# Patient Record
Sex: MALE | Race: WHITE | Employment: OTHER | URBAN - METROPOLITAN AREA
[De-identification: names, ages, dates, MRNs, and addresses within clinical notes are randomized per-mention and may not be internally consistent; named-entity substitution may affect disease eponyms.]

---

## 2018-08-08 ENCOUNTER — APPOINTMENT (OUTPATIENT)
Dept: GENERAL RADIOLOGY | Age: 63
DRG: 176 | End: 2018-08-08
Attending: EMERGENCY MEDICINE
Payer: SELF-PAY

## 2018-08-08 ENCOUNTER — APPOINTMENT (OUTPATIENT)
Dept: CT IMAGING | Age: 63
DRG: 176 | End: 2018-08-08
Attending: EMERGENCY MEDICINE
Payer: SELF-PAY

## 2018-08-08 ENCOUNTER — HOSPITAL ENCOUNTER (INPATIENT)
Age: 63
LOS: 1 days | Discharge: HOME | DRG: 176 | End: 2018-08-09
Attending: EMERGENCY MEDICINE | Admitting: INTERNAL MEDICINE
Payer: SELF-PAY

## 2018-08-08 DIAGNOSIS — I26.99 BILATERAL PULMONARY EMBOLISM (HCC): Primary | ICD-10-CM

## 2018-08-08 PROBLEM — R07.81 CHEST PAIN, PLEURITIC: Status: ACTIVE | Noted: 2018-08-08

## 2018-08-08 LAB
ALBUMIN SERPL-MCNC: 3.2 G/DL (ref 3.5–5)
ALBUMIN/GLOB SERPL: 0.8 {RATIO} (ref 1.1–2.2)
ALP SERPL-CCNC: 99 U/L (ref 45–117)
ALT SERPL-CCNC: 24 U/L (ref 12–78)
ANION GAP SERPL CALC-SCNC: 8 MMOL/L (ref 5–15)
AST SERPL-CCNC: 18 U/L (ref 15–37)
ATRIAL RATE: 81 BPM
BASOPHILS # BLD: 0 K/UL (ref 0–0.1)
BASOPHILS NFR BLD: 0 % (ref 0–1)
BILIRUB SERPL-MCNC: 1.2 MG/DL (ref 0.2–1)
BNP SERPL-MCNC: 146 PG/ML (ref 0–125)
BUN SERPL-MCNC: 9 MG/DL (ref 6–20)
BUN/CREAT SERPL: 11 (ref 12–20)
CALCIUM SERPL-MCNC: 8.7 MG/DL (ref 8.5–10.1)
CALCULATED P AXIS, ECG09: 3 DEGREES
CALCULATED R AXIS, ECG10: -59 DEGREES
CALCULATED T AXIS, ECG11: 2 DEGREES
CHLORIDE SERPL-SCNC: 99 MMOL/L (ref 97–108)
CO2 SERPL-SCNC: 28 MMOL/L (ref 21–32)
CREAT SERPL-MCNC: 0.8 MG/DL (ref 0.7–1.3)
DIAGNOSIS, 93000: NORMAL
DIFFERENTIAL METHOD BLD: ABNORMAL
EOSINOPHIL # BLD: 0.1 K/UL (ref 0–0.4)
EOSINOPHIL NFR BLD: 1 % (ref 0–7)
ERYTHROCYTE [DISTWIDTH] IN BLOOD BY AUTOMATED COUNT: 15.4 % (ref 11.5–14.5)
FERRITIN SERPL-MCNC: 443 NG/ML (ref 26–388)
GLOBULIN SER CALC-MCNC: 3.9 G/DL (ref 2–4)
GLUCOSE SERPL-MCNC: 123 MG/DL (ref 65–100)
HCT VFR BLD AUTO: 36.9 % (ref 36.6–50.3)
HGB BLD-MCNC: 12.6 G/DL (ref 12.1–17)
IMM GRANULOCYTES # BLD: 0.1 K/UL (ref 0–0.04)
IMM GRANULOCYTES NFR BLD AUTO: 1 % (ref 0–0.5)
IRON SATN MFR SERPL: 18 % (ref 20–50)
IRON SERPL-MCNC: 78 UG/DL (ref 35–150)
LYMPHOCYTES # BLD: 1.6 K/UL (ref 0.8–3.5)
LYMPHOCYTES NFR BLD: 14 % (ref 12–49)
MCH RBC QN AUTO: 36.4 PG (ref 26–34)
MCHC RBC AUTO-ENTMCNC: 34.1 G/DL (ref 30–36.5)
MCV RBC AUTO: 106.6 FL (ref 80–99)
MONOCYTES # BLD: 0.8 K/UL (ref 0–1)
MONOCYTES NFR BLD: 8 % (ref 5–13)
NEUTS SEG # BLD: 8.4 K/UL (ref 1.8–8)
NEUTS SEG NFR BLD: 76 % (ref 32–75)
NRBC # BLD: 0 K/UL (ref 0–0.01)
NRBC BLD-RTO: 0 PER 100 WBC
P-R INTERVAL, ECG05: 126 MS
PLATELET # BLD AUTO: 197 K/UL (ref 150–400)
PMV BLD AUTO: 9.1 FL (ref 8.9–12.9)
POTASSIUM SERPL-SCNC: 3.4 MMOL/L (ref 3.5–5.1)
PROT SERPL-MCNC: 7.1 G/DL (ref 6.4–8.2)
Q-T INTERVAL, ECG07: 444 MS
QRS DURATION, ECG06: 152 MS
QTC CALCULATION (BEZET), ECG08: 515 MS
RBC # BLD AUTO: 3.46 M/UL (ref 4.1–5.7)
SODIUM SERPL-SCNC: 135 MMOL/L (ref 136–145)
TIBC SERPL-MCNC: 441 UG/DL (ref 250–450)
TROPONIN I SERPL-MCNC: <0.05 NG/ML
VENTRICULAR RATE, ECG03: 81 BPM
WBC # BLD AUTO: 11 K/UL (ref 4.1–11.1)

## 2018-08-08 PROCEDURE — 96372 THER/PROPH/DIAG INJ SC/IM: CPT

## 2018-08-08 PROCEDURE — 74011250637 HC RX REV CODE- 250/637: Performed by: EMERGENCY MEDICINE

## 2018-08-08 PROCEDURE — 83880 ASSAY OF NATRIURETIC PEPTIDE: CPT | Performed by: EMERGENCY MEDICINE

## 2018-08-08 PROCEDURE — 82728 ASSAY OF FERRITIN: CPT | Performed by: INTERNAL MEDICINE

## 2018-08-08 PROCEDURE — 71045 X-RAY EXAM CHEST 1 VIEW: CPT

## 2018-08-08 PROCEDURE — 99285 EMERGENCY DEPT VISIT HI MDM: CPT

## 2018-08-08 PROCEDURE — 80053 COMPREHEN METABOLIC PANEL: CPT | Performed by: EMERGENCY MEDICINE

## 2018-08-08 PROCEDURE — 74011636320 HC RX REV CODE- 636/320: Performed by: RADIOLOGY

## 2018-08-08 PROCEDURE — 74011250636 HC RX REV CODE- 250/636: Performed by: INTERNAL MEDICINE

## 2018-08-08 PROCEDURE — 85025 COMPLETE CBC W/AUTO DIFF WBC: CPT | Performed by: EMERGENCY MEDICINE

## 2018-08-08 PROCEDURE — 84484 ASSAY OF TROPONIN QUANT: CPT | Performed by: EMERGENCY MEDICINE

## 2018-08-08 PROCEDURE — 74011250637 HC RX REV CODE- 250/637: Performed by: NURSE PRACTITIONER

## 2018-08-08 PROCEDURE — 71275 CT ANGIOGRAPHY CHEST: CPT

## 2018-08-08 PROCEDURE — 83540 ASSAY OF IRON: CPT | Performed by: INTERNAL MEDICINE

## 2018-08-08 PROCEDURE — 74011250636 HC RX REV CODE- 250/636: Performed by: EMERGENCY MEDICINE

## 2018-08-08 PROCEDURE — 65270000029 HC RM PRIVATE

## 2018-08-08 PROCEDURE — 36415 COLL VENOUS BLD VENIPUNCTURE: CPT | Performed by: EMERGENCY MEDICINE

## 2018-08-08 PROCEDURE — 93970 EXTREMITY STUDY: CPT

## 2018-08-08 PROCEDURE — 93306 TTE W/DOPPLER COMPLETE: CPT

## 2018-08-08 PROCEDURE — 65660000000 HC RM CCU STEPDOWN

## 2018-08-08 PROCEDURE — 93005 ELECTROCARDIOGRAM TRACING: CPT

## 2018-08-08 RX ORDER — IBUPROFEN 200 MG
200 TABLET ORAL
COMMUNITY
End: 2018-08-08

## 2018-08-08 RX ORDER — ACETAMINOPHEN 325 MG/1
650 TABLET ORAL
Status: DISCONTINUED | OUTPATIENT
Start: 2018-08-08 | End: 2018-08-09 | Stop reason: HOSPADM

## 2018-08-08 RX ORDER — SODIUM CHLORIDE 0.9 % (FLUSH) 0.9 %
5-10 SYRINGE (ML) INJECTION EVERY 8 HOURS
Status: DISCONTINUED | OUTPATIENT
Start: 2018-08-08 | End: 2018-08-09 | Stop reason: HOSPADM

## 2018-08-08 RX ORDER — DIPHENHYDRAMINE HCL 25 MG
25 CAPSULE ORAL
Status: DISCONTINUED | OUTPATIENT
Start: 2018-08-08 | End: 2018-08-09 | Stop reason: HOSPADM

## 2018-08-08 RX ORDER — HYDROCODONE BITARTRATE AND ACETAMINOPHEN 5; 325 MG/1; MG/1
1 TABLET ORAL
Status: COMPLETED | OUTPATIENT
Start: 2018-08-08 | End: 2018-08-08

## 2018-08-08 RX ORDER — ENOXAPARIN SODIUM 100 MG/ML
1 INJECTION SUBCUTANEOUS
Status: COMPLETED | OUTPATIENT
Start: 2018-08-08 | End: 2018-08-08

## 2018-08-08 RX ORDER — ENOXAPARIN SODIUM 100 MG/ML
1 INJECTION SUBCUTANEOUS EVERY 12 HOURS
Status: DISCONTINUED | OUTPATIENT
Start: 2018-08-09 | End: 2018-08-09 | Stop reason: HOSPADM

## 2018-08-08 RX ORDER — ONDANSETRON 2 MG/ML
4 INJECTION INTRAMUSCULAR; INTRAVENOUS
Status: DISCONTINUED | OUTPATIENT
Start: 2018-08-08 | End: 2018-08-09 | Stop reason: HOSPADM

## 2018-08-08 RX ORDER — HYDROCODONE BITARTRATE AND ACETAMINOPHEN 5; 325 MG/1; MG/1
1 TABLET ORAL
Status: DISCONTINUED | OUTPATIENT
Start: 2018-08-08 | End: 2018-08-09 | Stop reason: HOSPADM

## 2018-08-08 RX ORDER — NALOXONE HYDROCHLORIDE 0.4 MG/ML
0.4 INJECTION, SOLUTION INTRAMUSCULAR; INTRAVENOUS; SUBCUTANEOUS AS NEEDED
Status: DISCONTINUED | OUTPATIENT
Start: 2018-08-08 | End: 2018-08-09 | Stop reason: HOSPADM

## 2018-08-08 RX ORDER — POTASSIUM CHLORIDE 1.5 G/1.77G
40 POWDER, FOR SOLUTION ORAL ONCE
Status: COMPLETED | OUTPATIENT
Start: 2018-08-08 | End: 2018-08-08

## 2018-08-08 RX ORDER — NICOTINE 7MG/24HR
1 PATCH, TRANSDERMAL 24 HOURS TRANSDERMAL DAILY
Status: DISCONTINUED | OUTPATIENT
Start: 2018-08-09 | End: 2018-08-09 | Stop reason: HOSPADM

## 2018-08-08 RX ORDER — SODIUM CHLORIDE 0.9 % (FLUSH) 0.9 %
5-10 SYRINGE (ML) INJECTION AS NEEDED
Status: DISCONTINUED | OUTPATIENT
Start: 2018-08-08 | End: 2018-08-09 | Stop reason: HOSPADM

## 2018-08-08 RX ADMIN — Medication 10 ML: at 17:07

## 2018-08-08 RX ADMIN — ENOXAPARIN SODIUM 100 MG: 100 INJECTION SUBCUTANEOUS at 23:07

## 2018-08-08 RX ADMIN — IOPAMIDOL 100 ML: 755 INJECTION, SOLUTION INTRAVENOUS at 12:18

## 2018-08-08 RX ADMIN — ENOXAPARIN SODIUM 100 MG: 40 INJECTION, SOLUTION INTRAVENOUS; SUBCUTANEOUS at 12:34

## 2018-08-08 RX ADMIN — POTASSIUM CHLORIDE 40 MEQ: 1.5 POWDER, FOR SOLUTION ORAL at 14:28

## 2018-08-08 RX ADMIN — Medication 10 ML: at 21:45

## 2018-08-08 RX ADMIN — HYDROCODONE BITARTRATE AND ACETAMINOPHEN 1 TABLET: 5; 325 TABLET ORAL at 10:59

## 2018-08-08 NOTE — ED NOTES
Blood specimens collected from existing IV and sent to lab. Instructed to collect hemoccult specimen from next BM per Dr. Krys Pringle.

## 2018-08-08 NOTE — ED NOTES
TRANSFER - OUT REPORT:    Verbal report given to JAMES Winston on [facility]  being transferred to room [phone] for routine progression of care       Report consisted of patients Situation, Background, Assessment and   Recommendations(SBAR). Information from the following report(s) SBAR, Kardex, ED Summary, Procedure Summary, Intake/Output, MAR, Recent Results, Med Rec Status and Cardiac Rhythm NSR was reviewed with the receiving nurse. Lines:   Peripheral IV 08/08/18 Left Hand (Active)   Site Assessment Clean, dry, & intact 8/8/2018 10:00 AM   Phlebitis Assessment 0 8/8/2018 10:00 AM   Infiltration Assessment 0 8/8/2018 10:00 AM   Dressing Status Clean, dry, & intact 8/8/2018 10:00 AM   Dressing Type Transparent 8/8/2018 10:00 AM   Hub Color/Line Status Pink 8/8/2018 10:00 AM        Opportunity for questions and clarification was provided.       Patient transported with:   Monitor  Registered Nurse

## 2018-08-08 NOTE — PROGRESS NOTES
Advance Care Planning Note    Name: Anabela Harris  YOB: 1955  MRN: [id number]  Admission Date: 8/8/2018  9:37 AM    Date of discussion: 8/8/2018    Active Diagnoses:    Hospital Problems  Date Reviewed: 8/8/2018          Codes Class Noted POA   * (Principal)Pulmonary emboli (HCC)   Tobacco abuse   Alcohol use   Chest pain, pleuritic   Idiopathic gout of multiple sites           These active diagnoses are of sufficient risk that focused discussion on advance care planning is indicated in order to allow the patient to thoughtfully consider personal goals of care, and if situations arise that prevent the ability to personally give input, to ensure appropriate representation of their personal desires for different levels and aggressiveness of care. Discussion:     Persons present and participating in discussion: Marvin King MD, Patient's wife    Discussion: Advised of diagnosis, prognosis, treatment and risks of pulmonary embolism. Wife is surrogate decision maker. Full code status. Time Spent:     Total time spent face-to-face in education and discussion: 20 minutes.      Marvin Amin MD  8/8/2018  1:18 PM

## 2018-08-08 NOTE — ROUTINE PROCESS
Primary Nurse Cookie Lu and Zaria Shelton, RN performed a dual skin assessment on this patient No impairment noted  Capo score is 21

## 2018-08-08 NOTE — CONSULTS
Name: [facility]: [address]   : 1955 Admit Date: 2018   Phone: [phone]  Room: [facility]/   PCP: Christy Arce MD  MRN: [id number]   Date: 2018  Code: Prior        HPI:      Chart and notes reviewed. Data reviewed. I review the patient's current medications in the medical record at each encounter.  I have evaluated and examined the patient. 1:31 PM       History was obtained from patient. I was asked by Lian Gonzalez MD to see Noe Ryder in consultation for a chief complaint of bilateral PE, abnormal chest CT. History of Present Illness:   is a very pleasant, 66year old gentleman that presented to [facility] today with worsening chest pain and shortness of breath. He reports that he started to have symptoms yesterday. He has a hard time breathing unless he is sitting up. He also coughed up small amount of blood last night, this has not occurred since that time. Denies fever/chills. Denies LE pain or swelling, although notes that he will occasionally swell in his right leg due to gout. He denies recent travel: he drives daily about 20 minutes but otherwise has not had any trips or airplane travel. Denies immobility or recent surgeries. He works as a  and is constantly active. Denies family history of blood clots. Denies history of cancer. Reports that he never sees a doctor and has not had any screenings for his age. He smokes daily up to 1/2 ppd. Denies history of lung disease. Images:  Personally reviewed. Chest CTA:  Large cast-like emboli in both lower lobes, extending ot the left main pulmonary artery. Proagation is noted distally in both lower lobes. Associated triangular peripheral airspace disease and small pleural effusion on the left. No mediastinal or hilar adenopathy. No evidence of aortic dissection or aneurysm.     WBC 11  Hgb 12.6  Plts 197  K 3.4  Trop <.05  Pro-      Past Medical History:   Diagnosis Date    Alcohol use     Idiopathic gout of multiple sites 6/7/2016    Kidney stone 10/16/2015    Tobacco abuse        History reviewed. No pertinent surgical history. Family History   Problem Relation Age of Onset    Dementia Mother     Diabetes Mother     Heart Failure Father        Social History   Substance Use Topics    Smoking status: Current Every Day Smoker     Packs/day: 0.75     Years: 40.00     Types: Cigarettes    Smokeless tobacco: Never Used    Alcohol use 0.0 oz/week     0 Standard drinks or equivalent per week      Comment: 1 bottle of scotch weekly. Allergies   Allergen Reactions    Eggplant Anaphylaxis    Pcn [Penicillins] Anaphylaxis and Swelling    Codeine Rash, Nausea and Vomiting and Swelling       Current Facility-Administered Medications   Medication Dose Route Frequency    [START ON 8/9/2018] enoxaparin (LOVENOX) injection 100 mg  1 mg/kg SubCUTAneous Q12H    [START ON 8/9/2018] nicotine (NICODERM CQ) 7 mg/24 hr patch 1 Patch  1 Patch TransDERmal DAILY     No current outpatient prescriptions on file. REVIEW OF SYSTEMS   Negative except as stated in the HPI. Physical Exam:   Visit Vitals    BP (!) 153/92    Pulse 73    Temp 98.7 °F (37.1 °C)    Resp 21    Ht 5' 11\" (1.803 m)    Wt 99.8 kg (220 lb)    SpO2 95%    BMI 30.68 kg/m2       General:  Alert, cooperative, no distress, appears stated age. Head:  Normocephalic, without obvious abnormality, atraumatic. Eyes:  Conjunctivae/corneas clear. Nose: Nares normal. Septum midline. Mucosa normal.    Throat: Lips, mucosa, and tongue normal. Teeth and gums normal.   Neck: Supple, symmetrical, trachea midline, no adenopathy. Lungs:   Few crackles in left base, otherwise clear. Chest wall:  No tenderness or deformity. Heart:  Regular rate and rhythm, S1, S2 normal, no murmur, click, rub or gallop. Abdomen:   Soft, non-tender.  Bowel sounds normal.    Extremities: Extremities normal, atraumatic, no cyanosis or edema. Pulses: 2+ and symmetric all extremities. Skin: Skin color, texture, turgor normal. No rashes or lesions   Lymph nodes: Cervical nodes normal.   Neurologic: Grossly nonfocal       Lab Results   Component Value Date/Time    Sodium 135 (L) 08/08/2018 10:02 AM    Potassium 3.4 (L) 08/08/2018 10:02 AM    Chloride 99 08/08/2018 10:02 AM    CO2 28 08/08/2018 10:02 AM    BUN 9 08/08/2018 10:02 AM    Creatinine 0.80 08/08/2018 10:02 AM    Glucose 123 (H) 08/08/2018 10:02 AM    Calcium 8.7 08/08/2018 10:02 AM       Lab Results   Component Value Date/Time    WBC 11.0 08/08/2018 10:02 AM    HGB 12.6 08/08/2018 10:02 AM    PLATELET 963 43/60/3379 10:02 AM    .6 (H) 08/08/2018 10:02 AM       Lab Results   Component Value Date/Time    AST (SGOT) 18 08/08/2018 10:02 AM    Alk.  phosphatase 99 08/08/2018 10:02 AM    Protein, total 7.1 08/08/2018 10:02 AM    Albumin 3.2 (L) 08/08/2018 10:02 AM    Globulin 3.9 08/08/2018 10:02 AM       No results found for: IRON, FE, TIBC, IBCT, PSAT, FERR    Lab Results   Component Value Date/Time    C-Reactive protein 4.93 (H) 10/08/2015 10:00 PM        No results found for: PH, PHI, PCO2, PCO2I, PO2, PO2I, HCO3, HCO3I, FIO2, FIO2I    Lab Results   Component Value Date/Time    Troponin-I, Qt. <0.05 08/08/2018 10:02 AM        Lab Results   Component Value Date/Time    Culture result: NO GROWTH 5 DAYS 10/09/2015 12:41 AM       No results found for: TOXA1, RPR, HBCM, HBSAG, HAAB, HCAB1, HAAT, G6PD, CRYAC, HIVGT, HIVR, HIV1, HIV12, HIVPC, HIVRPI    No results found for: VANCT, CPK    Lab Results   Component Value Date/Time    Color YELLOW/STRAW 08/27/2015 05:27 PM    Appearance CLEAR 08/27/2015 05:27 PM    pH (UA) 7.0 08/27/2015 05:27 PM    Protein NEGATIVE  08/27/2015 05:27 PM    Glucose NEGATIVE  08/27/2015 05:27 PM    Ketone 40 (A) 08/27/2015 05:27 PM    Bilirubin NEGATIVE  08/27/2015 05:27 PM    Blood MODERATE (A) 08/27/2015 05:27 PM Urobilinogen 0.2 08/27/2015 05:27 PM    Nitrites NEGATIVE  08/27/2015 05:27 PM    Leukocyte Esterase NEGATIVE  08/27/2015 05:27 PM    WBC 0-4 08/27/2015 05:27 PM    RBC 0-5 08/27/2015 05:27 PM    Bacteria NEGATIVE  08/27/2015 05:27 PM       IMPRESSION  · Dyspnea  · Abnormal Chest CT: with multiple bilateral pulmonary emboli with likely left lower lobe infarcttion and small pleural effusion: PEs appear to be unprovoked  · Smoker  · History of Gout  · Hypokalemia    PLAN  · Supplemental O2 if needed to keep sats >90%. Currently maintaining well on RA. · Agree with Lovenox: will need to bridge to oral therapy for 3-6 months  · Check ECHO  · BLE Dopplers  · Consider checking hypercoagulable panel as this appears to be unprovoked  · Needs outpatient PCP follow-up for screening and referral for colonoscopy  · Replete K  · Advised that he needs to cut back on smoking: Nicotine patch already ordered    Thank you for allowing us to participate in 's care.       Rhonda South NP

## 2018-08-08 NOTE — PROCEDURES
[facility]  *** FINAL REPORT ***    Name: Bushra Bosch  MRN: [id number]    Inpatient  : 05 Aug 1955  HIS Order #: [id number]  [address] [facility] Visit #: [id number]  Date: 08 Aug 2018    TYPE OF TEST: Peripheral Venous Testing    REASON FOR TEST  Suspected pulmonary embolism    Right Leg:-  Deep venous thrombosis:           Yes  Proximal extent of thrombus:      Common Femoral  Superficial venous thrombosis:    Yes  Deep venous insufficiency:        No  Superficial venous insufficiency: No    Left Leg:-  Deep venous thrombosis:           No  Superficial venous thrombosis:    No  Deep venous insufficiency:        No  Superficial venous insufficiency: No      INTERPRETATION/FINDINGS  PROCEDURE:  BILATERAL LE VENOUS DUPLEX. Evaluation of lower extremity veins with ultrasound (B-mode imaging,  pulsed Doppler, color Doppler). Includes the common femoral, deep  femoral, femoral, popliteal, posterior tibial, peroneal, and great  saphenous veins. FINDINGS:  Curly Cower scale and color flow duplex images of the proximal  common femoral vein and saphenpfemoral junction in the right lower  extremity demonstrate no/partial compressibility, with filling  defects. Thrombus appears acute. CONCLUSION: Right lower extremity venous duplex positive for acute  deep venous thrombosis involving the proximal common femoral vein and  saphenofemoral junction. Left lower extremity is thrombus free. ADDITIONAL COMMENTS    I have personally reviewed the data relevant to the interpretation of  this  study. TECHNOLOGIST: Ines Lazar. Mateo  Signed: 2018 04:44 PM    PHYSICIAN: Karine Beckford.  Asa Guevara MD  Signed: 2018 11:18 AM

## 2018-08-08 NOTE — H&P
[facility]    Hospitalist Admission Note      NAME:  Silvia Vishnu   :   1955   MRN:  [id number]     PCP:  Lynsey Peralta MD     Date/Time:  2018 1:11 PM          Subjective:     CHIEF COMPLAINT: chest pain    HISTORY OF PRESENT ILLNESS:     Mr. Keturah Phillip is a 66 y.o.  male who presented to the Emergency Department complaining of chest pain. It started yesterday. Left sie, pleuritic, positional, radiates to neck, sharp, associated with dyspnea, and trace blood in thin sputum. New. ER workup shows bilateral PE on CTA chest.  He denies travel, immobility, trauma, personal or family hx of clots. We will admit him for management. Past Medical History:   Diagnosis Date    Alcohol use     Idiopathic gout of multiple sites 2016    Kidney stone 10/16/2015    Tobacco abuse         History reviewed. No pertinent surgical history. Social History   Substance Use Topics    Smoking status: Current Every Day Smoker     Packs/day: 0.75     Years: 40.00     Types: Cigarettes    Smokeless tobacco: Never Used    Alcohol use 0.0 oz/week     0 Standard drinks or equivalent per week      Comment: 1 bottle of scotch weekly.         Family History   Problem Relation Age of Onset    Dementia Mother     Diabetes Mother     Heart Failure Father       Allergies   Allergen Reactions    Pcn [Penicillins] Anaphylaxis    Codeine Swelling    Codeine Rash    Pcn [Penicillins] Swelling        Prior to Admission medications    Not on File       Review of Systems:  (bold if positive, if negative)    Gen:  fatigueEyes:  ENT:  CVS:   chest painPulm:  Cough, dyspnea, sputum, hemoptysis,GI:    :    MS:  Skin:  Psych:  Endo:    Hem:  Renal:    Neuro:        Objective:      VITALS:    Vital signs reviewed; most recent are:    Visit Vitals    BP (!) 153/92    Pulse 73    Temp 98.7 °F (37.1 °C)    Resp 21    Ht 5' 11\" (1.803 m)    Wt 99.8 kg (220 lb)    SpO2 95%    BMI 30.68 kg/m2 SpO2 Readings from Last 6 Encounters:   08/08/18 95%   10/10/15 98%   08/27/15 97%        No intake or output data in the 24 hours ending 08/08/18 1311     Exam:     Physical Exam:    Gen:  Well-developed, well-nourished, in no acute distress  HEENT:  Pink conjunctivae, PERRL, hearing intact to voice, moist mucous membranes  Neck:  Supple, without masses, thyroid non-tender  Resp:  No accessory muscle use, reduced breath sounds without wheezes rales or rhonchi  Card:  No murmurs, normal S1, S2 without thrills, bruits or peripheral edema  Abd:  Soft, non-tender, non-distended, normoactive bowel sounds are present, no mass  Lymph:  No cervical or inguinal adenopathy  Musc:  No cyanosis or clubbing  Skin:  No rashes or ulcers, skin turgor is good  Neuro:  Cranial nerves are grossly intact, no focal motor weakness, follows commands appropriately  Psych:  Good insight, oriented to person, place and time, alert     Labs:    Recent Labs      08/08/18   1002   WBC  11.0   HGB  12.6   HCT  36.9   PLT  197     Recent Labs      08/08/18   1002   NA  135*   K  3.4*   CL  99   CO2  28   GLU  123*   BUN  9   CREA  0.80   CA  8.7   ALB  3.2*   TBILI  1.2*   SGOT  18   ALT  24     No results found for: GLUCPOC  No results for input(s): PH, PCO2, PO2, HCO3, FIO2 in the last 72 hours. No results for input(s): INR in the last 72 hours. No lab exists for component: INREXT  All Micro Results     Procedure Component Value Units Date/Time    CULTURE, BLOOD, PAIRED [[id number]]     Order Status:  Canceled Specimen:  Blood           I have reviewed previous records       Assessment and Plan:      Pulmonary emboli / Chest pain, pleuritic - POA, new and unclear trigger. No clear trigger. I assume spontaneous. Thus would be on anticoagulation indefinitely. PCP to manage. Patient needs routine cancer screening by PCP, never has colonoscopy. During this admit, monitor, tele, check ECHO, check LE dopplers for further burden.  Check 6min walk tomorrow. Pulmonary consult to evaluate abnormal CTA. Most likely infarction, less likely pneumonia in the absence of any SIRS criteria. Idiopathic gout of multiple sites - Currently no symptoms. Takes prn motrin, and I advised to use tylenol while on blood thinners. Tobacco abuse - Advised cessation. Spent 5 minutes in addition to all other time spent on admission, noted below. Provide patch. Alcohol use - Unlikely any risk of withdrawal, but risk of gastritis. Advised cessation. Check hemoccult. Check hepatitis panel.     Telemetry reviewed:   normal sinus rhythm    Risk of deterioration: high      Total time spent with patient: [address] discussed with: Patient, Family, Nursing Staff, Consultant/Specialist and >50% of time spent in counseling and coordination of care    Discussed:  Care Plan       ___________________________________________________    Attending Physician: Skylar Denise MD

## 2018-08-08 NOTE — ROUTINE PROCESS
Bedside shift change report given to Maurisio (oncoming nurse) by Janet Matamoros (offgoing nurse). Report included the following information SBAR and ED Summary.

## 2018-08-08 NOTE — ED PROVIDER NOTES
HPI Comments: 66 y.o. male with past medical history significant for gout and kidney stones who presents via private vehicle from home accompanied by his wife with chief complaint of chest pain. Patient reports 1 day Hx (8/7/18) of left-sided chest pain exacerbated when supine, with inspiration, and sometimes radiating to his neck with coughing. Patient describes pain as \"a stitch in (his) side, like when you're running. \" Patient reports cough with blood-tinged streaks this morning (8/8/18). Patient denies Hx PE. Patient's last visit was approx. 3 years ago (10/8/15) for cellulitis. Patient denies recent travel, intentional weight loss, fever, abdominal pain, and leg swelling. Patient is allergic to codeine and penicillin. There are no other acute medical concerns at this time. Social hx: Current every day tobacco smoker; Denies EtOH use; Denies illicit drug use  PCP: Denver Grad, MD    Note written by Gilberto Mullins, as dictated by Dwayne Thornton MD 9:46 AM    The history is provided by the patient. No  was used. Past Medical History:   Diagnosis Date    Idiopathic gout of multiple sites 6/7/2016    Kidney stone 10/16/2015       No past surgical history on file. No family history on file. Social History     Social History    Marital status:      Spouse name: N/A    Number of children: N/A    Years of education: N/A     Occupational History    Not on file. Social History Main Topics    Smoking status: Current Every Day Smoker    Smokeless tobacco: Not on file    Alcohol use 0.0 oz/week     0 Standard drinks or equivalent per week    Drug use: No    Sexual activity: Not on file     Other Topics Concern    Not on file     Social History Narrative    ** Merged History Encounter **              ALLERGIES: Pcn [penicillins]; Codeine;  Codeine; and Pcn [penicillins]    Review of Systems   Constitutional: Negative for fever and unexpected weight change. Respiratory: Positive for cough (blood-tinged streaks). Cardiovascular: Positive for chest pain (left-sided). Negative for leg swelling. Gastrointestinal: Negative for abdominal pain. All other systems reviewed and are negative. Vitals:    08/08/18 0935   BP: 143/84   Pulse: 80   Resp: 24   Temp: 98.7 °F (37.1 °C)   SpO2: 95%   Weight: 99.8 kg (220 lb)   Height: 5' 11\" (1.803 m)            Physical Exam   Constitutional: He is oriented to person, place, and time. He appears well-developed and well-nourished. No distress. HENT:   Head: Normocephalic and atraumatic. Eyes: Conjunctivae are normal.   Neck: Normal range of motion. Cardiovascular: Normal rate, regular rhythm, normal heart sounds and intact distal pulses. Exam reveals no friction rub. No murmur heard. Pulmonary/Chest: Effort normal and breath sounds normal. No respiratory distress. He has no wheezes. He has no rales. He exhibits no tenderness. Abdominal: Soft. Bowel sounds are normal. He exhibits no distension. There is no tenderness. There is no rebound and no guarding. Musculoskeletal: Normal range of motion. He exhibits no edema or tenderness. Neurological: He is alert and oriented to person, place, and time. Coordination normal.   Skin: Skin is warm and dry. He is not diaphoretic. No pallor. Psychiatric: He has a normal mood and affect. His behavior is normal.   Nursing note and vitals reviewed.        MDM  Number of Diagnoses or Management Options  Bilateral pulmonary embolism [facility]):   Diagnosis management comments: PTHX vs PE vs PNA vs lung mass  Pain sounds more pleuritic than cardiac but with check troponin  Pt declined pain medication on arrival       Amount and/or Complexity of Data Reviewed  Clinical lab tests: ordered  Tests in the radiology section of CPT®: ordered and reviewed  Obtain history from someone other than the patient: yes (family)  Discuss the patient with other providers: yes (hospitalist)  Independent visualization of images, tracings, or specimens: yes (ekg)    Critical Care  Total time providing critical care: 30-74 minutes (Total critical care time spent exclusive of procedures:  40)    Patient Progress  Patient progress: stable        ED Course       Procedures    ED EKG interpretation:  Rhythm: RBBB; and regular . Rate (approx.): 80; Axis: left axis deviation; P wave: normal; QRS interval: prolonged; ST/T wave: non-specific changes; no old ekg for comparison  EKG documented by Nohemi Che MD, scribe, as interpreted by Nohemi Che MD, ED MD.    12:23 PM  Went to radiology to discuss patient's CT - no one in room. Called [facility] and spoke to Dr. Jaz Blancas. Patient appears to have bilateral PE's. 12:30 PM  Dr. Delores Zee will admit patient. Spoke with pt and family and discussed results. Ct favors pulm infarct over PNA and pt with no white count or fever. lovenox at this point and admit.  Will need hypercoagulable work up

## 2018-08-08 NOTE — PROGRESS NOTES
8/8/2018  3:33 PM    CM met w/ pt to begin d/c planning, charted demographics verified, lives w/ wife Abraham Pan [phone] in 1 story home w/ 2 entry steps, PTA independent, ambulatory w/o assistive device and drives. PCP Gabe Stephens MD, pt has no insurance, Rx prefers [facility] [address] [facility] [address]. DME:None, no prior HH. Reason for Admission:   SOB/Chest Pain                   RRAT Score:          4           Plan for utilizing home health: [facility]                    Likelihood of Readmission:  Low/Green                         Transition of Care Plan:       Hospital admission for medical management, d/c to home when stable, f/u w/ PCP and specialists. Care Management Interventions  PCP Verified by CM: Yes Lisa Hernandez MD; last visit 1 year ago)  Palliative Care Criteria Met (RRAT>21 & CHF Dx)?: No  Mode of Transport at Discharge: Self (wife will tranpsort at d/c)  Current Support Network: Lives with Spouse (pt lives w/ wife Abraham Pan [phone] in 1 story home)  Confirm Follow Up Transport: Self  Discharge Location  Discharge Placement: Home    CM placed TC to pt's wife Abraham Pan to verify pt is not insured,  Care Card application and Good Rx card given to pt. Wife will transport at d/c.   CM will follow and assist.        Adrián Juarez

## 2018-08-08 NOTE — ED TRIAGE NOTES
Pt presents ambulatory to ED with cc of left sided chest pain since yesterday afternoon. Pt reports the pain is a constant, dull, pressure, that is worse with laying down and breathing.

## 2018-08-08 NOTE — PROGRESS NOTES
[facility]: MED RECONCILIATION    Comments/Recommendations:   Interview conducted with patient and patient verifies allergies to PCN and Codeine. Also, allergic to eggplant. Patient usually does not take any medications, but did take one dose of Advil 200mg yesterday (8/7/18) and took one tablet of prednisone last month for cellulitis/Gout. Medications added:     · None    Medications removed:    · Doxycycline  · Ibuprofen  · L. Acidoph & paracasei- S therm- Bifido  · Oxycodone-acetaminophen  · Prednisone     Medications adjusted:    · None      Allergies: Pcn [penicillins]; Codeine;  Codeine; and Pcn [penicillins]    Prior to Admission Medications:     None            Thank you,    Jolene Richard, Pharmacy Intern  Reviewed and approved    Audrey Frye, PharmD, BCPS

## 2018-08-08 NOTE — IP AVS SNAPSHOT
[address] [facility] ([place] [facility] 
[phone] Patient: Joyce Thakkar MRN: [id number] About your hospitalization You were admitted on:  August 8, 2018 You last received care in the:  [facility]  MED SURG 2 You were discharged on:  August 9, 2018 Why you were hospitalized Your primary diagnosis was:  Pulmonary Emboli (Hcc) Your diagnoses also included:  Chest Pain, Pleuritic, Tobacco Abuse, Idiopathic Gout Of Multiple Sites, Alcohol Use Follow-up Information Follow up With Details Comments Contact Info Liu Guadalupe MD On 8/13/2018 Hospital PCP f/u appointment on Monday August 13 @ 11:30 a.m. [id number] [place] [address] 
[phone] Teofilo Lugo MD In 4 weeks repeat chest CT 3[address] [facility] [address] [place] [address] 
[phone] Your Scheduled Appointments Monday August 13, 2018 11:30 AM EDT  
ESTABLISHED PATIENT with MD Cat Mccauley TURNER, [address] [place] [address] ([facility]) [id number] [place] [address]  
[phone] Discharge Orders None A check jhonatan indicates which time of day the medication should be taken. My Medications START taking these medications Instructions Each Dose to Equal  
 Morning Noon Evening Bedtime  
 apixaban 5 mg (74 tabs) starter pack Commonly known as:  Corina Minus Take 10 mg (two 5 mg tablets) by mouth twice a day for 7 days  Followed by 5 mg (one 5 mg tablet) by mouth twice a day  
     
   
   
   
  
 cyanocobalamin 1,000 mcg tablet Start taking on:  8/10/2018 Notes to Patient:  Begin Friday morning. Take 1 Tab by mouth daily. 1000 mcg  
    
   
   
   
  
 folic acid 1 mg tablet Commonly known as:  Google Start taking on:  8/10/2018 Notes to Patient:  Begin Friday morning. Take 1 Tab by mouth daily. 1 mg Where to Get Your Medications Information on where to get these meds will be given to you by the nurse or doctor. ! Ask your nurse or doctor about these medications  
  apixaban 5 mg (74 tabs) starter pack  
 cyanocobalamin 1,000 mcg tablet  
 folic acid 1 mg tablet Discharge Instructions HOSPITALIST DISCHARGE INSTRUCTIONS 
NAME: Cecy Haynes :  1955 MRN:  [id number] Date/Time:  2018 9:03 AM 
 
ADMIT DATE: 2018 DISCHARGE DATE: 2018 PRINCIPAL DISCHARGE DIAGNOSES: 
Pulmonary embolism with right leg deep vein thrombosis MEDICATIONS: 
· It is important that you take the medication exactly as they are prescribed. Note the changes and additions to your medications. Be sure you understand these changes before you are discharged today. · Keep your medication in the bottles provided by the pharmacist and keep a list of the medication names, dosages, and times to be taken in your wallet. · Do not take other medications without consulting your doctor. Pain Management: per above medications What to do at [facility] Recommended diet:  Heart healthy diet Recommended activity: Activity as tolerated If you experience any of the following symptoms then please call your primary care physician or return to the emergency room if you cannot get hold of your doctor: 
Fever, chills, severe chest pain, shortness of breath, dizziness, or other severe concerning symptoms that brought you to the hospital in the first place Follow Up: Follow-up Information Follow up With Details Comments Contact Info Shadi Cohen MD On 2018 Hospital PCP f/u appointment on  @ 11:30 a.m. [address] [place] [address] 
[phone] Lisa Campuzano MD In 4 weeks repeat chest CT [address] [facility] [address] [place] [address] 
[phone] Information obtained by : 
 I understand that if any problems occur once I am at home I am to contact my physician. I understand and acknowledge receipt of the instructions indicated above. Physician's or R.N.'s Signature                                                                  Date/Time Patient or Representative Signature                                                          Date/Time [employer] Announcement We are excited to announce that we are making your provider's discharge notes available to you in [employer]. You will see these notes when they are completed and signed by the physician that discharged you from your recent hospital stay. If you have any questions or concerns about any information you see in [employer], please call the Health Information Department where you were seen or reach out to your Primary Care Provider for more information about your plan of care. Introducing [facility]! Riana Alston introduces [employer] patient portal. Now you can access parts of your medical record, email your doctor's office, and request medication refills online. 1. In your internet browser, go to [email or web address]. [employer] 2. Click on the First Time User? Click Here link in the Sign In box. You will see the New Member Sign Up page. 3. Enter your [employer] Access Code exactly as it appears below. You will not need to use this code after youve completed the sign-up process. If you do not sign up before the expiration date, you must request a new code. · [employer] Access Code: [email or web address] Expires: 11/6/2018  9:35 AM 
 
4.  Enter the last four digits of your Social Security Number (xxxx) and Date of Birth (mm/dd/yyyy) as indicated and click Submit. You will be taken to the next sign-up page. 5. Create a [employer] ID. This will be your [employer] login ID and cannot be changed, so think of one that is secure and easy to remember. 6. Create a [employer] password. You can change your password at any time. 7. Enter your Password Reset Question and Answer. This can be used at a later time if you forget your password. 8. Enter your e-mail address. You will receive e-mail notification when new information is available in [address] 9. Click Sign Up. You can now view and download portions of your medical record. 10. Click the Download Summary menu link to download a portable copy of your medical information. If you have questions, please visit the Frequently Asked Questions section of the [employer] website. Remember, [employer] is NOT to be used for urgent needs. For medical emergencies, dial 911. Now available from your iPhone and Android! Introducing Zack Blake As a [facility] patient, I wanted to make you aware of our electronic visit tool called Zack Blake. [facility] 24/7 allows you to connect within minutes with a medical provider 24 hours a day, seven days a week via a mobile device or tablet or logging into a secure website from your computer. You can access Zack Blake from anywhere in the [place]. A virtual visit might be right for you when you have a simple condition and feel like you just dont want to get out of bed, or cant get away from work for an appointment, when your regular [facility] provider is not available (evenings, weekends or holidays), or when youre out of town and need minor care. Electronic visits cost only $49 and if the [facility] 24/7 provider determines a prescription is needed to treat your condition, one can be electronically transmitted to a nearby pharmacy*. Please take a moment to enroll today if you have not already done so. The enrollment process is free and takes just a few minutes. To enroll, please download the [employer] 24/7 aneta to your tablet or phone, or visit [email or web address] to enroll on your computer. And, as an [address] [place] [address] patient with a [employer] account, the results of your visits will be scanned into your electronic medical record and your primary care provider will be able to view the scanned results. We urge you to continue to see your regular [employer] provider for your ongoing medical care. And while your primary care provider may not be the one available when you seek a [employer] virtual visit, the peace of mind you get from getting a real diagnosis real time can be priceless. For more information on [employer], view our Frequently Asked Questions (FAQs) at [email or web address]. Sincerely, 
 
Jeffry Donohue MD 
Chief Medical Officer [facility] Kiersten Kiran *:  certain medications cannot be prescribed via [employer] Unresulted Labs-Please follow up with your PCP about these lab tests Order Current Status HOMOCYSTEINE, PLASMA In process METHYLMALONIC ACID In process Providers Seen During Your Hospitalization Provider Specialty Primary office phone Gregoria Ramirez MD Emergency Medicine [phone] Kayode Bowers MD Internal Medicine [phone] Aura Prasad MD Internal Medicine [phone] Your Primary Care Physician (PCP) Primary Care Physician Office Phone Office Fax [employer] [phone] You are allergic to the following Allergen Reactions Eggplant Anaphylaxis Pcn (Penicillins) Anaphylaxis Swelling Codeine Rash Nausea and Vomiting Swelling Recent Documentation Height Weight BMI Smoking Status 1.803 m 99.8 kg 30.68 kg/m2 Current Every Day Smoker Emergency Contacts Name Discharge Info Relation Home Work Mobile Elaina Garcia DISCHARGE CAREGIVER [3] Spouse [3] [phone] Patient Belongings The following personal items are in your possession at time of discharge: 
     Visual Aid: None      Home Medications: None   Jewelry: None  Clothing: At bedside    Other Valuables: At bedside Please provide this summary of care documentation to your next provider. Signatures-by signing, you are acknowledging that this After Visit Summary has been reviewed with you and you have received a copy. Patient Signature:  ____________________________________________________________ Date:  ____________________________________________________________  
  
[facility] Provider Signature:  ____________________________________________________________ Date:  ____________________________________________________________

## 2018-08-09 VITALS
TEMPERATURE: 98.9 F | SYSTOLIC BLOOD PRESSURE: 118 MMHG | OXYGEN SATURATION: 95 % | DIASTOLIC BLOOD PRESSURE: 84 MMHG | RESPIRATION RATE: 18 BRPM | BODY MASS INDEX: 30.8 KG/M2 | WEIGHT: 220 LBS | HEART RATE: 75 BPM | HEIGHT: 71 IN

## 2018-08-09 LAB
ALBUMIN SERPL-MCNC: 2.8 G/DL (ref 3.5–5)
ALBUMIN/GLOB SERPL: 0.7 {RATIO} (ref 1.1–2.2)
ALP SERPL-CCNC: 91 U/L (ref 45–117)
ALT SERPL-CCNC: 20 U/L (ref 12–78)
ANION GAP SERPL CALC-SCNC: 10 MMOL/L (ref 5–15)
AST SERPL-CCNC: 21 U/L (ref 15–37)
BILIRUB DIRECT SERPL-MCNC: 0.2 MG/DL (ref 0–0.2)
BILIRUB SERPL-MCNC: 0.9 MG/DL (ref 0.2–1)
BUN SERPL-MCNC: 10 MG/DL (ref 6–20)
BUN/CREAT SERPL: 13 (ref 12–20)
CALCIUM SERPL-MCNC: 8.6 MG/DL (ref 8.5–10.1)
CHLORIDE SERPL-SCNC: 99 MMOL/L (ref 97–108)
CO2 SERPL-SCNC: 28 MMOL/L (ref 21–32)
CREAT SERPL-MCNC: 0.76 MG/DL (ref 0.7–1.3)
ERYTHROCYTE [DISTWIDTH] IN BLOOD BY AUTOMATED COUNT: 15 % (ref 11.5–14.5)
FOLATE SERPL-MCNC: 4.4 NG/ML (ref 5–21)
GLOBULIN SER CALC-MCNC: 3.8 G/DL (ref 2–4)
GLUCOSE SERPL-MCNC: 100 MG/DL (ref 65–100)
HCT VFR BLD AUTO: 35.4 % (ref 36.6–50.3)
HCYS SERPL-SCNC: 19.5 UMOL/L (ref 3.7–13.9)
HGB BLD-MCNC: 11.5 G/DL (ref 12.1–17)
MAGNESIUM SERPL-MCNC: 1.9 MG/DL (ref 1.6–2.4)
MCH RBC QN AUTO: 35.3 PG (ref 26–34)
MCHC RBC AUTO-ENTMCNC: 32.5 G/DL (ref 30–36.5)
MCV RBC AUTO: 108.6 FL (ref 80–99)
NRBC # BLD: 0 K/UL (ref 0–0.01)
NRBC BLD-RTO: 0 PER 100 WBC
PLATELET # BLD AUTO: 197 K/UL (ref 150–400)
PMV BLD AUTO: 9.2 FL (ref 8.9–12.9)
POTASSIUM SERPL-SCNC: 3.4 MMOL/L (ref 3.5–5.1)
PROT SERPL-MCNC: 6.6 G/DL (ref 6.4–8.2)
RBC # BLD AUTO: 3.26 M/UL (ref 4.1–5.7)
SODIUM SERPL-SCNC: 137 MMOL/L (ref 136–145)
TSH SERPL DL<=0.05 MIU/L-ACNC: 1.46 UIU/ML (ref 0.36–3.74)
VIT B12 SERPL-MCNC: 299 PG/ML (ref 193–986)
WBC # BLD AUTO: 10.3 K/UL (ref 4.1–11.1)

## 2018-08-09 PROCEDURE — 83090 ASSAY OF HOMOCYSTEINE: CPT | Performed by: INTERNAL MEDICINE

## 2018-08-09 PROCEDURE — 82607 VITAMIN B-12: CPT | Performed by: INTERNAL MEDICINE

## 2018-08-09 PROCEDURE — 83735 ASSAY OF MAGNESIUM: CPT | Performed by: INTERNAL MEDICINE

## 2018-08-09 PROCEDURE — 82746 ASSAY OF FOLIC ACID SERUM: CPT | Performed by: INTERNAL MEDICINE

## 2018-08-09 PROCEDURE — 84443 ASSAY THYROID STIM HORMONE: CPT | Performed by: INTERNAL MEDICINE

## 2018-08-09 PROCEDURE — 74011250637 HC RX REV CODE- 250/637: Performed by: INTERNAL MEDICINE

## 2018-08-09 PROCEDURE — 94761 N-INVAS EAR/PLS OXIMETRY MLT: CPT

## 2018-08-09 PROCEDURE — 80076 HEPATIC FUNCTION PANEL: CPT | Performed by: INTERNAL MEDICINE

## 2018-08-09 PROCEDURE — 80048 BASIC METABOLIC PNL TOTAL CA: CPT | Performed by: INTERNAL MEDICINE

## 2018-08-09 PROCEDURE — 36415 COLL VENOUS BLD VENIPUNCTURE: CPT | Performed by: INTERNAL MEDICINE

## 2018-08-09 PROCEDURE — 85027 COMPLETE CBC AUTOMATED: CPT | Performed by: INTERNAL MEDICINE

## 2018-08-09 PROCEDURE — 83921 ORGANIC ACID SINGLE QUANT: CPT | Performed by: INTERNAL MEDICINE

## 2018-08-09 RX ORDER — POTASSIUM CHLORIDE 750 MG/1
40 TABLET, FILM COATED, EXTENDED RELEASE ORAL
Status: COMPLETED | OUTPATIENT
Start: 2018-08-09 | End: 2018-08-09

## 2018-08-09 RX ORDER — LANOLIN ALCOHOL/MO/W.PET/CERES
1000 CREAM (GRAM) TOPICAL DAILY
Qty: 30 TAB | Refills: 0 | Status: SHIPPED | OUTPATIENT
Start: 2018-08-10 | End: 2019-09-30

## 2018-08-09 RX ORDER — LANOLIN ALCOHOL/MO/W.PET/CERES
1000 CREAM (GRAM) TOPICAL DAILY
Status: DISCONTINUED | OUTPATIENT
Start: 2018-08-09 | End: 2018-08-09 | Stop reason: HOSPADM

## 2018-08-09 RX ORDER — LANOLIN ALCOHOL/MO/W.PET/CERES
1000 CREAM (GRAM) TOPICAL DAILY
Qty: 30 TAB | Refills: 0 | Status: SHIPPED | OUTPATIENT
Start: 2018-08-10 | End: 2018-08-09

## 2018-08-09 RX ORDER — FOLIC ACID 1 MG/1
1 TABLET ORAL DAILY
Qty: 30 TAB | Refills: 0 | Status: SHIPPED | OUTPATIENT
Start: 2018-08-10 | End: 2018-08-09

## 2018-08-09 RX ORDER — FOLIC ACID 1 MG/1
1 TABLET ORAL DAILY
Qty: 30 TAB | Refills: 0 | Status: SHIPPED | OUTPATIENT
Start: 2018-08-10 | End: 2019-09-30

## 2018-08-09 RX ORDER — FOLIC ACID 1 MG/1
1 TABLET ORAL DAILY
Status: DISCONTINUED | OUTPATIENT
Start: 2018-08-09 | End: 2018-08-09 | Stop reason: HOSPADM

## 2018-08-09 RX ADMIN — FOLIC ACID 1 MG: 1 TABLET ORAL at 09:25

## 2018-08-09 RX ADMIN — POTASSIUM CHLORIDE 40 MEQ: 750 TABLET, EXTENDED RELEASE ORAL at 09:25

## 2018-08-09 RX ADMIN — CYANOCOBALAMIN TAB 500 MCG 1000 MCG: 500 TAB at 09:25

## 2018-08-09 NOTE — PROGRESS NOTES
8/9/2018   CARE MANAGEMENT NOTE:  (cross coverage)  CM received consult re: Eliquis cost post discharge. CM met with pt and confirmed that he is employed but uninsured. His wife has a discount card with [employer] for yearly RXs. CM provided pt with an Eliquis coupon for a 30 day free trial.  Encouraged pt to ask the [facility]'s pharmacist for the out of pocket costs with his discount for monthly planning. He has the Care Card info for completion and assistance with future medical and hospitalization assistance.   Alisia

## 2018-08-09 NOTE — PROGRESS NOTES
Patient given discharge instructions and prescriptions. PIV removed. Patient verbalizes understanding of discharge instructions and follow up. Patient's wife unable to pick him up until 5pm, so patient will be going to [facility] to wait for ride. Patient showering and eating a snack, will then be transported by wheelchair to [facility] to await family and ride home.

## 2018-08-09 NOTE — PROGRESS NOTES
Oximetry 6 Minute Walk Test was performed in hallway. Patient tolerated well with no shortness of breath. His resting room air oxygen saturation was 94% with a heart rate of 72 bpm.  His lowest O2 saturation during the walk was 96%. Heart rate increased to 85 bpm.  No O2 was indicated. [facility]

## 2018-08-09 NOTE — DISCHARGE SUMMARY
Physician Discharge Summary     Patient ID:  Erasto Bravo  [id number]  48 y.o.  1955    Admit date: 8/8/2018    Discharge date: 8/9/2018    Admission Diagnoses: Pulmonary emboli [facility])    Principal Discharge Diagnoses:    Acute PE and DVT    OTHER PROBLEMS ADDRESSEDS  Principal Diagnosis Pulmonary emboli (HCC)                                            Principal Problem:    Pulmonary emboli ([address] (8/8/2018)    Active Problems:    Idiopathic gout of multiple sites (6/7/2016)      Tobacco abuse ()      Alcohol use ()      Chest pain, pleuritic (8/8/2018)       Patient Active Problem List   Diagnosis Code    Idiopathic gout of multiple sites M10.09    Pulmonary emboli (HCC) I26.99    Tobacco abuse Z72.0    Alcohol use Z78.9    Chest pain, pleuritic R07.81         Hospital Course:   Mr. Trena Toney is a 67 yo WM w/ hx of tobacco abuse who was admitted for PE. He was also found to have DVT on LE dopplers. There is no clear risk factor for VTE, other than smoking. He did well on Lovenox, requiring no supplemental O2 on 6 min walk. He improved quickly in his symptoms and was transitioned to Eliquis at discharge. He was seen by pulm and will repeat chest CT in 4 weeks. It was advised that he follow up with his PCP for refills of Eliquis, as he needs at a minimum of 3 months of treatment however likely longer. We provided him with a coupon for Eliquis. I advised that he cannot miss any doses. PCP should also do age-appropriate cancer screening and consider checking hypercoagulable workup. If positive, he will need treatment indefinitely. Will CC PCP on this note as well    For his tobacco abuse, I strongly recommended smoking cessation. Discussed possible pharmacotherapy including nicotine replacement therapy, Chantix/Wellbutrin. Provided number for 1-800-QUIT-NOW.  I spent ~4 minutes (outside of the time documented for this note) exclusively focused on tobacco cessation counseling    I checked his folate and B12 due to macrocytosis. He does have folate def, with borderline b12. I provided Rxs for both. I checked MMA and homocysteine prior to discharge, with the results pending    Pt discharged in improved and stable condition. Procedures performed: none    Imaging studies: CTA chest  Bilateral large pulmonary emboli with probable left lower lobe  pulmonary infarction. Associated small left pleural transudate. LE dopplers: Right lower extremity venous duplex positive for acute  deep venous thrombosis involving the proximal common femoral vein and  saphenofemoral junction. Left lower extremity is thrombus free    PCP: Shadi Cohen MD    Consults: Pulmonary/Intensive care    Discharge Exam:  Patient Vitals for the past 12 hrs:   Temp Pulse Resp BP SpO2   08/09/18 1228 98.9 °F (37.2 °C) 75 18 118/84 95 %   08/09/18 0935 97.9 °F (36.6 °C) 73 18 100/54 93 %     GEN: pleasant, NAD  CV: RRR  RESP: CTAB      Disposition: home    Patient Instructions:   Current Discharge Medication List      START taking these medications    Details   apixaban (ELIQUIS) 5 mg (74 tabs) starter pack Take 10 mg (two 5 mg tablets) by mouth twice a day for 7 days   Followed by 5 mg (one 5 mg tablet) by mouth twice a day  Qty: 1 Dose Pack, Refills: 0      cyanocobalamin 1,000 mcg tablet Take 1 Tab by mouth daily. Qty: 30 Tab, Refills: 0      folic acid (FOLVITE) 1 mg tablet Take 1 Tab by mouth daily. Qty: 30 Tab, Refills: 0             Activity: See discharge instructions  Diet: See discharge instructions  Wound Care: See discharge instructions    Follow-up Information     Follow up With Details Comments Larry Ward MD On 8/13/2018 Hospital PCP f/u appointment on Monday August 13 @ 11:30 a.m. [address] [place] [address] Sushilall Campuzano MD In 4 weeks repeat chest  [address]  [phone]            I spent >30 minutes on this discharge.     Signed:  Mal Saxena MD  8/9/2018  10:15 AM

## 2018-08-09 NOTE — PROGRESS NOTES
[facility] follow-up appointment on Monday August 13,2018 @ 11:30 a.m. with Edi Loges.    Added to AVS.  Leobardo Sanchez CM Specialist

## 2018-08-09 NOTE — DISCHARGE INSTRUCTIONS
HOSPITALIST DISCHARGE INSTRUCTIONS  NAME: John Guerrero   :  1955   MRN:  [id number]     Date/Time:  2018 9:03 AM    ADMIT DATE: 2018     DISCHARGE DATE: 2018     PRINCIPAL DISCHARGE DIAGNOSES:  Pulmonary embolism with right leg deep vein thrombosis     MEDICATIONS:  · It is important that you take the medication exactly as they are prescribed. Note the changes and additions to your medications. Be sure you understand these changes before you are discharged today. · Keep your medication in the bottles provided by the pharmacist and keep a list of the medication names, dosages, and times to be taken in your wallet. · Do not take other medications without consulting your doctor. Pain Management: per above medications    What to do at Home    Recommended diet:  Heart healthy diet    Recommended activity: Activity as tolerated    If you experience any of the following symptoms then please call your primary care physician or return to the emergency room if you cannot get hold of your doctor:  Fever, chills, severe chest pain, shortness of breath, dizziness, or other severe concerning symptoms that brought you to the hospital in the first place    Follow Up: Follow-up Information     Follow up With Details Comments Contact Shereen Riddle MD On 2018 Hospital PCP f/u appointment on  @ 11:30 a.m. [address] [place] [address]      Rodrick Sun MD In 4 [place] [address] [facility] [phone]              Information obtained by :  I understand that if any problems occur once I am at home I am to contact my physician. I understand and acknowledge receipt of the instructions indicated above.                                                                                                                                            Physician's or R.N.'s Signature Date/Time                                                                                                                                              Patient or Representative Signature                                                          Date/Time

## 2018-08-09 NOTE — PROGRESS NOTES
Pharmacist Discharge Medication Reconciliation    Discharge Provider:  Dr. Delia Acuna     Discharge Medications:      My Medications        START taking these medications         Instructions Each Dose to Equal   Morning Noon Evening Bedtime      apixaban 5 mg (74 tabs) starter pack   Commonly known as:  KELLY       Your last dose was: Your next dose is: Take 10 mg (two 5 mg tablets) by mouth twice a day for 7 days  Followed by 5 mg (one 5 mg tablet) by mouth twice a day                         cyanocobalamin 1,000 mcg tablet   Start taking on:  8/10/2018       Your last dose was: Your next dose is: Take 1 Tab by mouth daily. 1000 mcg                        folic acid 1 mg tablet   Commonly known as:  Morenita   Start taking on:  8/10/2018       Your last dose was: Your next dose is: Take 1 Tab by mouth daily. 1 mg                                 Where to Get Your Medications        Information on where to get these meds will be given to you by the nurse or doctor. !  Ask your nurse or doctor about these medications     apixaban 5 mg (74 tabs) starter pack    cyanocobalamin 1,000 mcg tablet    folic acid 1 mg tablet               The patient's chart, MAR, and AVS were reviewed by   [place], Greta Mason,   Contact: [phone]

## 2018-08-11 LAB
Lab: NORMAL
METHYLMALONATE SERPL-SCNC: 312 NMOL/L (ref 0–378)

## 2019-09-30 ENCOUNTER — APPOINTMENT (OUTPATIENT)
Dept: CT IMAGING | Age: 64
DRG: 045 | End: 2019-09-30
Attending: PHYSICIAN ASSISTANT
Payer: MEDICAID

## 2019-09-30 ENCOUNTER — HOSPITAL ENCOUNTER (INPATIENT)
Age: 64
LOS: 15 days | Discharge: HOME | DRG: 045 | End: 2019-10-17
Attending: EMERGENCY MEDICINE | Admitting: INTERNAL MEDICINE
Payer: MEDICAID

## 2019-09-30 ENCOUNTER — APPOINTMENT (OUTPATIENT)
Dept: MRI IMAGING | Age: 64
DRG: 045 | End: 2019-09-30
Attending: HOSPITALIST
Payer: MEDICAID

## 2019-09-30 ENCOUNTER — APPOINTMENT (OUTPATIENT)
Dept: CT IMAGING | Age: 64
DRG: 045 | End: 2019-09-30
Attending: HOSPITALIST
Payer: MEDICAID

## 2019-09-30 ENCOUNTER — APPOINTMENT (OUTPATIENT)
Dept: GENERAL RADIOLOGY | Age: 64
DRG: 045 | End: 2019-09-30
Attending: PHYSICIAN ASSISTANT
Payer: MEDICAID

## 2019-09-30 DIAGNOSIS — E87.6 HYPOKALEMIA: Primary | ICD-10-CM

## 2019-09-30 DIAGNOSIS — E83.39 HYPOPHOSPHATEMIA: ICD-10-CM

## 2019-09-30 DIAGNOSIS — I63.239 CAROTID STENOSIS, SYMPTOMATIC, WITH INFARCTION (HCC): ICD-10-CM

## 2019-09-30 DIAGNOSIS — I65.21 CAROTID ARTERY THROMBOSIS, RIGHT: ICD-10-CM

## 2019-09-30 DIAGNOSIS — M21.332 LEFT WRISTDROP: ICD-10-CM

## 2019-09-30 PROBLEM — R29.898 LEFT HAND WEAKNESS: Status: ACTIVE | Noted: 2019-09-30

## 2019-09-30 LAB
ALBUMIN SERPL-MCNC: 3.3 G/DL (ref 3.5–5)
ALBUMIN/GLOB SERPL: 0.8 {RATIO} (ref 1.1–2.2)
ALP SERPL-CCNC: 134 U/L (ref 45–117)
ALT SERPL-CCNC: 15 U/L (ref 12–78)
ANION GAP BLD CALC-SCNC: 19 MMOL/L (ref 10–20)
ANION GAP SERPL CALC-SCNC: 11 MMOL/L (ref 5–15)
AST SERPL-CCNC: 22 U/L (ref 15–37)
ATRIAL RATE: 83 BPM
BASOPHILS # BLD: 0.1 K/UL (ref 0–0.1)
BASOPHILS NFR BLD: 0 % (ref 0–1)
BILIRUB SERPL-MCNC: 1 MG/DL (ref 0.2–1)
BUN BLD-MCNC: 5 MG/DL (ref 9–20)
BUN SERPL-MCNC: 8 MG/DL (ref 6–20)
BUN/CREAT SERPL: 8 (ref 12–20)
CA-I BLD-MCNC: 0.96 MMOL/L (ref 1.12–1.32)
CALCIUM SERPL-MCNC: 9.1 MG/DL (ref 8.5–10.1)
CALCULATED P AXIS, ECG09: 35 DEGREES
CALCULATED R AXIS, ECG10: -65 DEGREES
CALCULATED T AXIS, ECG11: 50 DEGREES
CHLORIDE BLD-SCNC: 81 MMOL/L (ref 98–107)
CHLORIDE SERPL-SCNC: 86 MMOL/L (ref 97–108)
CO2 BLD-SCNC: 35 MMOL/L (ref 21–32)
CO2 SERPL-SCNC: 35 MMOL/L (ref 21–32)
COMMENT, HOLDF: NORMAL
CREAT BLD-MCNC: 0.7 MG/DL (ref 0.6–1.3)
CREAT SERPL-MCNC: 0.95 MG/DL (ref 0.7–1.3)
DIAGNOSIS, 93000: NORMAL
DIFFERENTIAL METHOD BLD: ABNORMAL
EOSINOPHIL # BLD: 0 K/UL (ref 0–0.4)
EOSINOPHIL NFR BLD: 0 % (ref 0–7)
ERYTHROCYTE [DISTWIDTH] IN BLOOD BY AUTOMATED COUNT: 17.8 % (ref 11.5–14.5)
GLOBULIN SER CALC-MCNC: 4.1 G/DL (ref 2–4)
GLUCOSE BLD-MCNC: 138 MG/DL (ref 65–100)
GLUCOSE SERPL-MCNC: 120 MG/DL (ref 65–100)
HCT VFR BLD AUTO: 30.9 % (ref 36.6–50.3)
HCT VFR BLD CALC: 33 % (ref 36.6–50.3)
HGB BLD-MCNC: 10.7 G/DL (ref 12.1–17)
IMM GRANULOCYTES # BLD AUTO: 0.2 K/UL (ref 0–0.04)
IMM GRANULOCYTES NFR BLD AUTO: 1 % (ref 0–0.5)
INR PPP: 1.4 (ref 0.9–1.1)
LYMPHOCYTES # BLD: 1.9 K/UL (ref 0.8–3.5)
LYMPHOCYTES NFR BLD: 14 % (ref 12–49)
MAGNESIUM SERPL-MCNC: 1.6 MG/DL (ref 1.6–2.4)
MCH RBC QN AUTO: 34.3 PG (ref 26–34)
MCHC RBC AUTO-ENTMCNC: 34.6 G/DL (ref 30–36.5)
MCV RBC AUTO: 99 FL (ref 80–99)
MONOCYTES # BLD: 1.1 K/UL (ref 0–1)
MONOCYTES NFR BLD: 8 % (ref 5–13)
NEUTS SEG # BLD: 10.9 K/UL (ref 1.8–8)
NEUTS SEG NFR BLD: 77 % (ref 32–75)
NRBC # BLD: 0 K/UL (ref 0–0.01)
NRBC BLD-RTO: 0 PER 100 WBC
P-R INTERVAL, ECG05: 140 MS
PHOSPHATE SERPL-MCNC: 2 MG/DL (ref 2.6–4.7)
PLATELET # BLD AUTO: 274 K/UL (ref 150–400)
PMV BLD AUTO: 9.4 FL (ref 8.9–12.9)
POTASSIUM BLD-SCNC: 2.2 MMOL/L (ref 3.5–5.1)
POTASSIUM SERPL-SCNC: 2.3 MMOL/L (ref 3.5–5.1)
PROT SERPL-MCNC: 7.4 G/DL (ref 6.4–8.2)
PROTHROMBIN TIME: 14.4 SEC (ref 9–11.1)
Q-T INTERVAL, ECG07: 544 MS
QRS DURATION, ECG06: 154 MS
QTC CALCULATION (BEZET), ECG08: 639 MS
RBC # BLD AUTO: 3.12 M/UL (ref 4.1–5.7)
SAMPLES BEING HELD,HOLD: NORMAL
SERVICE CMNT-IMP: ABNORMAL
SODIUM BLD-SCNC: 132 MMOL/L (ref 136–145)
SODIUM SERPL-SCNC: 132 MMOL/L (ref 136–145)
VENTRICULAR RATE, ECG03: 83 BPM
WBC # BLD AUTO: 14.2 K/UL (ref 4.1–11.1)

## 2019-09-30 PROCEDURE — 96366 THER/PROPH/DIAG IV INF ADDON: CPT

## 2019-09-30 PROCEDURE — 74011250636 HC RX REV CODE- 250/636: Performed by: PHYSICIAN ASSISTANT

## 2019-09-30 PROCEDURE — 70450 CT HEAD/BRAIN W/O DYE: CPT

## 2019-09-30 PROCEDURE — 84100 ASSAY OF PHOSPHORUS: CPT

## 2019-09-30 PROCEDURE — 80053 COMPREHEN METABOLIC PANEL: CPT

## 2019-09-30 PROCEDURE — 93005 ELECTROCARDIOGRAM TRACING: CPT

## 2019-09-30 PROCEDURE — 73130 X-RAY EXAM OF HAND: CPT

## 2019-09-30 PROCEDURE — 99218 HC RM OBSERVATION: CPT

## 2019-09-30 PROCEDURE — 99284 EMERGENCY DEPT VISIT MOD MDM: CPT

## 2019-09-30 PROCEDURE — 96365 THER/PROPH/DIAG IV INF INIT: CPT

## 2019-09-30 PROCEDURE — 85610 PROTHROMBIN TIME: CPT

## 2019-09-30 PROCEDURE — 74011000258 HC RX REV CODE- 258: Performed by: RADIOLOGY

## 2019-09-30 PROCEDURE — 83735 ASSAY OF MAGNESIUM: CPT

## 2019-09-30 PROCEDURE — A9575 INJ GADOTERATE MEGLUMI 0.1ML: HCPCS | Performed by: HOSPITALIST

## 2019-09-30 PROCEDURE — 74011250636 HC RX REV CODE- 250/636: Performed by: HOSPITALIST

## 2019-09-30 PROCEDURE — 80047 BASIC METABLC PNL IONIZED CA: CPT

## 2019-09-30 PROCEDURE — 70496 CT ANGIOGRAPHY HEAD: CPT

## 2019-09-30 PROCEDURE — 70498 CT ANGIOGRAPHY NECK: CPT

## 2019-09-30 PROCEDURE — 85025 COMPLETE CBC W/AUTO DIFF WBC: CPT

## 2019-09-30 PROCEDURE — 70553 MRI BRAIN STEM W/O & W/DYE: CPT

## 2019-09-30 PROCEDURE — 36415 COLL VENOUS BLD VENIPUNCTURE: CPT

## 2019-09-30 PROCEDURE — 74011636320 HC RX REV CODE- 636/320: Performed by: RADIOLOGY

## 2019-09-30 RX ORDER — GUAIFENESIN 100 MG/5ML
81 LIQUID (ML) ORAL DAILY
Status: DISCONTINUED | OUTPATIENT
Start: 2019-10-01 | End: 2019-10-02

## 2019-09-30 RX ORDER — POTASSIUM CHLORIDE 7.45 MG/ML
10 INJECTION INTRAVENOUS ONCE
Status: COMPLETED | OUTPATIENT
Start: 2019-09-30 | End: 2019-09-30

## 2019-09-30 RX ORDER — WARFARIN SODIUM 5 MG/1
5 TABLET ORAL DAILY
Status: DISCONTINUED | OUTPATIENT
Start: 2019-10-01 | End: 2019-09-30 | Stop reason: DRUGHIGH

## 2019-09-30 RX ORDER — ACETAMINOPHEN 325 MG/1
650 TABLET ORAL
Status: DISCONTINUED | OUTPATIENT
Start: 2019-09-30 | End: 2019-10-17 | Stop reason: HOSPADM

## 2019-09-30 RX ORDER — SODIUM CHLORIDE 9 MG/ML
75 INJECTION, SOLUTION INTRAVENOUS CONTINUOUS
Status: DISPENSED | OUTPATIENT
Start: 2019-09-30 | End: 2019-10-01

## 2019-09-30 RX ORDER — SODIUM CHLORIDE 0.9 % (FLUSH) 0.9 %
10 SYRINGE (ML) INJECTION
Status: COMPLETED | OUTPATIENT
Start: 2019-09-30 | End: 2019-09-30

## 2019-09-30 RX ORDER — GADOTERATE MEGLUMINE 376.9 MG/ML
15 INJECTION INTRAVENOUS
Status: COMPLETED | OUTPATIENT
Start: 2019-09-30 | End: 2019-09-30

## 2019-09-30 RX ORDER — POTASSIUM CHLORIDE 14.9 MG/ML
10 INJECTION INTRAVENOUS
Status: DISCONTINUED | OUTPATIENT
Start: 2019-09-30 | End: 2019-09-30

## 2019-09-30 RX ORDER — SODIUM CHLORIDE 0.9 % (FLUSH) 0.9 %
10 SYRINGE (ML) INJECTION
Status: DISPENSED | OUTPATIENT
Start: 2019-09-30 | End: 2019-10-01

## 2019-09-30 RX ORDER — ACETAMINOPHEN 650 MG/1
650 SUPPOSITORY RECTAL
Status: DISCONTINUED | OUTPATIENT
Start: 2019-09-30 | End: 2019-10-17 | Stop reason: HOSPADM

## 2019-09-30 RX ADMIN — Medication 10 ML: at 23:04

## 2019-09-30 RX ADMIN — Medication 10 ML: at 18:29

## 2019-09-30 RX ADMIN — SODIUM CHLORIDE 100 ML: 900 INJECTION, SOLUTION INTRAVENOUS at 23:03

## 2019-09-30 RX ADMIN — POTASSIUM CHLORIDE 10 MEQ: 10 INJECTION, SOLUTION INTRAVENOUS at 10:48

## 2019-09-30 RX ADMIN — GADOTERATE MEGLUMINE 15 ML: 376.9 INJECTION INTRAVENOUS at 18:29

## 2019-09-30 RX ADMIN — SODIUM CHLORIDE 75 ML/HR: 900 INJECTION, SOLUTION INTRAVENOUS at 16:34

## 2019-09-30 RX ADMIN — IOPAMIDOL 100 ML: 755 INJECTION, SOLUTION INTRAVENOUS at 23:04

## 2019-09-30 NOTE — PROGRESS NOTES
Admission Medication Reconciliation:    Information obtained from:  patient/pharmacy  RxQuery data available¹:  NO    Comments/Recommendations: Updated PTA meds/reviewed patient's allergies.  -Patient somewhat good historian. Patient knew he was taking a blood thinner but did not know the name or the dose of the warfarin. Called his [facility] to confirm dosing. Medication changes (since last review): Added  - none    Adjusted  - warfarin    Removed  - cyanocobalamin  - folic acid    Social history  - Patient endorses smoking 3/4 pack of cigarettes per day. Has not had a drink since August 6th     [facility] pharmacy benefit data reflects medications filled and processed through the patient's insurance, however   this data does NOT capture whether the medication was picked up or is currently being taken by the patient. Allergies:  Eggplant; Pcn [penicillins]; and Codeine    Significant PMH/Disease States:   Past Medical History:   Diagnosis Date    Alcohol use     Idiopathic gout of multiple sites 6/7/2016    Kidney stone 10/16/2015    Tobacco abuse      Chief Complaint for this Admission:    Chief Complaint   Patient presents with    Hand Pain     Prior to Admission Medications:   Prior to Admission Medications   Prescriptions Last Dose Informant Patient Reported? Taking?   warfarin (COUMADIN) 5 mg tablet 9/30/2019 at Unknown time  Yes Yes   Sig: Take 5 mg by mouth daily. Facility-Administered Medications: None       Please contact the main inpatient pharmacy with any questions or concerns at [phone] and we will direct you to the clinical pharmacist covering this patient's care while in-house.    Hawk Persaud PharmD Candidate [id number]

## 2019-09-30 NOTE — PROGRESS NOTES
Bedside and Verbal shift change report given to [address] (oncoming nurse) by Lexis Aguirre (offgoing nurse). Report included the following information SBAR, Kardex, Intake/Output, MAR, Recent Results and Cardiac Rhythm NSR.

## 2019-09-30 NOTE — ED TRIAGE NOTES
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

## 2019-09-30 NOTE — PROGRESS NOTES
Problem: Pain  Goal: *Control of Pain  Outcome: Progressing Towards Goal     Problem: Falls - Risk of  Goal: *Absence of Falls  Description  Document Eliza Dear Fall Risk and appropriate interventions in the flowsheet.   Outcome: Progressing Towards Goal  Note:   Fall Risk Interventions:                                Problem: TIA/CVA Stroke: 0-24 hours  Goal: Activity/Safety  Outcome: Progressing Towards Goal  Goal: Diagnostic Test/Procedures  Outcome: Progressing Towards Goal  Goal: Nutrition/Diet  Outcome: Progressing Towards Goal  Goal: Discharge Planning  Outcome: Progressing Towards Goal  Goal: Medications  Outcome: Progressing Towards Goal  Goal: Respiratory  Outcome: Progressing Towards Goal  Goal: Treatments/Interventions/Procedures  Outcome: Progressing Towards Goal

## 2019-09-30 NOTE — ED PROVIDER NOTES
This is a 59 yo  male with medical hx remarkable for alcohol use and kidney stone presenting with complaint of left hand weakness for past two days,. He reports dropping things today while at work. He works laying tiles. He used a machine which applied steady pressure to the left wrist area. Past Medical History:   Diagnosis Date    Alcohol use     Idiopathic gout of multiple sites 6/7/2016    Kidney stone 10/16/2015    Tobacco abuse        No past surgical history on file. Family History:   Problem Relation Age of Onset    Dementia Mother     Diabetes Mother     Heart Failure Father        Social History     Socioeconomic History    Marital status:      Spouse name: Not on file    Number of children: Not on file    Years of education: Not on file    Highest education level: Not on file   Occupational History    Not on file   Social Needs    Financial resource strain: Not on file    Food insecurity:     Worry: Not on file     Inability: Not on file    Transportation needs:     Medical: Not on file     Non-medical: Not on file   Tobacco Use    Smoking status: Current Every Day Smoker     Packs/day: 0.75     Years: 40.00     Pack years: 30.00     Types: Cigarettes    Smokeless tobacco: Never Used   Substance and Sexual Activity    Alcohol use: Yes     Alcohol/week: 0.0 standard drinks     Comment: 1 bottle of scotch weekly.     Drug use: No    Sexual activity: Not on file   Lifestyle    Physical activity:     Days per week: Not on file     Minutes per session: Not on file    Stress: Not on file   Relationships    Social connections:     Talks on phone: Not on file     Gets together: Not on file     Attends Gnosticist service: Not on file     Active member of club or organization: Not on file     Attends meetings of clubs or organizations: Not on file     Relationship status: Not on file    Intimate partner violence:     Fear of current or ex partner: Not on file Emotionally abused: Not on file     Physically abused: Not on file     Forced sexual activity: Not on file   Other Topics Concern    Not on file   Social History Narrative    ** Merged History Encounter **              ALLERGIES: Eggplant; Pcn [penicillins]; and Codeine    Review of Systems   Constitutional: Negative. Negative for chills and fever. HENT: Negative for congestion, ear pain, rhinorrhea, sore throat and voice change. Eyes: Negative. Respiratory: Negative for cough and shortness of breath. Cardiovascular: Negative for chest pain. Gastrointestinal: Negative for abdominal pain, constipation, diarrhea and vomiting. Genitourinary: Negative for difficulty urinating, dysuria, frequency and urgency. Musculoskeletal: Positive for arthralgias (left wrist). Negative for joint swelling. Neurological: Positive for weakness (left hand). Negative for numbness and headaches. Psychiatric/Behavioral: Negative for confusion and decreased concentration. All other systems reviewed and are negative. Vitals:    09/30/19 0927 09/30/19 0936   BP:  117/78   Pulse: 84 85   Resp:  16   Temp:  99.4 °F (37.4 °C)   SpO2: 96% 98%            Physical Exam   Constitutional: He is oriented to person, place, and time. He appears well-developed and well-nourished. No distress. Disheveled appearing  male   HENT:   Head: Normocephalic and atraumatic. Right Ear: External ear normal.   Left Ear: External ear normal.   Nose: Nose normal.   Mouth/Throat: Oropharynx is clear and moist. No oropharyngeal exudate. Eyes: Pupils are equal, round, and reactive to light. Conjunctivae and EOM are normal. Right eye exhibits no discharge. Left eye exhibits no discharge. Cardiovascular: Normal rate, regular rhythm and normal heart sounds. Pulmonary/Chest: Effort normal and breath sounds normal. He has no wheezes. He has no rales. Abdominal: Soft. Bowel sounds are normal. He exhibits no distension.  There is no tenderness. There is no guarding. Musculoskeletal:        Left shoulder: Normal.        Left elbow: Normal.        Left wrist: He exhibits decreased range of motion (decreased ability to fully extend). He exhibits no tenderness, no bony tenderness, no swelling, no deformity and no laceration. Arms:  Neurological: He is alert and oriented to person, place, and time. No cranial nerve deficit. Skin: Skin is warm and dry. He is not diaphoretic. Psychiatric: He has a normal mood and affect. His behavior is normal.   Nursing note and vitals reviewed. MDM  Number of Diagnoses or Management Options  Hypokalemia:   Hypophosphatemia:   Left wristdrop:   Diagnosis management comments: 61 yo  male with complaint of left wrist/hand weakness for past two days. 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 No e/o central lesions. Hx of alcohol dependence. ? Electrolyte derangement. Plan  Ct head  CBC  Chem 8  Mag  Phos  xr left hand  Reassess. April C [place], [place]         Amount and/or Complexity of Data Reviewed  Clinical lab tests: ordered and reviewed  Tests in the radiology section of CPT®: ordered and reviewed  Independent visualization of images, tracings, or specimens: yes           Procedures             Progress note    K2.0      Hospitalist Eboni for Admission  11:32 AM    ED Room Number: ER13/13  Patient Name and age:  Francesca Sheffield 60 y.o.  male  Working Diagnosis:   1. Hypokalemia    2. Left wristdrop    3.  Hypophosphatemia      Readmission: no  Isolation Requirements:  no  Recommended Level of Care:  med/surg  Code Status:  Full Code  Department:[facility] - [phone]

## 2019-09-30 NOTE — H&P
[facility]  HISTORY AND PHYSICAL    Name:  Rhonda Philip  MR#:  [id number]  :  1955  ACCOUNT #:  [de-identified]  ADMIT DATE:  2019      PRIMARY CARE PROVIDER:  None. SOURCE OF INFORMATION:  The patient. CHIEF COMPLAINT:  Left hand weakness since quarter to 07:00 a.m. this morning. HISTORY OF PRESENT ILLNESS:  This is a 82-year-old  male with past medical history significant for bilateral lower extremity DVT and bilateral PE, on Coumadin, history of kidney stones, history of alcohol abuse and tobacco abuse, presented to [facility] with left hand weakness that started this morning quarter to 07:00 a.m. He stated that he had left hand weakness around 04:00 p.m. yesterday, and the weakness lasted for 3 hours and went away. This morning quarter to 07:00 a.m., he said he felt like someone put pressure on his left arm. He could not make a fist and decided to come to [facility]. No headache, fever, chills, sweating, left-sided chest pain, palpitation, shortness of breath, cough, abdominal pain, urinary complaint, or lower extremity weakness or swelling. No abnormal bowel movement. He took his Coumadin at 6 a.m. this morning. 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 When he came to ER, his vital signs, blood pressure was 117/78, pulse 85, temperature 99.4, saturation of oxygen 96% on room air. CT scan of the head was done and normal head CT, and labs showed low potassium 2.2, received IV potassium 10 mEq once, and the patient referred to hospitalist service for further evaluation and admission. He had also x-ray of the left hand study, no acute abnormalities. No history of diabetes mellitus, stroke, or heart disease. REVIEW OF SYSTEMS:  Pertinent positive findings mentioned in HPI. All systems reviewed, no any other positive finding.     PAST MEDICAL HISTORY:  1.  Bilateral lower extremity DVT, and bilateral PE, on Coumadin. 2.  History of alcohol abuse. 3.  History of gout. 4.  History of kidney stones. 5.  Tobacco abuse. PRIOR TO ADMISSION MEDICATIONS:  Include Coumadin 5 mg p.o. daily. ALLERGIES:  1.  Eggplant, reaction anaphylaxis. 2.  Penicillin, reaction anaphylaxis, neck swelling. 3.  Codeine, reaction rash, nausea, vomiting, and swelling. SOCIAL HISTORY:  Lives with his family. He smokes 15 cigarettes a day, and stopped alcohol. Ambulates independently. CODE STATUS:  Full code. FAMILY HISTORY:  Mother, history of diabetes and dementia. Father, history of Parkinson's disease and heart failure. PHYSICAL EXAMINATION:  VITAL SIGNS:  Blood pressure 117/78, pulse 85, temperature 99.4, respiratory rate 16, saturation of oxygen 98% on room air. GENERAL APPEARANCE:  The patient is alert, cooperative, not in distress. He appears his stated age. HEENT:  Pink conjunctivae. Anicteric sclerae. Moist tongue and buccal mucosa. LUNGS:  Clear to auscultation bilaterally. CHEST WALL:  No tenderness or deformity. CARDIOVASCULAR:  Regular rate and rhythm. S1 and S2 normal.  No murmur or gallop. ABDOMEN:  Soft, nontender. No mass or organomegaly. Bowel sounds normal.  EXTREMITIES:  No cyanosis or edema. SKIN:  He has multiple bruises on his lower extremities and upper extremities. CENTRAL NERVOUS SYSTEM:  Conscious. Well oriented to time, place, and person. Motor, lower extremities 5/5; left arm, unable to make fist, sensation intact. Cranial nerves II-XII grossly intact. DIAGNOSTICS:  EKG, normal sinus rhythm with premature supraventricular complexes, ventricular rate 83 beats per minute, left axis deviation, right bundle branch block, nonspecific ST-T waves. LABS:  White blood cell count 14.4, hemoglobin 10.5, hematocrit 30.9, MCV 99.0, platelet count 943. INR not done. Chemistry:  Phosphorus 2.0, magnesium 1.6.   Point of care test, sodium 132, potassium 2.2, chloride 81, CO2 85, ionized calcium 0.96. Chemistry has not been done, INR has not been done. ASSESSMENT:  1. Left hand weakness, unclear etiology, rule out acute stroke. 2.  Hypokalemia. 3.  Hypophosphatemia, unclear etiology. 4.  Leukocytosis, unclear etiology. 5.  History of pulmonary embolism and bilateral lower extremity weakness. 6.  Tobacco abuse. PLAN:  1. Left hand weakness, unclear etiology, rule out stroke. Admit the patient under observation. CT head is negative. We will do CTA of head and neck, MRI of the brain, echocardiography, lipid panel, check hemoglobin A1c, and stat INR. The patient takes Coumadin 5 mg daily. will give him aspirin 81 mg p.o. daily. Swallowing evaluation, PT, OT, echocardiography, and continue neuro check, and will involve neurologist.  2.  Hypokalemia. The patient has received 10 mEq IV.  will repeat stat CMP. 3.  Hypophosphatemia. will repeat the stat CMP and if hypophosphatemia is confirmed, we will give Neutra-Phos. 4.  Leukocytosis, unclear etiology. The patient is afebrile, no evidence of infection. will repeat CBC in a.m.  5.  History of bilateral large PE and bilateral lower extremity DVT, on 08/08/2019. The patient takes Coumadin, will resume Coumadin after checked INR, consult pharmacist for Coumadin dosing. 6.  Tobacco abuse. The patient is advised to stop smoking. DVT prophylaxis, the patient on Coumadin. FUNCTIONAL STATUS PRIOR TO ADMISSION:  The patient ambulates independently.       Bety Johnston MD      [id number]  D:  09/30/2019 12:27  T:  09/30/2019 14:33  JOB #:  [id number]

## 2019-10-01 ENCOUNTER — APPOINTMENT (OUTPATIENT)
Dept: NON INVASIVE DIAGNOSTICS | Age: 64
DRG: 045 | End: 2019-10-01
Attending: HOSPITALIST
Payer: MEDICAID

## 2019-10-01 PROBLEM — I65.21 CAROTID ARTERY THROMBOSIS, RIGHT: Status: ACTIVE | Noted: 2019-10-01

## 2019-10-01 PROBLEM — I63.9 STROKE DUE TO EMBOLISM (HCC): Status: ACTIVE | Noted: 2019-10-01

## 2019-10-01 LAB
APTT PPP: 24.5 SEC (ref 22.1–32)
APTT PPP: 29.3 SEC (ref 22.1–32)
ASPIRIN TEST, ASPIRN: 638 ARU
CHOLEST SERPL-MCNC: 94 MG/DL
ECHO AO ROOT DIAM: 3.26 CM
ECHO AV AREA PEAK VELOCITY: 3.3 CM2
ECHO AV PEAK GRADIENT: 11.8 MMHG
ECHO AV PEAK VELOCITY: 171.56 CM/S
ECHO EST RA PRESSURE: 5 MMHG
ECHO LA MAJOR AXIS: 3.87 CM
ECHO LA TO AORTIC ROOT RATIO: 1.19
ECHO LV E' LATERAL VELOCITY: 10.6 CM/S
ECHO LV E' SEPTAL VELOCITY: 7.88 CM/S
ECHO LV INTERNAL DIMENSION DIASTOLIC: 4.68 CM (ref 4.2–5.9)
ECHO LV INTERNAL DIMENSION SYSTOLIC: 3.3 CM
ECHO LV IVSD: 1.11 CM (ref 0.6–1)
ECHO LV MASS 2D: 217.4 G (ref 88–224)
ECHO LV MASS INDEX 2D: 108.2 G/M2 (ref 49–115)
ECHO LV POSTERIOR WALL DIASTOLIC: 1.08 CM (ref 0.6–1)
ECHO LVOT DIAM: 2.32 CM
ECHO LVOT PEAK GRADIENT: 7.3 MMHG
ECHO LVOT PEAK VELOCITY: 134.64 CM/S
ECHO MV A VELOCITY: 83.89 CM/S
ECHO MV AREA PHT: 2.3 CM2
ECHO MV E DECELERATION TIME (DT): 326.1 MS
ECHO MV E VELOCITY: 72.43 CM/S
ECHO MV E/A RATIO: 0.86
ECHO MV E/E' LATERAL: 6.83
ECHO MV E/E' RATIO (AVERAGED): 8.01
ECHO MV E/E' SEPTAL: 9.19
ECHO MV PRESSURE HALF TIME (PHT): 94.6 MS
ECHO PULMONARY ARTERY SYSTOLIC PRESSURE (PASP): 28.2 MMHG
ECHO PV MAX VELOCITY: 151.98 CM/S
ECHO PV PEAK GRADIENT: 9.2 MMHG
ECHO RIGHT VENTRICULAR SYSTOLIC PRESSURE (RVSP): 28.2 MMHG
ECHO TV REGURGITANT MAX VELOCITY: 240.76 CM/S
ECHO TV REGURGITANT PEAK GRADIENT: 23.2 MMHG
EST. AVERAGE GLUCOSE BLD GHB EST-MCNC: 123 MG/DL
HBA1C MFR BLD: 5.9 % (ref 4.2–6.3)
HDLC SERPL-MCNC: 37 MG/DL
HDLC SERPL: 2.5 {RATIO} (ref 0–5)
INR PPP: 1.2 (ref 0.9–1.1)
LDLC SERPL CALC-MCNC: 44.6 MG/DL (ref 0–100)
LIPID PROFILE,FLP: NORMAL
PROTHROMBIN TIME: 11.6 SEC (ref 9–11.1)
THERAPEUTIC RANGE,PTTT: NORMAL SECS (ref 58–77)
THERAPEUTIC RANGE,PTTT: NORMAL SECS (ref 58–77)
TRIGL SERPL-MCNC: 62 MG/DL (ref ?–150)
VLDLC SERPL CALC-MCNC: 12.4 MG/DL

## 2019-10-01 PROCEDURE — 92523 SPEECH SOUND LANG COMPREHEN: CPT

## 2019-10-01 PROCEDURE — 96366 THER/PROPH/DIAG IV INF ADDON: CPT

## 2019-10-01 PROCEDURE — 93306 TTE W/DOPPLER COMPLETE: CPT

## 2019-10-01 PROCEDURE — 74011250636 HC RX REV CODE- 250/636: Performed by: INTERNAL MEDICINE

## 2019-10-01 PROCEDURE — 74011250637 HC RX REV CODE- 250/637: Performed by: HOSPITALIST

## 2019-10-01 PROCEDURE — 85730 THROMBOPLASTIN TIME PARTIAL: CPT

## 2019-10-01 PROCEDURE — 85610 PROTHROMBIN TIME: CPT

## 2019-10-01 PROCEDURE — 74011250637 HC RX REV CODE- 250/637: Performed by: NURSE PRACTITIONER

## 2019-10-01 PROCEDURE — 74011250636 HC RX REV CODE- 250/636: Performed by: NURSE PRACTITIONER

## 2019-10-01 PROCEDURE — 83036 HEMOGLOBIN GLYCOSYLATED A1C: CPT

## 2019-10-01 PROCEDURE — 80061 LIPID PANEL: CPT

## 2019-10-01 PROCEDURE — 36415 COLL VENOUS BLD VENIPUNCTURE: CPT

## 2019-10-01 PROCEDURE — 85576 BLOOD PLATELET AGGREGATION: CPT

## 2019-10-01 PROCEDURE — 74011250637 HC RX REV CODE- 250/637: Performed by: PSYCHIATRY & NEUROLOGY

## 2019-10-01 PROCEDURE — 99218 HC RM OBSERVATION: CPT

## 2019-10-01 PROCEDURE — 96367 TX/PROPH/DG ADDL SEQ IV INF: CPT

## 2019-10-01 RX ORDER — HEPARIN SODIUM 1000 [USP'U]/ML
60 INJECTION, SOLUTION INTRAVENOUS; SUBCUTANEOUS ONCE
Status: COMPLETED | OUTPATIENT
Start: 2019-10-01 | End: 2019-10-01

## 2019-10-01 RX ORDER — HEPARIN SODIUM 10000 [USP'U]/100ML
12-25 INJECTION, SOLUTION INTRAVENOUS
Status: DISCONTINUED | OUTPATIENT
Start: 2019-10-01 | End: 2019-10-11

## 2019-10-01 RX ORDER — ASPIRIN 325 MG
325 TABLET ORAL ONCE
Status: COMPLETED | OUTPATIENT
Start: 2019-10-02 | End: 2019-10-01

## 2019-10-01 RX ORDER — HEPARIN SODIUM 10000 [USP'U]/100ML
12-25 INJECTION, SOLUTION INTRAVENOUS CONTINUOUS
Status: DISCONTINUED | OUTPATIENT
Start: 2019-10-01 | End: 2019-10-01 | Stop reason: SDUPTHER

## 2019-10-01 RX ORDER — ATORVASTATIN CALCIUM 40 MG/1
80 TABLET, FILM COATED ORAL DAILY
Status: DISCONTINUED | OUTPATIENT
Start: 2019-10-01 | End: 2019-10-17 | Stop reason: HOSPADM

## 2019-10-01 RX ADMIN — ASPIRIN 325 MG ORAL TABLET 325 MG: 325 PILL ORAL at 23:51

## 2019-10-01 RX ADMIN — HEPARIN SODIUM 4980 UNITS: 1000 INJECTION INTRAVENOUS; SUBCUTANEOUS at 19:49

## 2019-10-01 RX ADMIN — ATORVASTATIN CALCIUM 80 MG: 40 TABLET, FILM COATED ORAL at 10:03

## 2019-10-01 RX ADMIN — HEPARIN SODIUM AND DEXTROSE 12 UNITS/KG/HR: 10000; 5 INJECTION INTRAVENOUS at 12:23

## 2019-10-01 RX ADMIN — ASPIRIN 81 MG CHEWABLE TABLET 81 MG: 81 TABLET CHEWABLE at 10:03

## 2019-10-01 NOTE — PROGRESS NOTES
Problem: Pain  Goal: *Control of Pain  Outcome: Progressing Towards Goal     Problem: Falls - Risk of  Goal: *Absence of Falls  Description  Document Roula Ruts Fall Risk and appropriate interventions in the flowsheet.   Outcome: Progressing Towards Goal  Note:   Fall Risk Interventions:                                Problem: TIA/CVA Stroke: 0-24 hours  Goal: Activity/Safety  Outcome: Progressing Towards Goal  Goal: Consults, if ordered  Outcome: Progressing Towards Goal  Goal: Diagnostic Test/Procedures  Outcome: Progressing Towards Goal  Goal: Nutrition/Diet  Outcome: Progressing Towards Goal  Goal: Discharge Planning  Outcome: Progressing Towards Goal  Goal: Medications  Outcome: Progressing Towards Goal  Goal: Respiratory  Outcome: Progressing Towards Goal  Goal: Treatments/Interventions/Procedures  Outcome: Progressing Towards Goal  Goal: Psychosocial  Outcome: Progressing Towards Goal  Goal: *Hemodynamically stable  Outcome: Progressing Towards Goal  Goal: *Neurologically stable  Description  Absence of additional neurological deficits    Outcome: Progressing Towards Goal  Goal: *Verbalizes anxiety and depression are reduced or absent  Outcome: Progressing Towards Goal  Goal: *Absence of Signs of Aspiration on Current Diet  Outcome: Progressing Towards Goal  Goal: *Absence of deep venous thrombosis signs and symptoms(Stroke Metric)  Outcome: Progressing Towards Goal  Goal: *Ability to perform ADLs and demonstrates progressive mobility and function  Outcome: Progressing Towards Goal  Goal: *Stroke education started(Stroke Metric)  Outcome: Progressing Towards Goal  Goal: *Dysphagia screen performed(Stroke Metric)  Outcome: Progressing Towards Goal  Goal: *Rehab consulted(Stroke Metric)  Outcome: Progressing Towards Goal

## 2019-10-01 NOTE — PROGRESS NOTES
Problem: Communication Impaired (Adult)  Goal: *Acute Goals and Plan of Care (Insert Text)  Description  Speech pathology goals  Initiated 10/1/2019  1. Patient will achieve 90% intelligibility with tongue twisters given no cues within 7 days  2. Patient will answer complex yes/no questions with 90% accuracy given no cues within 7 days   Outcome: Progressing Towards Goal     SPEECH LANGUAGE PATHOLOGY EVALUATION  Patient: Francesca Sheffield (51 y.o. male)  Date: 10/1/2019  Primary Diagnosis: Left hand weakness [R29.898]        Precautions:        ASSESSMENT :  Based on the objective data described below, the patient presents with minimal dysarthria characterized by low vocal volume and patient report of slurred speech, although patient 100% intelligible in conversation. Patient also with mild auditory comprehension/processing deficits with decreased reliability to complex yes/no questions, however possible that this was related to patient reported drowsiness. Patient very concerned regarding deficits despite mild severity, so will follow for SLP treatment to improve higher level communication. Of note, patient with [place] accent at baseline. MRI showed scattered small acute cortical/subcortical infarcts throughout the right  frontal, parietal, and occipital lobes. Note patient passed the STAND and a regular diet was ordered. RN reported no concerns regarding dysphagia. Patient informally observed drinking thin liquid via straw with no difficulty. Patient will benefit from skilled intervention to address the above impairments. Patients rehabilitation potential is considered to be Good  Factors which may influence rehabilitation potential include:   ? None noted  ? Mental ability/status  ? Medical condition  ? Home/family situation and support systems  ? Safety awareness  ? Pain tolerance/management  ?               Other:      PLAN :  Recommendations and Planned Interventions:  --SLP to follow for speech/language treatment  Frequency/Duration: Patient will be followed by speech-language pathology 2 times a week to address goals. Discharge Recommendations: To Be Determined     SUBJECTIVE:   Patient stated My speech sounds like I'm drunk.  Ox4. OBJECTIVE:     Past Medical History:   Diagnosis Date    Alcohol use     Idiopathic gout of multiple sites 6/7/2016    Kidney stone 10/16/2015    Tobacco abuse    History reviewed. No pertinent surgical history. Prior Level of Function/Home Situation: Patient lives with wife, dog, and 2 cats. Independent with all ADLs and IADLs. Drives. Works as [employer].    Home Situation  Home Environment: Private residence  # Steps to Enter: 4  One/Two Story Residence: One story  Living Alone: No  Support Systems: Spouse/Significant Other/Partner  Patient Expects to be Discharged to[de-identified] Private residence  Current DME Used/Available at Home: None  Motor Speech:  Oral-Motor Structure/Motor Speech  Labial: Other (comment)(difficult to assess due to facial hair)  Dentition: Natural;Limited;Poor  Oral Hygiene: moist oral mucosa  Lingual: No impairment  Velum: Unable to visualize  Mandible: No impairment  Apraxic Characteristics: None  Dysarthric Characteristics: Other (comment)(low vocal volume)  Intelligibility: No impairment  Word Intelligibility (%): 100 %  Phrase Intelligibility (%): 100 %  Sentence Intelligibility (%): 100 %  Conversation Intelligibility (%): 100 %  Overall Impairment Severity: Minimal  Language Comprehension and Expression:  Auditory Comprehension  Auditory Impairment: Yes  Response to Complex Yes/No Questions (%) : 70 %  Complex Commands (%): 80 %  Verbal Expression  Primary Mode of Expression: Verbal  Repetition: No impairment  Naming: No impairment, 100%  Sentence Formulation: No impairment, 100%  Conversation: Dysarthric  Overall Impairment: Mild        Neuro-Linguistics: Patient recalled information from neurologist after 15 minutes. NOMS:   The NOMS functional outcome measure was used to quantify this patient's level of motor speech impairment. Based on the NOMS, the patient was determined to be at level 6 for motor speech function. NOMS Motor Speech:  Level 1 (CN):  100% unintelligible  Level 2 (CM):  Communication partner responsible for message; can do CV or automatic words w/ max cues but rarely intelligible in context  Level 3 (CL): communication partner primarily responsible for message but says CV/automatic words intelligibly; mod cues to say simple words/phrases  Level 4 (CK): In structured conversation with familiar listener can say simple words and phrases. Mod cues for simple sentences  Level 5 (CJ):  Uses simple sentences for ADLs with familiar and unfamiliar listener; min cues for complex sentences  Level 6 (CI):  Intelligible in ADLs; difficulty in voc/social activites; rare cues for complex message; uses comp strategies  Level 7 ([address] [place] [address]):  Intelligible in all activities. May occasionally use compensatory strategies. DEANNE. (2003). National Outcomes Measurement System (NOMS): Adult Speech-Language Pathology User's Guide. Pain:  Pain Scale 1: Numeric (0 - 10)  Pain Intensity 1: 0     After treatment:   ?              Patient left in no apparent distress sitting up in chair  ? Patient left in no apparent distress in bed  ? Call bell left within reach  ? Nursing notified  ? Caregiver present  ? Bed alarm activated    COMMUNICATION/EDUCATION:   The patients plan of care including recommendations and planned interventions was discussed with: Physical Therapist and Registered Nurse. Patient was educated regarding His deficit(s) of dysarthria as this relates to His diagnosis of CVA. He demonstrated Excellent understanding as evidenced by verbalizing understanding.   ? Patient/family have participated as able in goal setting and plan of care. ?  Patient/family agree to work toward stated goals and plan of care. ?  Patient understands intent and goals of therapy, but is neutral about his/her participation. ? Patient is unable to participate in goal setting and plan of care.     Thank you for this referral.  Lalita Lemus, SLP  Time Calculation: 26 mins

## 2019-10-01 NOTE — PROGRESS NOTES
Bedside and Verbal shift change report given to JAMES Shah (oncoming nurse) by Primus Area (offgoing nurse). Report included the following information SBAR, Kardex, Intake/Output, MAR, Recent Results and Cardiac Rhythm NSR.

## 2019-10-01 NOTE — PROGRESS NOTES
CM attempted to conduct initial assessment. Pt was eating lunch and requested for CM to return later. CM to follow.    CAROL Sanchez,CRM

## 2019-10-01 NOTE — PROGRESS NOTES
Physical Therapy: Defer    Patient currently with carotid blockage believed to be the cause of his scattered acute punctate strokes. Will await recommendations for intervention and follow up later as able and appropriate. Thank you    [phone]: Chart reviewed and discussed with RN. Patient started on heparin with baseline aPTT of 29.3. Per protocol, will work with patient when aPTT is therapeutic (58-92). Thank you.      Nancy Rust, PT, DPT

## 2019-10-01 NOTE — CONSULTS
INPATIENT NEUROLOGY CONSULTATION  10/1/2019     Consulted by: Bharti Harden MD        Patient ID:  Campbell Langston  [id number]  60 y.o.  1955    CC: Painless weakness    HPI    Mr. Campbell Langsotn is a 62-year-old gentleman with a history of daily tobacco use, history of alcoholism discontinued in August of this year, DVT/PE on Coumadin since 2019 here because in the past couple weeks he is noticed some subtle gradual changes. About 2 weeks ago he noticed his walking was not normal.  He felt drunk despite not having any alcohol.  2 days ago he felt like his left hand became poorly coordinated. He also felt a little bit of numbness in the right arm. He could not use his hand to operate and ignition. He is right-hand dominant. No headache. No difficulty swallowing. On presentation yesterday MRI was done showing scattered right-sided infarcts. CTA preliminary currently showing severe right carotid stenosis with risk for embolization. He is on Coumadin but his INR on arrival is 1.2. He is not sure what his range should be. Review of Systems   Constitutional: Positive for malaise/fatigue. Eyes: Negative for double vision. Musculoskeletal: Negative for falls. Neurological: Positive for sensory change, focal weakness and weakness. Negative for headaches. All other systems reviewed and are negative.       Past Medical History:   Diagnosis Date    Alcohol use     Idiopathic gout of multiple sites 6/7/2016    Kidney stone 10/16/2015    Tobacco abuse      Family History   Problem Relation Age of Onset    Dementia Mother     Diabetes Mother     Heart Failure Father      Social History     Socioeconomic History    Marital status:      Spouse name: Not on file    Number of children: Not on file    Years of education: Not on file    Highest education level: Not on file   Occupational History    Not on file   Social Needs    Financial resource strain: Not on file   [employer] insecurity:     Worry: Not on file     Inability: Not on file    Transportation needs:     Medical: Not on file     Non-medical: Not on file   Tobacco Use    Smoking status: Current Every Day Smoker     Packs/day: 0.75     Years: 40.00     Pack years: 30.00     Types: Cigarettes    Smokeless tobacco: Never Used   Substance and Sexual Activity    Alcohol use: Yes     Alcohol/week: 0.0 standard drinks     Comment: 1 bottle of scotch weekly.     Drug use: No    Sexual activity: Not on file   Lifestyle    Physical activity:     Days per week: Not on file     Minutes per session: Not on file    Stress: Not on file   Relationships    Social connections:     Talks on phone: Not on file     Gets together: Not on file     Attends Religion service: Not on file     Active member of club or organization: Not on file     Attends meetings of clubs or organizations: Not on file     Relationship status: Not on file    Intimate partner violence:     Fear of current or ex partner: Not on file     Emotionally abused: Not on file     Physically abused: Not on file     Forced sexual activity: Not on file   Other Topics Concern    Not on file   Social History Narrative    ** Merged History Encounter **          Current Facility-Administered Medications   Medication Dose Route Frequency    acetaminophen (TYLENOL) tablet 650 mg  650 mg Oral Q4H PRN    Or    acetaminophen (TYLENOL) solution 650 mg  650 mg Per NG tube Q4H PRN    Or    acetaminophen (TYLENOL) suppository 650 mg  650 mg Rectal Q4H PRN    0.9% sodium chloride infusion  75 mL/hr IntraVENous CONTINUOUS    aspirin chewable tablet 81 mg  81 mg Oral DAILY    Warfarin -  Pharmacy to Dose   Other Rx Dosing/Monitoring     Allergies   Allergen Reactions    Eggplant Anaphylaxis    Pcn [Penicillins] Anaphylaxis and Swelling    Codeine Rash, Nausea and Vomiting and Swelling       Visit Vitals  BP 95/56   Pulse 84   Temp 98.7 °F (37.1 °C)   Resp 20   Wt 83.2 kg (183 lb 8 oz)   SpO2 93%   BMI 26.33 kg/m²     Physical Exam   Constitutional: He appears well-developed and well-nourished. Cardiovascular: Normal rate. Pulmonary/Chest: Effort normal.   Skin: Skin is warm and dry. Psychiatric: His behavior is normal.   Vitals reviewed. Neurologic Exam     Mental Status   Adult gentleman in bed awake and alert who can give me good historical details. Pupils are equal, gaze is conjugate, EOMI, visual fields full  Face is asymmetric to the left, tongue is midline speech is mildly dysarthric but no aphasia  Motor testing revealing a left-sided weakness 4+ arm and leg  Sensation grossly intact without neglect  No ataxia  Gait deferred            Lab Results   Component Value Date/Time    WBC 14.2 (H) 09/30/2019 10:45 AM    HGB 10.7 (L) 09/30/2019 10:45 AM    HCT 30.9 (L) 09/30/2019 10:45 AM    Hematocrit (POC) 33 (L) 09/30/2019 09:55 AM    PLATELET 799 89/28/5486 10:45 AM    MCV 99.0 09/30/2019 10:45 AM     Lab Results   Component Value Date/Time    Hemoglobin A1c 5.9 10/01/2019 03:50 AM    Hemoglobin A1c 6.0 10/10/2015 02:20 AM    Glucose 120 (H) 09/30/2019 10:15 AM    Glucose (POC) 138 (H) 09/30/2019 09:55 AM    LDL, calculated 44.6 10/01/2019 03:50 AM    Creatinine (POC) 0.7 09/30/2019 09:55 AM    Creatinine 0.95 09/30/2019 10:15 AM      Lab Results   Component Value Date/Time    Cholesterol, total 94 10/01/2019 03:50 AM    HDL Cholesterol 37 10/01/2019 03:50 AM    LDL, calculated 44.6 10/01/2019 03:50 AM    Triglyceride 62 10/01/2019 03:50 AM    CHOL/HDL Ratio 2.5 10/01/2019 03:50 AM     Lab Results   Component Value Date/Time    ALT (SGPT) 15 09/30/2019 10:15 AM    AST (SGOT) 22 09/30/2019 10:15 AM    Alk.  phosphatase 134 (H) 09/30/2019 10:15 AM    Bilirubin, direct 0.2 08/09/2018 03:24 AM    Bilirubin, total 1.0 09/30/2019 10:15 AM    Albumin 3.3 (L) 09/30/2019 10:15 AM    Protein, total 7.4 09/30/2019 10:15 AM    INR 1.2 (H) 10/01/2019 03:50 AM    Prothrombin time 11.6 (H) 10/01/2019 03:50 AM    PLATELET 135 28/70/0158 10:45 AM        CT Results (maximum last 3): Results from [place] encounter on 09/30/19   CTA NECK    Narrative *PRELIMINARY REPORT*    Soft tissue atherosclerosis of the right carotid bifurcation extends into the  extradural and internal carotid arteries and causes at least moderate stenosis. Patient is at risk for embolization from this soft tissue thrombus. No  intracranial flow-limiting stenosis or occlusion. No pathologic intracranial  enhancement. No CT evidence of acute infarct. Cervical spine degenerative disc  disease and stenoses. Centrilobular emphysema. Preliminary report was provided by Dr. Lucy Alba, the on-call radiologist, at 2325  hours. Final report to follow. *END PRELIMINARY REPORT*               CTA HEAD    Narrative *PRELIMINARY REPORT*    Soft tissue atherosclerosis of the right carotid bifurcation extends into the  extradural and internal carotid arteries and causes at least moderate stenosis. Patient is at risk for embolization from this soft tissue thrombus. No  intracranial flow-limiting stenosis or occlusion. No pathologic intracranial  enhancement. No CT evidence of acute infarct. Cervical spine degenerative disc  disease and stenoses. Centrilobular emphysema. Preliminary report was provided by Dr. Lucy Alba, the on-call radiologist, at 2325  hours. Final report to follow. *END PRELIMINARY REPORT*                   MRI Results (maximum last 3): Results from [place] encounter on 09/30/19   MRI BRAIN W WO CONT    Narrative EXAM:  MRI BRAIN W WO CONT    INDICATION:    left hand weakness    COMPARISON:  CT head 9/30/2019. CONTRAST: 10 ml Dotarem. TECHNIQUE:    Multiplanar multisequence acquisition without and with contrast of the brain.     FINDINGS:  Scattered small acute cortical/subcortical infarcts throughout the right  frontal, parietal, and occipital lobes, predominantly in the frontoparietal  lobes. No evidence of acute hemorrhage. The ventricles are normal in size and position. There is no extra-axial fluid  collection or mass effect. There is no cerebellar tonsillar herniation. Expected  arterial flow-voids are present. No evidence of abnormal enhancement. The paranasal sinuses, mastoid air cells, and middle ears are clear. The orbital  contents are within normal limits. No significant osseous or scalp lesions are  identified. Impression IMPRESSION:     1. Scattered small acute cortical/subcortical infarcts throughout the right  frontal, parietal, and occipital lobes. 23X           VAS/US/Carotid Doppler Results (maximum last 3): Results from [place] encounter on 08/08/18   DUPLEX LOWER EXT VENOUS BILAT       PET Results (maximum last 3): No results found for this or any previous visit. Assessment and Plan        62-year-old gentleman who has symptomatic right carotid disease with subsequent small infarcts. This explains his symptoms over the course of a couple weeks. Aspirin has been started and statin will be added. I reviewed the imaging. Okay to continue anticoagulation for PE/DVT since the infarcts are small. Neuro interventional has been consulted for question of symptomatic carotid disease. I spoke with him at length about the current situation. Any unusual acute changes please activate code stroke. Complete stroke work-up as well to include echo, continue telemetry. During this evaluation, we also discussed stroke education to include signs and symptoms of stroke and TIA. This clinical note was dictated with an electronic dictation software that can make unintentional errors. If there are any questions, please contact me directly for clarification.       Britni Stein DO  NEUROLOGIST  Diplomate ABPN  10/1/2019

## 2019-10-01 NOTE — CONSULTS
Neurointerventional Surgery Consult    Patient: Geno Goddard MRN: [id number]  SSN: [id number]    YOB: 1955  Age: 60 y.o. Sex: male      Subjective:      Geno Goddard is a 60 y.o. male is a right handed dominant male with a past medical history significant for bilateral lower extremity DVT, PE on Coumadin, history of kidney stones, alcohol and tobacco abuse. He presented to [facility] with left hand weakness around 7 am on 9/30/19. He found that he was unable to use his hand to operate a vehicle and start ignition. He noticed that his walking was not normal about two weeks ago. 2 days ago his hand became poorly coordinated. MRI on presentation showed scattered right sided infarcts and CTA shows a large mural thrombus within the right carotid artery causing moderate stenosis with risk for embolization. He has been on coumadin since being diagnosed with DVT/PE however his INR on arrival is subtherapeutic at 1.2. He cannot remember when he was started on Coumadin. Currently he denies headache, numbness/tingling, nausea/vomiting, speech or visual changes, gait imbalance. He endorses weakness to the left arm and hand stating that his hand feels \"heavy\" and \"dead\". Past Medical History:   Diagnosis Date    Alcohol use     Idiopathic gout of multiple sites 6/7/2016    Kidney stone 10/16/2015    Tobacco abuse      History reviewed. No pertinent surgical history. Family History   Problem Relation Age of Onset    Dementia Mother     Diabetes Mother     Heart Failure Father      Social History     Tobacco Use    Smoking status: Current Every Day Smoker     Packs/day: 0.75     Years: 40.00     Pack years: 30.00     Types: Cigarettes    Smokeless tobacco: Never Used   Substance Use Topics    Alcohol use: Yes     Alcohol/week: 0.0 standard drinks     Comment: 1 bottle of scotch weekly.       Current Facility-Administered Medications   Medication Dose Route Frequency Provider Last Rate Last Dose    atorvastatin (LIPITOR) tablet 80 mg  80 mg Oral DAILY Sara Delarosa DO   80 mg at 10/01/19 1003    heparin 25,000 units in D5W 250 ml infusion  12-25 Units/kg/hr IntraVENous TITRATE Jackelyn Victor NP 10 mL/hr at 10/01/19 1541 12.019 Units/kg/hr at 10/01/19 1541    acetaminophen (TYLENOL) tablet 650 mg  650 mg Oral Q4H PRN Sierra Dennis MD        Or   Havenadriana Riggs acetaminophen (TYLENOL) solution 650 mg  650 mg Per NG tube Q4H PRN Sierra Dennis MD        Or    acetaminophen (TYLENOL) suppository 650 mg  650 mg Rectal Q4H PRN Sierra Dennis MD        aspirin chewable tablet 81 mg  81 mg Oral DAILY Sierra Dennis MD   81 mg at 10/01/19 1003        Allergies   Allergen Reactions    Eggplant Anaphylaxis    Pcn [Penicillins] Anaphylaxis and Swelling    Codeine Rash, Nausea and Vomiting and Swelling       Review of Systems:  A comprehensive review of systems was negative except for that written in the History of Present Illness. Objective:     Vitals:    10/01/19 0557 10/01/19 1000 10/01/19 1130 10/01/19 1415   BP:  94/57  97/49   Pulse:  66  67   Resp:  14  18   Temp:  98.5 °F (36.9 °C)  98.3 °F (36.8 °C)   SpO2:  93%  96%   Weight: 183 lb 8 oz (83.2 kg)  183 lb (83 kg)    Height:   5' 10\" (1.778 m)         Physical Exam:  GENERAL: alert, cooperative, no distress, appears stated age  LUNG: clear to auscultation bilaterally  HEART: regular rate and rhythm, S1, S2 normal, no murmur, click, rub or gallop  ABDOMEN: soft, non-tender. Bowel sounds normal. No masses,  no organomegaly  SKIN: Normal.    Neurologic Exam:  Mental Status:  Alert and oriented x 4. Appropriate affect, mood and behavior. Language:    Normal fluency, repetition, comprehension and naming. Cranial Nerves:   Normal appearing fundi, sharp discs. Pupils equal, round and reactive to light. Visual fields full to confrontation.      Extraocular movements intact     Facial sensation intact V1 - V3.     Full facial strength, no asymmetry. Hearing intact bilaterally. No dysarthria. Tongue protrudes to midline, palate elevates symmetrically. Shoulder shrug 5/5 bilaterally. Motor:    No pronator drift. Bulk and tone normal.      5/5 power in all right sided extremities proximally and distally. 4/5 power in left arm and hand , 5/5 power in left leg      No involuntary movements. Sensation:    Sensation intact throughout to light touch, temperature      Coordination & Gait: Deferred. FTN intact. Imaging:    CTA Head/Neck 9/30/2019:     IMPRESSION:      CTA Head:  1. No evidence of significant stenosis or aneurysm.     CTA Neck:  1. Large wall adherent fibrofatty plaque/thrombus in the distal right common  carotid artery extending to the bifurcation, with short segment extension of  intraluminal thrombus into the proximal right internal carotid artery. There is  overall moderate stenosis of the distal right common carotid artery and mild  stenosis of the proximal right internal carotid artery. 2. Long segment extension of intraluminal thrombus into the proximal and mid  right external carotid artery with moderate stenosis.     MRI Brain WO Contrast 9/30/2019:    IMPRESSION:      1. Scattered small acute cortical/subcortical infarcts throughout the right  frontal, parietal, and occipital lobes.         Assessment:     Hospital Problems  Date Reviewed: 8/8/2018          Codes Class Noted POA    Left hand weakness ICD-10-CM: R29.898  ICD-9-CM: 728.87  9/30/2019 Unknown            This is a 60year old male with delayed presentation to the hospital with left hand weakness. He was found to have scattered small acute cortical/subcortical infarcts throughout the right frontal, parietal and occipital lobes.  CTA revealed large wall adherent thrombus in the distal right common carotid artery extending to the carotid bifurcation which is causing moderate stenosis. We are consulted for these reasons. Plan:     - start patient on heparin gtt/aspirin for right CCA thrombus ( discussed with neurology and hospitalist)   - level of care should be intermediate for closer monitoring and frequent neuro checks  - recheck CTA in 24-48 hours to evaluate thrombus   - patient should undergo hypercoagulable workup if he has not had already with recent bilateral DVT/PE clots as well  - agree with continued full stroke work up      Thank you for this consult and participating in the care of this patient. I have discussed the diagnosis with the patient and the intended plan as seen in the above orders. Patient is in agreement. Signed By: Taryn Bergman NP     October 1, 2019      I have personally seen and examined the patient. I have personally reviewed the chart and images. Elements of my examination included history of present illness, review of systems, review of past medical and surgical history, review of medications, and physical and neurological examination. I have personally reviewed the findings and impressions with my nurse practitioner and am in agreement with her note. Ofelia Salas M.D.

## 2019-10-01 NOTE — ADVANCED PRACTICE NURSE
Received consult for right carotid artery stenosis. Reviewed imaging along with Dr. Luis Armando Jennings. Patient has right carotid artery clot which starts at the bifurcation and extends in the extradural and internal carotid arteries. Likely the cause of his scattered small acute punctate strokes in the right frontal , parietal, and occipital lobes. No intervention is appropriate at this point, we recommend heparin drip and aspirin at this time. Orders have been placed. Patient seen and examined. Full consult note to follow.      Maren Persaud NP  NIS

## 2019-10-01 NOTE — PROGRESS NOTES
Hospitalist Progress Note  Joao Alvarado MD  Answering service: [phone] from in house phone        Date of Service:  10/1/2019  NAME:  Mojgan Amos  :  1955  MRN:  [id number]      Admission Summary: This is a 53-year-old  male with past medical history significant for bilateral lower extremity DVT and bilateral PE, on Coumadin, history of kidney stones, history of alcohol abuse and tobacco abuse, presented to [address] [place] [address] Emergency Department with left hand weakness that started this morning quarter to 07:00 a.m. He stated that he had left hand weakness around 04:00 p.m. yesterday, and the weakness lasted for 3 hours and went away. This morning quarter to 07:00 a.m., he said he felt like someone put pressure on his left arm. He could not make a fist and decided to come to [address] [place] [address] Emergency Department. No headache, fever, chills, sweating, left-sided chest pain, palpitation, shortness of breath, cough, abdominal pain, urinary complaint, or lower extremity weakness or swelling. No abnormal bowel movement. He took his Coumadin at 6 a.m. this morning. Interval history / Subjective:   Patient seen and evaluated. He was admitted for weakness left hand weakness. CTA showing significant right carotid artery disease. He is now on heparin drip. Neurointervention consulted. Patient says that the weakness has not changed,still present,not worse. No major event being reported by nurse. Assessment & Plan: 1. Symptomatic right carotid disease with subsequent small infarcts    -Pt presented with left hand weakness and CTA showed large wall adherent fibrofatty plaque/thrombus in the distal right common carotid artery extending to the bifurcation.  -Neurointervention consulted. Pt is on heparin drip. Will continue to follow recommendations.  -On statin. -ECO c/w EF 56 - 60%.  No regional wall motion abnormality noted.  -Educated about stroke       2. Hypokalemia. -Replacing.  -Repeat lab    3. Hypophosphatemia.  -On Neutra-Phos. 4.Leukocytosis, unclear etiology.   -The patient is afebrile, no evidence of infection. will repeat CBC in a.m. 5.History of bilateral large PE and bilateral lower extremity DVT, on 08/08/2019.    -Pt took Coumadin. Now on heparin    6. Tobacco abuse.    -The patient is advised to stop smoking.     Code status:full code  DVT prophylaxis:on heparin  Care Plan discussed with: Patient/Family  Disposition: TBD     Hospital Problems  Date Reviewed: 8/8/2018          Codes Class Noted POA    Left hand weakness ICD-10-CM: R29.898  ICD-9-CM: 728.87  9/30/2019 Unknown                Review of Systems:   A comprehensive review of systems was negative except for that written in the HPI. Vital Signs:    Last 24hrs VS reviewed since prior progress note. Most recent are:  Visit Vitals  BP 97/49 (BP 1 Location: Left arm, BP Patient Position: At rest)   Pulse 67   Temp 98.3 °F (36.8 °C)   Resp 18   Ht 5' 10\" (1.778 m)   Wt 83 kg (183 lb)   SpO2 96%   BMI 26.26 kg/m²         Intake/Output Summary (Last 24 hours) at 10/1/2019 1520  Last data filed at 10/1/2019 1500  Gross per 24 hour   Intake 120 ml   Output    Net 120 ml        Physical Examination:         Constitutional:  No acute distress, cooperative, pleasant    ENT:  Oral mucous moist, oropharynx benign. Resp:  CTA bilaterally. No wheezing/rhonchi/rales. No accessory muscle use   CV:  Regular rhythm, normal rate, no murmurs, gallops, rubs    GI:  Soft, non distended, non tender. normoactive bowel sounds, no hepatosplenomegaly     Musculoskeletal:  No edema, warm, 2+ pulses throughout    Neurologic:  Moves all extremities. However has some weakness left arm. AAOx3. Psych:  Good insight, Not anxious nor agitated.        Data Review:    Review and/or order of clinical lab test      Labs:     Recent Labs     09/30/19  1045   WBC 14.2*   HGB 10.7*   HCT 30.9*        Recent Labs     09/30/19  1017 09/30/19  1015   NA  --  132*   K  --  2.3*   CL  --  86*   CO2  --  35*   BUN  --  8   CREA  --  0.95   GLU  --  120*   CA  --  9.1   MG 1.6  --    PHOS 2.0*  --      Recent Labs     09/30/19  1015   SGOT 22   ALT 15   *   TBILI 1.0   TP 7.4   ALB 3.3*   GLOB 4.1*     Recent Labs     10/01/19  1225 10/01/19  0350 09/30/19  1310   INR  --  1.2* 1.4*   PTP  --  11.6* 14.4*   APTT 29.3  --   --       No results for input(s): FE, TIBC, PSAT, FERR in the last 72 hours. Lab Results   Component Value Date/Time    Folate 4.4 (L) 08/09/2018 03:24 AM      No results for input(s): PH, PCO2, PO2 in the last 72 hours. No results for input(s): CPK, CKNDX, TROIQ in the last 72 hours.     No lab exists for component: CPKMB  Lab Results   Component Value Date/Time    Cholesterol, total 94 10/01/2019 03:50 AM    HDL Cholesterol 37 10/01/2019 03:50 AM    LDL, calculated 44.6 10/01/2019 03:50 AM    Triglyceride 62 10/01/2019 03:50 AM    CHOL/HDL Ratio 2.5 10/01/2019 03:50 AM     Lab Results   Component Value Date/Time    Glucose (POC) 138 (H) 09/30/2019 09:55 AM     Lab Results   Component Value Date/Time    Color YELLOW/STRAW 08/27/2015 05:27 PM    Appearance CLEAR 08/27/2015 05:27 PM    Specific gravity 1.017 08/27/2015 05:27 PM    pH (UA) 7.0 08/27/2015 05:27 PM    Protein NEGATIVE  08/27/2015 05:27 PM    Glucose NEGATIVE  08/27/2015 05:27 PM    Ketone 40 (A) 08/27/2015 05:27 PM    Bilirubin NEGATIVE  08/27/2015 05:27 PM    Urobilinogen 0.2 08/27/2015 05:27 PM    Nitrites NEGATIVE  08/27/2015 05:27 PM    Leukocyte Esterase NEGATIVE  08/27/2015 05:27 PM    Epithelial cells FEW 08/27/2015 05:27 PM    Bacteria NEGATIVE  08/27/2015 05:27 PM    WBC 0-4 08/27/2015 05:27 PM    RBC 0-5 08/27/2015 05:27 PM         Medications Reviewed:     Current Facility-Administered Medications   Medication Dose Route Frequency    atorvastatin (LIPITOR) tablet 80 mg  80 mg Oral DAILY  heparin 25,000 units in D5W 250 ml infusion  12-25 Units/kg/hr IntraVENous TITRATE    acetaminophen (TYLENOL) tablet 650 mg  650 mg Oral Q4H PRN    Or    acetaminophen (TYLENOL) solution 650 mg  650 mg Per NG tube Q4H PRN    Or    acetaminophen (TYLENOL) suppository 650 mg  650 mg Rectal Q4H PRN    0.9% sodium chloride infusion  75 mL/hr IntraVENous CONTINUOUS    aspirin chewable tablet 81 mg  81 mg Oral DAILY     ______________________________________________________________________  EXPECTED LENGTH OF STAY: - - -  ACTUAL LENGTH OF STAY:          0                 Lara Grant MD

## 2019-10-01 NOTE — PROGRESS NOTES
Occupational Therapy Note:     Spoke with PT who was present during neuroevaluation. Pt with carotid blockage noted on evaluation and awaiting recommendations for intervention. Will defer at this time and follow up later as able.        Isaiah Todd, OT

## 2019-10-01 NOTE — PROGRESS NOTES
Problem: Falls - Risk of  Goal: *Absence of Falls  Description  Document Vonnie Beatty Fall Risk and appropriate interventions in the flowsheet.   Outcome: Progressing Towards Goal  Note:   Fall Risk Interventions:                                Problem: TIA/CVA Stroke: 0-24 hours  Goal: *Absence of deep venous thrombosis signs and symptoms(Stroke Metric)  Outcome: Progressing Towards Goal  Goal: *Stroke education started(Stroke Metric)  Outcome: Progressing Towards Goal  Goal: *Dysphagia screen performed(Stroke Metric)  Outcome: Progressing Towards Goal  Goal: *Rehab consulted(Stroke Metric)  Outcome: Progressing Towards Goal

## 2019-10-02 PROBLEM — I65.21: Status: ACTIVE | Noted: 2019-10-02

## 2019-10-02 LAB
ALBUMIN SERPL-MCNC: 2.6 G/DL (ref 3.5–5)
ALBUMIN/GLOB SERPL: 0.8 {RATIO} (ref 1.1–2.2)
ALP SERPL-CCNC: 98 U/L (ref 45–117)
ALT SERPL-CCNC: 11 U/L (ref 12–78)
ANION GAP SERPL CALC-SCNC: 12 MMOL/L (ref 5–15)
ANION GAP SERPL CALC-SCNC: 5 MMOL/L (ref 5–15)
APTT PPP: 39 SEC (ref 22.1–32)
APTT PPP: 46.8 SEC (ref 22.1–32)
APTT PPP: 48.4 SEC (ref 22.1–32)
ASPIRIN TEST, ASPIRN: 567 ARU
AST SERPL-CCNC: 18 U/L (ref 15–37)
BASOPHILS # BLD: 0 K/UL (ref 0–0.1)
BASOPHILS NFR BLD: 0 % (ref 0–1)
BILIRUB SERPL-MCNC: 0.7 MG/DL (ref 0.2–1)
BUN SERPL-MCNC: 5 MG/DL (ref 6–20)
BUN SERPL-MCNC: 7 MG/DL (ref 6–20)
BUN/CREAT SERPL: 11 (ref 12–20)
BUN/CREAT SERPL: 6 (ref 12–20)
CALCIUM SERPL-MCNC: 8.1 MG/DL (ref 8.5–10.1)
CALCIUM SERPL-MCNC: 8.7 MG/DL (ref 8.5–10.1)
CHLORIDE SERPL-SCNC: 92 MMOL/L (ref 97–108)
CHLORIDE SERPL-SCNC: 92 MMOL/L (ref 97–108)
CO2 SERPL-SCNC: 29 MMOL/L (ref 21–32)
CO2 SERPL-SCNC: 38 MMOL/L (ref 21–32)
CREAT SERPL-MCNC: 0.63 MG/DL (ref 0.7–1.3)
CREAT SERPL-MCNC: 0.79 MG/DL (ref 0.7–1.3)
DIFFERENTIAL METHOD BLD: ABNORMAL
EOSINOPHIL # BLD: 0.1 K/UL (ref 0–0.4)
EOSINOPHIL NFR BLD: 1 % (ref 0–7)
ERYTHROCYTE [DISTWIDTH] IN BLOOD BY AUTOMATED COUNT: 17.5 % (ref 11.5–14.5)
FACT VIII ACT/NOR PPP: 368 % (ref 80–200)
GLOBULIN SER CALC-MCNC: 3.1 G/DL (ref 2–4)
GLUCOSE SERPL-MCNC: 109 MG/DL (ref 65–100)
GLUCOSE SERPL-MCNC: 125 MG/DL (ref 65–100)
HCT VFR BLD AUTO: 24.4 % (ref 36.6–50.3)
HGB BLD-MCNC: 8.1 G/DL (ref 12.1–17)
IMM GRANULOCYTES # BLD AUTO: 0.2 K/UL (ref 0–0.04)
IMM GRANULOCYTES NFR BLD AUTO: 1 % (ref 0–0.5)
INR PPP: 1.2 (ref 0.9–1.1)
LYMPHOCYTES # BLD: 3.1 K/UL (ref 0.8–3.5)
LYMPHOCYTES NFR BLD: 26 % (ref 12–49)
MAGNESIUM SERPL-MCNC: 1.6 MG/DL (ref 1.6–2.4)
MCH RBC QN AUTO: 33.1 PG (ref 26–34)
MCHC RBC AUTO-ENTMCNC: 33.2 G/DL (ref 30–36.5)
MCV RBC AUTO: 99.6 FL (ref 80–99)
MONOCYTES # BLD: 0.9 K/UL (ref 0–1)
MONOCYTES NFR BLD: 7 % (ref 5–13)
NEUTS SEG # BLD: 7.6 K/UL (ref 1.8–8)
NEUTS SEG NFR BLD: 64 % (ref 32–75)
NRBC # BLD: 0 K/UL (ref 0–0.01)
NRBC BLD-RTO: 0 PER 100 WBC
PHOSPHATE SERPL-MCNC: 2.9 MG/DL (ref 2.6–4.7)
PLATELET # BLD AUTO: 210 K/UL (ref 150–400)
PMV BLD AUTO: 9.6 FL (ref 8.9–12.9)
POTASSIUM SERPL-SCNC: 2.2 MMOL/L (ref 3.5–5.1)
POTASSIUM SERPL-SCNC: 3 MMOL/L (ref 3.5–5.1)
PROT SERPL-MCNC: 5.7 G/DL (ref 6.4–8.2)
PROTHROMBIN TIME: 11.6 SEC (ref 9–11.1)
RBC # BLD AUTO: 2.45 M/UL (ref 4.1–5.7)
SODIUM SERPL-SCNC: 133 MMOL/L (ref 136–145)
SODIUM SERPL-SCNC: 135 MMOL/L (ref 136–145)
THERAPEUTIC RANGE,PTTT: ABNORMAL SECS (ref 58–77)
URATE SERPL-MCNC: 6.5 MG/DL (ref 3.5–7.2)
WBC # BLD AUTO: 11.8 K/UL (ref 4.1–11.1)

## 2019-10-02 PROCEDURE — 84100 ASSAY OF PHOSPHORUS: CPT

## 2019-10-02 PROCEDURE — 74011250636 HC RX REV CODE- 250/636: Performed by: INTERNAL MEDICINE

## 2019-10-02 PROCEDURE — 74011250637 HC RX REV CODE- 250/637: Performed by: NURSE PRACTITIONER

## 2019-10-02 PROCEDURE — 83735 ASSAY OF MAGNESIUM: CPT

## 2019-10-02 PROCEDURE — 96366 THER/PROPH/DIAG IV INF ADDON: CPT

## 2019-10-02 PROCEDURE — 80053 COMPREHEN METABOLIC PANEL: CPT

## 2019-10-02 PROCEDURE — 81241 F5 GENE: CPT

## 2019-10-02 PROCEDURE — 65660000001 HC RM ICU INTERMED STEPDOWN

## 2019-10-02 PROCEDURE — 84550 ASSAY OF BLOOD/URIC ACID: CPT

## 2019-10-02 PROCEDURE — 85730 THROMBOPLASTIN TIME PARTIAL: CPT

## 2019-10-02 PROCEDURE — 81240 F2 GENE: CPT

## 2019-10-02 PROCEDURE — 92507 TX SP LANG VOICE COMM INDIV: CPT

## 2019-10-02 PROCEDURE — 85302 CLOT INHIBIT PROT C ANTIGEN: CPT

## 2019-10-02 PROCEDURE — 74011250636 HC RX REV CODE- 250/636: Performed by: RADIOLOGY

## 2019-10-02 PROCEDURE — 85576 BLOOD PLATELET AGGREGATION: CPT

## 2019-10-02 PROCEDURE — 85300 ANTITHROMBIN III ACTIVITY: CPT

## 2019-10-02 PROCEDURE — 85240 CLOT FACTOR VIII AHG 1 STAGE: CPT

## 2019-10-02 PROCEDURE — 85610 PROTHROMBIN TIME: CPT

## 2019-10-02 PROCEDURE — 74011250637 HC RX REV CODE- 250/637: Performed by: PSYCHIATRY & NEUROLOGY

## 2019-10-02 PROCEDURE — 36415 COLL VENOUS BLD VENIPUNCTURE: CPT

## 2019-10-02 PROCEDURE — 99218 HC RM OBSERVATION: CPT

## 2019-10-02 PROCEDURE — 96368 THER/DIAG CONCURRENT INF: CPT

## 2019-10-02 PROCEDURE — 85305 CLOT INHIBIT PROT S TOTAL: CPT

## 2019-10-02 PROCEDURE — 85025 COMPLETE CBC W/AUTO DIFF WBC: CPT

## 2019-10-02 PROCEDURE — 74011250636 HC RX REV CODE- 250/636: Performed by: NURSE PRACTITIONER

## 2019-10-02 RX ORDER — SODIUM,POTASSIUM PHOSPHATES 280-250MG
2 POWDER IN PACKET (EA) ORAL 4 TIMES DAILY
Status: DISCONTINUED | OUTPATIENT
Start: 2019-10-02 | End: 2019-10-02

## 2019-10-02 RX ORDER — POTASSIUM CHLORIDE 750 MG/1
40 TABLET, FILM COATED, EXTENDED RELEASE ORAL
Status: COMPLETED | OUTPATIENT
Start: 2019-10-02 | End: 2019-10-02

## 2019-10-02 RX ORDER — POTASSIUM CHLORIDE 750 MG/1
40 TABLET, FILM COATED, EXTENDED RELEASE ORAL
Status: DISCONTINUED | OUTPATIENT
Start: 2019-10-02 | End: 2019-10-02

## 2019-10-02 RX ORDER — HEPARIN SODIUM 5000 [USP'U]/ML
4000 INJECTION, SOLUTION INTRAVENOUS; SUBCUTANEOUS ONCE
Status: COMPLETED | OUTPATIENT
Start: 2019-10-02 | End: 2019-10-02

## 2019-10-02 RX ORDER — POTASSIUM CHLORIDE 7.45 MG/ML
10 INJECTION INTRAVENOUS
Status: COMPLETED | OUTPATIENT
Start: 2019-10-02 | End: 2019-10-02

## 2019-10-02 RX ORDER — LANOLIN ALCOHOL/MO/W.PET/CERES
400 CREAM (GRAM) TOPICAL 2 TIMES DAILY
Status: COMPLETED | OUTPATIENT
Start: 2019-10-02 | End: 2019-10-02

## 2019-10-02 RX ORDER — GUAIFENESIN 100 MG/5ML
162 LIQUID (ML) ORAL ONCE
Status: COMPLETED | OUTPATIENT
Start: 2019-10-02 | End: 2019-10-02

## 2019-10-02 RX ORDER — COLCHICINE 0.6 MG/1
1.2 TABLET ORAL ONCE
Status: COMPLETED | OUTPATIENT
Start: 2019-10-02 | End: 2019-10-02

## 2019-10-02 RX ORDER — HEPARIN SODIUM 1000 [USP'U]/ML
2000 INJECTION, SOLUTION INTRAVENOUS; SUBCUTANEOUS ONCE
Status: COMPLETED | OUTPATIENT
Start: 2019-10-02 | End: 2019-10-02

## 2019-10-02 RX ORDER — MAGNESIUM SULFATE 1 G/100ML
1 INJECTION INTRAVENOUS ONCE
Status: COMPLETED | OUTPATIENT
Start: 2019-10-02 | End: 2019-10-02

## 2019-10-02 RX ORDER — HEPARIN SODIUM 5000 [USP'U]/ML
2000 INJECTION, SOLUTION INTRAVENOUS; SUBCUTANEOUS ONCE
Status: COMPLETED | OUTPATIENT
Start: 2019-10-02 | End: 2019-10-02

## 2019-10-02 RX ORDER — GUAIFENESIN 100 MG/5ML
81 LIQUID (ML) ORAL DAILY
Status: DISCONTINUED | OUTPATIENT
Start: 2019-10-03 | End: 2019-10-17 | Stop reason: HOSPADM

## 2019-10-02 RX ADMIN — MAGNESIUM SULFATE HEPTAHYDRATE 1 G: 1 INJECTION, SOLUTION INTRAVENOUS at 12:18

## 2019-10-02 RX ADMIN — HEPARIN SODIUM AND DEXTROSE 24 UNITS/KG/HR: 10000; 5 INJECTION INTRAVENOUS at 21:02

## 2019-10-02 RX ADMIN — ATORVASTATIN CALCIUM 80 MG: 40 TABLET, FILM COATED ORAL at 09:14

## 2019-10-02 RX ADMIN — POTASSIUM CHLORIDE 10 MEQ: 10 INJECTION, SOLUTION INTRAVENOUS at 05:55

## 2019-10-02 RX ADMIN — ASPIRIN 81 MG CHEWABLE TABLET 162 MG: 81 TABLET CHEWABLE at 09:14

## 2019-10-02 RX ADMIN — HEPARIN SODIUM 4000 UNITS: 5000 INJECTION INTRAVENOUS; SUBCUTANEOUS at 03:09

## 2019-10-02 RX ADMIN — COLCHICINE 1.2 MG: 0.6 TABLET, FILM COATED ORAL at 16:00

## 2019-10-02 RX ADMIN — HEPARIN SODIUM 2000 UNITS: 5000 INJECTION INTRAVENOUS; SUBCUTANEOUS at 19:06

## 2019-10-02 RX ADMIN — POTASSIUM CHLORIDE 10 MEQ: 10 INJECTION, SOLUTION INTRAVENOUS at 04:28

## 2019-10-02 RX ADMIN — HEPARIN SODIUM 2000 UNITS: 1000 INJECTION INTRAVENOUS; SUBCUTANEOUS at 11:28

## 2019-10-02 RX ADMIN — MAGNESIUM OXIDE TAB 400 MG (241.3 MG ELEMENTAL MG) 400 MG: 400 (241.3 MG) TAB at 09:14

## 2019-10-02 RX ADMIN — POTASSIUM CHLORIDE 10 MEQ: 10 INJECTION, SOLUTION INTRAVENOUS at 09:19

## 2019-10-02 RX ADMIN — POTASSIUM CHLORIDE 40 MEQ: 750 TABLET, FILM COATED, EXTENDED RELEASE ORAL at 19:23

## 2019-10-02 RX ADMIN — MAGNESIUM OXIDE TAB 400 MG (241.3 MG ELEMENTAL MG) 400 MG: 400 (241.3 MG) TAB at 17:19

## 2019-10-02 RX ADMIN — HEPARIN SODIUM AND DEXTROSE 20 UNITS/KG/HR: 10000; 5 INJECTION INTRAVENOUS at 07:04

## 2019-10-02 RX ADMIN — POTASSIUM CHLORIDE 10 MEQ: 10 INJECTION, SOLUTION INTRAVENOUS at 07:03

## 2019-10-02 NOTE — PROGRESS NOTES
Bedside and Verbal shift change report given to Dajuan RN (oncoming nurse) by Jose Ramon Guardado RN (offgoing nurse). Report included the following information SBAR, Kardex, Intake/Output and MAR.

## 2019-10-02 NOTE — PROGRESS NOTES
Physical Therapy Note    Chart reviewed. Patient started on heparin aPTT currently 46.8. Per protocol, will work with patient when aPTT is therapeutic (58-92).      Thank you,  Yoly BELTRANT

## 2019-10-02 NOTE — PROGRESS NOTES
Neurointerventional Surgery Progress Note  Beto Dorsey NP  Cell:           Admit Date: 9/30/2019        Daily Progress Note: 10/2/2019   LOS: 0 days        Interval History/Subjective:     Patient with no acute events overnight. Patient still remains subtherapeutic on heparin gtt. Neuro exam is unchanged. Subjectively patient feels that sensation changes in left hand are improved. Hypercoagulable screening panel ordered for today. Severe hypokalemia, repleted, recheck this afternoon. Acute gout flare noted to right ankle today, uric acid level check and colchicine ordered. Currently he denies headache, numbness/tingling, nausea/vomiting, speech or visual changes, gait imbalance. He endorses weakness to the left arm and hand stating that his hand feels \"heavy\" and \"dead\". He also endorses right ankle pain with palpation and ambulation. Assessment & Plan:       Principal Problem:    Carotid artery thrombosis, right (10/1/2019)    Active Problems:    Left hand weakness (9/30/2019)      Stroke due to embolism [facility]) (10/1/2019)      Carotid artery embolus, right (10/2/2019)    This is a 60year old male with delayed presentation to the hospital with left hand weakness. He was found to have scattered small acute cortical/subcortical infarcts throughout the right frontal, parietal and occipital lobes. CTA revealed large wall adherent thrombus in the distal right common carotid artery extending to the carotid bifurcation which is causing moderate stenosis. We are consulted for these reasons.      Plan:   - continue on heparin gtt/aspirin for right CCA thrombus , continue to titrate heparin until therapeutic   - continue intermediate level of care   - hypercoagulable workup started, neurology/hospitalist to follow up   - one time dose 1.2 mg colchicine given for acute gout flare  - potassium repleted- hospitalist to follow up   - aspirin remains subtherapeutic, recheck this afternoon   - recommend CTA Head/Neck in 24-48 hours from today     We will sign off the case at this time. Please reconsult should you have any further concerns or questions. Plan d/w Dr. Serenity Toussaint, Dr. Farhana Prado RN, and patient      Admission Summary:     Jennifer Ray is a 60 y.o. male is a right handed dominant male with a past medical history significant for bilateral lower extremity DVT, PE on Coumadin, history of kidney stones, alcohol and tobacco abuse. He presented to [facility] with left hand weakness around 7 am on 9/30/19. He found that he was unable to use his hand to operate a vehicle and start ignition. He noticed that his walking was not normal about two weeks ago. 2 days ago his hand became poorly coordinated. MRI on presentation showed scattered right sided infarcts and CTA shows a large mural thrombus within the right carotid artery causing moderate stenosis with risk for embolization. He has been on coumadin since being diagnosed with DVT/PE in August of this year however his INR on arrival is subtherapeutic at 1.2. He cannot remember when he was started on Coumadin ( per medical records it was in August) and admits that he may not have been taking daily as prescribed.      Current Facility-Administered Medications   Medication Dose Route Frequency Provider Last Rate Last Dose    [START ON 10/3/2019] aspirin chewable tablet 81 mg  81 mg Oral DAILY Talisha Plata NP        magnesium oxide (MAG-OX) tablet 400 mg  400 mg Oral BID Talisha Plata NP   400 mg at 10/02/19 5915    colchicine tablet 1.2 mg  1.2 mg Oral ONCE Geovanna Murrell NP        atorvastatin (LIPITOR) tablet 80 mg  80 mg Oral DAILY Sara Delarosa K DO   80 mg at 10/02/19 0914    heparin 25,000 units in D5W 250 ml infusion  12-25 Units/kg/hr IntraVENous TITRATE Geovanna Murrell NP 18.3 mL/hr at 10/02/19 1129 22 Units/kg/hr at 10/02/19 1129    acetaminophen (TYLENOL) tablet 650 mg  650 mg Oral Q4H PRN Jennfier Gutierrez MD Or    acetaminophen (TYLENOL) solution 650 mg  650 mg Per NG tube Q4H PRN Kvng Connors MD        Or    acetaminophen (TYLENOL) suppository 650 mg  650 mg Rectal Q4H PRN Kvng Connors MD            Allergies   Allergen Reactions    Eggplant Anaphylaxis    Pcn [Penicillins] Anaphylaxis and Swelling    Codeine Rash, Nausea and Vomiting and Swelling       Review of Systems:  A comprehensive review of systems was negative except for that written in the History of Present Illness. Objective:     Vital signs  Temp (24hrs), Av.4 °F (36.9 °C), Min:98 °F (36.7 °C), Max:98.8 °F (37.1 °C)   10/02 0701 - 10/02 1900  In: 800 [P.O.:500; I.V.:300]  Out: 620 [Urine:620]  1901 - 10/02 0700  In: 120 [P.O.:120]  Out: 1165 [Urine:1165]    Visit Vitals  /63 (BP 1 Location: Right arm, BP Patient Position: At rest)   Pulse 66   Temp 98 °F (36.7 °C)   Resp 20   Ht 5' 10\" (1.778 m)   Wt 186 lb 14.4 oz (84.8 kg)   SpO2 98%   BMI 26.82 kg/m²      O2 Device: Room air   Vitals:    10/02/19 0545 10/02/19 1000 10/02/19 1010 10/02/19 1415   BP: 103/67  101/62 116/63   Pulse: 60  67 66   Resp: 15  16 20   Temp: 98.2 °F (36.8 °C)  98.3 °F (36.8 °C) 98 °F (36.7 °C)   SpO2:  95% 95% 98%   Weight:       Height:            GENERAL: alert, cooperative, no distress, appears stated age  LUNG: clear to auscultation bilaterally  HEART: regular rate and rhythm, S1, S2 normal, no murmur, click, rub or gallop  ABDOMEN: soft, non-tender. Bowel sounds normal. No masses,  no organomegaly  SKIN: Right ankle swollen and hot to touch.      Neurologic Exam:  Mental Status:             Alert and oriented x 4. Appropriate affect, mood and behavior.        Language:                   Normal fluency, repetition, comprehension and naming.     Cranial Nerves:           Normal appearing fundi, sharp discs. Pupils equal, round and reactive to light. Visual fields full to confrontation. Extraocular movements intact                                      Facial sensation intact V1 - V3. Full facial strength, no asymmetry. Hearing intact bilaterally. No dysarthria. Tongue protrudes to midline, palate elevates symmetrically. Shoulder shrug 5/5 bilaterally.     Motor:                          No pronator drift. Bulk and tone normal.                                       5/5 power in all right sided extremities proximally and distally. 4/5 power in left arm and hand , 5/5 power in left leg                                       No involuntary movements.     Sensation:                   Sensation intact throughout to light touch, temperature                         Coordination & Gait:   Deferred. FTN intact. Imaging:    CTA Head/Neck 9/30/2019:      IMPRESSION:      CTA Head:  1. No evidence of significant stenosis or aneurysm.     CTA Neck:  1. Large wall adherent fibrofatty plaque/thrombus in the distal right common  carotid artery extending to the bifurcation, with short segment extension of  intraluminal thrombus into the proximal right internal carotid artery. There is  overall moderate stenosis of the distal right common carotid artery and mild  stenosis of the proximal right internal carotid artery. 2. Long segment extension of intraluminal thrombus into the proximal and mid  right external carotid artery with moderate stenosis.     MRI Brain WO Contrast 9/30/2019:     IMPRESSION:      1. Scattered small acute cortical/subcortical infarcts throughout the right  frontal, parietal, and occipital lobes.     24 hour results:    Recent Results (from the past 24 hour(s))   PTT    Collection Time: 10/01/19 5:00 PM   Result Value Ref Range    aPTT 24.5 22.1 - 32.0 sec    aPTT, therapeutic range     58.0 - 77.0 SECS   PROTHROMBIN TIME + INR    Collection Time: 10/02/19  2:00 AM   Result Value Ref Range    INR 1.2 (H) 0.9 - 1.1      Prothrombin time 11.6 (H) 9.0 - 11.1 sec   PTT    Collection Time: 10/02/19  2:00 AM   Result Value Ref Range    aPTT 39.0 (H) 22.1 - 32.0 sec    aPTT, therapeutic range     58.0 - 77.0 SECS   CBC WITH AUTOMATED DIFF    Collection Time: 10/02/19  2:00 AM   Result Value Ref Range    WBC 11.8 (H) 4.1 - 11.1 K/uL    RBC 2.45 (L) 4.10 - 5.70 M/uL    HGB 8.1 (L) 12.1 - 17.0 g/dL    HCT 24.4 (L) 36.6 - 50.3 %    MCV 99.6 (H) 80.0 - 99.0 FL    MCH 33.1 26.0 - 34.0 PG    MCHC 33.2 30.0 - 36.5 g/dL    RDW 17.5 (H) 11.5 - 14.5 %    PLATELET 883 207 - 403 K/uL    MPV 9.6 8.9 - 12.9 FL    NRBC 0.0 0  WBC    ABSOLUTE NRBC 0.00 0.00 - 0.01 K/uL    NEUTROPHILS 64 32 - 75 %    LYMPHOCYTES 26 12 - 49 %    MONOCYTES 7 5 - 13 %    EOSINOPHILS 1 0 - 7 %    BASOPHILS 0 0 - 1 %    IMMATURE GRANULOCYTES 1 (H) 0.0 - 0.5 %    ABS. NEUTROPHILS 7.6 1.8 - 8.0 K/UL    ABS. LYMPHOCYTES 3.1 0.8 - 3.5 K/UL    ABS. MONOCYTES 0.9 0.0 - 1.0 K/UL    ABS. EOSINOPHILS 0.1 0.0 - 0.4 K/UL    ABS. BASOPHILS 0.0 0.0 - 0.1 K/UL    ABS. IMM. GRANS. 0.2 (H) 0.00 - 0.04 K/UL    DF AUTOMATED     METABOLIC PANEL, COMPREHENSIVE    Collection Time: 10/02/19  2:00 AM   Result Value Ref Range    Sodium 135 (L) 136 - 145 mmol/L    Potassium 2.2 (LL) 3.5 - 5.1 mmol/L    Chloride 92 (L) 97 - 108 mmol/L    CO2 38 (H) 21 - 32 mmol/L    Anion gap 5 5 - 15 mmol/L    Glucose 109 (H) 65 - 100 mg/dL    BUN 7 6 - 20 MG/DL    Creatinine 0.63 (L) 0.70 - 1.30 MG/DL    BUN/Creatinine ratio 11 (L) 12 - 20      GFR est AA >60 >60 ml/min/1.73m2    GFR est non-AA >60 >60 ml/min/1.73m2    Calcium 8.1 (L) 8.5 - 10.1 MG/DL    Bilirubin, total 0.7 0.2 - 1.0 MG/DL    ALT (SGPT) 11 (L) 12 - 78 U/L    AST (SGOT) 18 15 - 37 U/L    Alk.  phosphatase 98 45 - 117 U/L Protein, total 5.7 (L) 6.4 - 8.2 g/dL    Albumin 2.6 (L) 3.5 - 5.0 g/dL    Globulin 3.1 2.0 - 4.0 g/dL    A-G Ratio 0.8 (L) 1.1 - 2.2     MAGNESIUM    Collection Time: 10/02/19  2:00 AM   Result Value Ref Range    Magnesium 1.6 1.6 - 2.4 mg/dL   PHOSPHORUS    Collection Time: 10/02/19  2:00 AM   Result Value Ref Range    Phosphorus 2.9 2.6 - 4.7 MG/DL   ASPIRIN TEST    Collection Time: 10/02/19  4:42 AM   Result Value Ref Range    Aspirin test 567 ARU   PTT    Collection Time: 10/02/19  9:30 AM   Result Value Ref Range    aPTT 46.8 (H) 22.1 - 32.0 sec    aPTT, therapeutic range     58.0 - 77.0 SECS        Wale Gamboa, NP

## 2019-10-02 NOTE — PROGRESS NOTES
Occupational Therapy: hold    Chart reviewed, noted recent history of bilateral large PE and bilateral lower extremity DVT on 08/08/2019. Augustus Carr is on heparin with latest aPTT of  46.8. Per protocol, will work with patient when aPTT is within therapeutic range (58-92).     Alex Medina OTR/L

## 2019-10-02 NOTE — PROGRESS NOTES
Problem: Communication Impaired (Adult)  Goal: *Acute Goals and Plan of Care (Insert Text)  Description  Speech pathology goals  Initiated 10/1/2019  1. Patient will achieve 90% intelligibility with tongue twisters given no cues within 7 days. MET  2. Patient will answer complex yes/no questions with 90% accuracy given no cues within 7 days. MET   Outcome: Resolved/Met     SPEECH LANGUAGE PATHOLOGY TREATMENT/DISCHARGE  Patient: Dangelo Rey (89 y.o. male)  Date: 10/2/2019  Diagnosis: Left hand weakness [R29.898]  Carotid artery embolus, right [I65.21] Carotid artery thrombosis, right       Precautions:       ASSESSMENT:  Patient with intact comprehension/processing, and patient 100% intelligible both in conversation and with tongue twisters. Provided re-education regarding motor speech compensatory strategies and patient verbalized understanding. PLAN:  Patient will be discharged from skilled acute speech therapy at this time. Rationale for discharge:  ?    Goals Achieved  ? Plateau Reached  ? Patient not participating in therapy  ? Other:  Discharge Recommendations:  None     SUBJECTIVE:   Patient stated I try to keep my mind active.     OBJECTIVE:   Mental Status:  Neurologic State: Alert, Eyes open spontaneously  Orientation Level: Oriented X4  Cognition: Appropriate decision making, Appropriate for age attention/concentration, Appropriate safety awareness           Treatment & Interventions: Motor Speech:     Intelligibility: No impairment        Sentence Intelligibility (%): 100 %(tongue twisters)                    Language Comprehension and Expression:  Auditory Comprehension   Auditory Impairment: No   Response to Complex Yes/No Questions (%) : 100 %  Verbal Expression                 NOMS:   The NOMS functional outcome measure was used to quantify this patient's level of motor speech impairment. Based on the NOMS, the patient was determined to be at level 7 for motor speech function. NOMS Motor Speech:  Level 1 (CN):  100% unintelligible  Level 2 (CM):  Communication partner responsible for message; can do CV or automatic words w/ max cues but rarely intelligible in context  Level 3 (CL): communication partner primarily responsible for message but says CV/automatic words intelligibly; mod cues to say simple words/phrases  Level 4 (CK): In structured conversation with familiar listener can say simple words and phrases. Mod cues for simple sentences  Level 5 (CJ):  Uses simple sentences for ADLs with familiar and unfamiliar listener; min cues for complex sentences  Level 6 (CI):  Intelligible in ADLs; difficulty in voc/social activites; rare cues for complex message; uses comp strategies  Level 7 ([address] [place] [address]):  Intelligible in all activities. May occasionally use compensatory strategies. DEANNE. (2003). National Outcomes Measurement System (NOMS): Adult Speech-Language Pathology User's Guide. Response & Tolerance to Activities:     Pain:  Pain Scale 1: Numeric (0 - 10)  Pain Intensity 1: 0     After treatment:   ?    Patient left in no apparent distress sitting up in chair  ? Patient left in no apparent distress in bed  ? Call bell left within reach  ? Nursing notified  ? Caregiver present  ? Bed alarm activated    COMMUNICATION/COLLABORATION:   Patient was educated regarding His deficit(s) of dysarthria as this relates to His diagnosis of CVA. He demonstrated Good understanding as evidenced by verbalizing understanding.     The patients plan of care was discussed with: Registered Nurse    Trish Ellison SLP  Time Calculation: 13 mins

## 2019-10-02 NOTE — PROGRESS NOTES
Transition of Care Plan:           -Patient is likely to discharge home when medically stable.  -Awaiting therapy recommendations   -Need PCP appointment at [address] [place] [address] to establish PCP provider. Pt is a 59 yo male who presents to [facility] with weakness; Past medical history of significant for bilateral lower extremity DVT and bilateral PE, kidney stones and alcohol/ tobacco abuse. Pt lives with spouse in a one level home. Pt is currently uninsured and unemployed. Pt is independent with no DME. Pt has limited support outside of his spouse. Pt is agreeable to receive services at [address] [place] [address] upon discharge. E-mail CM specialist to request initial PCP appointment. Medassist Rep assessed for Medicaid benefits  (See note). Reason for Admission:   Left hand weakness                    RRAT Score:          9           Plan for utilizing home health: Therapy to evaluate and Awaiting recommendations. Current Advanced Directive/Advance Care Plan: Not on file                         CM to follow.  CAROL Martin,CRM

## 2019-10-02 NOTE — PROGRESS NOTES
Spiritual Care Assessment/Progress Note  [facility]      NAME: Francesca Sheffield      MRN: [id number]  AGE: 60 y.o.  SEX: male  Oriental orthodox Affiliation: Jain   Language: English     10/2/2019     Total Time (in minutes): 5     Spiritual Assessment begun in [place] through conversation with:         [x]Patient        [] Family    [] Friend(s)        Reason for Consult: [facility]     Spiritual beliefs: (Please include comment if needed)     [x] Identifies with a jose luis tradition:  Jain       [x] Supported by a jose luis community: Addis Suresh           [] Claims no spiritual orientation:           [] Seeking spiritual identity:                [] Adheres to an individual form of spirituality:           [] Not able to assess:                           Identified resources for coping:      [x] Prayer                               [x] Music                  [] Guided Imagery     [x] Family/friends                 [] Pet visits     [] Devotional reading                         [] Unknown     [] Other:                                               Interventions offered during this visit: (See comments for more details)    Patient Interventions: Affirmation of emotions/emotional suffering, Affirmation of jose luis, Communion (Jain), Coordination with community clergy, Initial/Spiritual assessment, patient floor, Prayer (actual), Prayer (assurance of)           Plan of Care:     [x] Support spiritual and/or cultural needs    [] Support AMD and/or advance care planning process      [] Support grieving process   [] Coordinate Rites and/or Rituals    [x] Coordination with community clergy   [] No spiritual needs identified at this time   [] Detailed Plan of Care below (See Comments)  [] Make referral to Music Therapy  [] Make referral to Pet Therapy     [] Make referral to Addiction services  [] Make referral to [facility]  [] Make referral to Spiritual Care Partner  [] No future visits requested        [] Follow up visits as needed     Comments: [address] visit. Mr. Johanny Barker visited by [employer]. Requested to be seen by Fr. Malhotra. Referral made. Alex offered.     GURWINDER Croft, RN, ACSW, LCSW   Page:  [phone]

## 2019-10-02 NOTE — PROGRESS NOTES
Bedside shift change report given to Tay (oncoming nurse) by Darlene (offgoing nurse). Report included the following information SBAR, Kardex, ED Summary, MAR, Accordion, Recent Results and Cardiac Rhythm NSR.

## 2019-10-02 NOTE — PROGRESS NOTES
Problem: Pain  Goal: *Control of Pain  Outcome: Progressing Towards Goal     Problem: Falls - Risk of  Goal: *Absence of Falls  Description  Document Deanna Reardon Fall Risk and appropriate interventions in the flowsheet.   Outcome: Progressing Towards Goal  Note:   Fall Risk Interventions:  Mobility Interventions: Patient to call before getting OOB, Communicate number of staff needed for ambulation/transfer         Medication Interventions: Patient to call before getting OOB, Teach patient to arise slowly, Evaluate medications/consider consulting pharmacy    Elimination Interventions: Call light in reach, Toileting schedule/hourly rounds, Patient to call for help with toileting needs

## 2019-10-02 NOTE — PROGRESS NOTES
Problem: Pain  Goal: *Control of Pain  Outcome: Progressing Towards Goal     Problem: Falls - Risk of  Goal: *Absence of Falls  Description  Document Shanelle Thomas Fall Risk and appropriate interventions in the flowsheet. Outcome: Progressing Towards Goal  Note:   Fall Risk Interventions:  Mobility Interventions: Utilize walker, cane, or other assistive device, Utilize gait belt for transfers/ambulation, OT consult for ADLs, Patient to call before getting OOB, Bed/chair exit alarm         Medication Interventions: Assess postural VS orthostatic hypotension, Bed/chair exit alarm, Patient to call before getting OOB, Teach patient to arise slowly, Utilize gait belt for transfers/ambulation    Elimination Interventions: Toileting schedule/hourly rounds, Toilet paper/wipes in reach, Patient to call for help with toileting needs, Bed/chair exit alarm              Problem: Pressure Injury - Risk of  Goal: *Prevention of pressure injury  Description  Document Capo Scale and appropriate interventions in the flowsheet.   Outcome: Progressing Towards Goal  Note:   Pressure Injury Interventions:  Sensory Interventions: Assess changes in LOC, Check visual cues for pain, Discuss PT/OT consult with provider         Activity Interventions: Increase time out of bed, Pressure redistribution bed/mattress(bed type), PT/OT evaluation         Nutrition Interventions: Document food/fluid/supplement intake, Offer support with meals,snacks and hydration

## 2019-10-02 NOTE — PROGRESS NOTES
Problem: Pain  Goal: *Control of Pain  Outcome: Progressing Towards Goal     Problem: Falls - Risk of  Goal: *Absence of Falls  Description  Document Patrice Villarealnce Fall Risk and appropriate interventions in the flowsheet. Outcome: Progressing Towards Goal  Note:   Fall Risk Interventions:  Mobility Interventions: Utilize walker, cane, or other assistive device, Utilize gait belt for transfers/ambulation, OT consult for ADLs, Patient to call before getting OOB, Bed/chair exit alarm         Medication Interventions: Assess postural VS orthostatic hypotension, Bed/chair exit alarm, Patient to call before getting OOB, Teach patient to arise slowly, Utilize gait belt for transfers/ambulation    Elimination Interventions:  Toileting schedule/hourly rounds, Toilet paper/wipes in reach, Patient to call for help with toileting needs, Bed/chair exit alarm              Problem: TIA/CVA Stroke: 0-24 hours  Goal: *Absence of Signs of Aspiration on Current Diet  Outcome: Progressing Towards Goal  Goal: *Absence of deep venous thrombosis signs and symptoms(Stroke Metric)  Outcome: Progressing Towards Goal  Goal: *Ability to perform ADLs and demonstrates progressive mobility and function  Outcome: Progressing Towards Goal  Goal: *Stroke education started(Stroke Metric)  Outcome: Progressing Towards Goal

## 2019-10-02 NOTE — ROUTINE PROCESS
Bedside and Verbal shift change report given to B (oncoming nurse) by Mary Lou Whiet RN offgoing nurse). Report included the following information SBAR, Kardex, ED Summary, Procedure Summary, Intake/Output, MAR, Recent Results, Med Rec Status, Cardiac Rhythm NSR.  and Alarm Parameters .

## 2019-10-02 NOTE — CONSULTS
Neurology Progress Note    Patient ID:  Breonna Tejeda  [id number]  60 y.o.  1955    Chief Complaint: Stroke    Subjective:     72-year-old gentleman with PE/DVT with subtherapeutic INR presenting with progressive strokelike symptoms over a couple weeks. MRI showing symptomatic right carotid stenosis with infarct. He is now on a heparin drip. No acute changes. Left arm and hand are still weak but no worse. Objective:       ROS:  Per HPI  All other 12 pt ROS negative    Meds:  Current Facility-Administered Medications   Medication Dose Route Frequency    [START ON 10/3/2019] aspirin chewable tablet 81 mg  81 mg Oral DAILY    magnesium oxide (MAG-OX) tablet 400 mg  400 mg Oral BID    magnesium sulfate 1 g/100 ml IVPB (premix or compounded)  1 g IntraVENous ONCE    atorvastatin (LIPITOR) tablet 80 mg  80 mg Oral DAILY    heparin 25,000 units in D5W 250 ml infusion  12-25 Units/kg/hr IntraVENous TITRATE    acetaminophen (TYLENOL) tablet 650 mg  650 mg Oral Q4H PRN    Or    acetaminophen (TYLENOL) solution 650 mg  650 mg Per NG tube Q4H PRN    Or    acetaminophen (TYLENOL) suppository 650 mg  650 mg Rectal Q4H PRN       MRI Results (maximum last 3): Results from [place] encounter on 09/30/19   MRI BRAIN W WO CONT    Narrative EXAM:  MRI BRAIN W WO CONT    INDICATION:    left hand weakness    COMPARISON:  CT head 9/30/2019. CONTRAST: 10 ml [place]. TECHNIQUE:    Multiplanar multisequence acquisition without and with contrast of the brain. FINDINGS:  Scattered small acute cortical/subcortical infarcts throughout the right  frontal, parietal, and occipital lobes, predominantly in the frontoparietal  lobes. No evidence of acute hemorrhage. The ventricles are normal in size and position. There is no extra-axial fluid  collection or mass effect. There is no cerebellar tonsillar herniation. Expected  arterial flow-voids are present. No evidence of abnormal enhancement.     The paranasal sinuses, mastoid air cells, and middle ears are clear. The orbital  contents are within normal limits. No significant osseous or scalp lesions are  identified. Impression IMPRESSION:     1. Scattered small acute cortical/subcortical infarcts throughout the right  frontal, parietal, and occipital lobes. 23X           Lab Review   Recent Results (from the past 24 hour(s))   PTT    Collection Time: 10/01/19  5:00 PM   Result Value Ref Range    aPTT 24.5 22.1 - 32.0 sec    aPTT, therapeutic range     58.0 - 77.0 SECS   PROTHROMBIN TIME + INR    Collection Time: 10/02/19  2:00 AM   Result Value Ref Range    INR 1.2 (H) 0.9 - 1.1      Prothrombin time 11.6 (H) 9.0 - 11.1 sec   PTT    Collection Time: 10/02/19  2:00 AM   Result Value Ref Range    aPTT 39.0 (H) 22.1 - 32.0 sec    aPTT, therapeutic range     58.0 - 77.0 SECS   CBC WITH AUTOMATED DIFF    Collection Time: 10/02/19  2:00 AM   Result Value Ref Range    WBC 11.8 (H) 4.1 - 11.1 K/uL    RBC 2.45 (L) 4.10 - 5.70 M/uL    HGB 8.1 (L) 12.1 - 17.0 g/dL    HCT 24.4 (L) 36.6 - 50.3 %    MCV 99.6 (H) 80.0 - 99.0 FL    MCH 33.1 26.0 - 34.0 PG    MCHC 33.2 30.0 - 36.5 g/dL    RDW 17.5 (H) 11.5 - 14.5 %    PLATELET 122 714 - 808 K/uL    MPV 9.6 8.9 - 12.9 FL    NRBC 0.0 0  WBC    ABSOLUTE NRBC 0.00 0.00 - 0.01 K/uL    NEUTROPHILS 64 32 - 75 %    LYMPHOCYTES 26 12 - 49 %    MONOCYTES 7 5 - 13 %    EOSINOPHILS 1 0 - 7 %    BASOPHILS 0 0 - 1 %    IMMATURE GRANULOCYTES 1 (H) 0.0 - 0.5 %    ABS. NEUTROPHILS 7.6 1.8 - 8.0 K/UL    ABS. LYMPHOCYTES 3.1 0.8 - 3.5 K/UL    ABS. MONOCYTES 0.9 0.0 - 1.0 K/UL    ABS. EOSINOPHILS 0.1 0.0 - 0.4 K/UL    ABS. BASOPHILS 0.0 0.0 - 0.1 K/UL    ABS. IMM.  GRANS. 0.2 (H) 0.00 - 0.04 K/UL    DF AUTOMATED     METABOLIC PANEL, COMPREHENSIVE    Collection Time: 10/02/19  2:00 AM   Result Value Ref Range    Sodium 135 (L) 136 - 145 mmol/L    Potassium 2.2 (LL) 3.5 - 5.1 mmol/L    Chloride 92 (L) 97 - 108 mmol/L    CO2 38 (H) 21 - 32 mmol/L Anion gap 5 5 - 15 mmol/L    Glucose 109 (H) 65 - 100 mg/dL    BUN 7 6 - 20 MG/DL    Creatinine 0.63 (L) 0.70 - 1.30 MG/DL    BUN/Creatinine ratio 11 (L) 12 - 20      GFR est AA >60 >60 ml/min/1.73m2    GFR est non-AA >60 >60 ml/min/1.73m2    Calcium 8.1 (L) 8.5 - 10.1 MG/DL    Bilirubin, total 0.7 0.2 - 1.0 MG/DL    ALT (SGPT) 11 (L) 12 - 78 U/L    AST (SGOT) 18 15 - 37 U/L    Alk. phosphatase 98 45 - 117 U/L    Protein, total 5.7 (L) 6.4 - 8.2 g/dL    Albumin 2.6 (L) 3.5 - 5.0 g/dL    Globulin 3.1 2.0 - 4.0 g/dL    A-G Ratio 0.8 (L) 1.1 - 2.2     MAGNESIUM    Collection Time: 10/02/19  2:00 AM   Result Value Ref Range    Magnesium 1.6 1.6 - 2.4 mg/dL   PHOSPHORUS    Collection Time: 10/02/19  2:00 AM   Result Value Ref Range    Phosphorus 2.9 2.6 - 4.7 MG/DL   ASPIRIN TEST    Collection Time: 10/02/19  4:42 AM   Result Value Ref Range    Aspirin test 567 ARU   PTT    Collection Time: 10/02/19  9:30 AM   Result Value Ref Range    aPTT 46.8 (H) 22.1 - 32.0 sec    aPTT, therapeutic range     58.0 - 77.0 SECS       Additional comments:I reviewed the patient's new clinical lab test results. Patient Vitals for the past 8 hrs:   BP Temp Pulse Resp SpO2   10/02/19 1010 101/62 98.3 °F (36.8 °C) 67 16 95 %   10/02/19 1000     95 %   10/02/19 0545 103/67 98.2 °F (36.8 °C) 60 15        No intake/output data recorded. 09/30 1901 - 10/02 0700  In: 120 [P.O.:120]  Out: 4737 [Urine:1165]    Exam:  Visit Vitals  /62 (BP 1 Location: Right arm, BP Patient Position: At rest)   Pulse 67   Temp 98.3 °F (36.8 °C)   Resp 16   Ht 5' 10\" (1.778 m)   Wt 84.8 kg (186 lb 14.4 oz)   SpO2 95%   BMI 26.82 kg/m²     Adult gentleman in bed awake and alert who can give me good historical details.   Pupils are equal, gaze is conjugate, EOMI, visual fields full  Face is asymmetric to the left, tongue is midline speech is mildly dysarthric but no aphasia  Motor testing revealing a left-sided weakness 4+ arm and leg  Sensation grossly intact without neglect  No ataxia  Gait deferred     PROBLEM LIST:     Patient Active Problem List   Diagnosis Code    Idiopathic gout of multiple sites M10.09    Pulmonary emboli (HCC) I26.99    Tobacco abuse Z72.0    Alcohol use Z72.89    Chest pain, pleuritic R07.81    Left hand weakness R29.898    Carotid artery thrombosis, right I65.21    Stroke due to embolism (HCC) I63.9    Carotid artery embolus, right I65.21       Assessment/Plan:      60year-old gentleman with symptomatic right carotid disease resulting in infarcts. Appreciate assistance from neuro interventional.  Once his INR is between 23 agree with resuming Coumadin. He will need to follow-up with the service who had been managing his Coumadin for PE/DVT. Continue aspirin 81 mg.  Rehab needs  During this evaluation, we also discussed stroke education to include signs and symptoms of stroke and TIA. This clinical note was dictated with an electronic dictation software that can make unintentional errors. If there are any questions, please contact me directly for clarification.       Signed:  Aftab Cheatham DO  10/2/2019  12:41 PM

## 2019-10-02 NOTE — PROGRESS NOTES
Hospitalist Progress Note  Augustine Moser MD  Answering service: [phone] OR 36 from in house phone        Date of Service:  10/2/2019  NAME:  Jacobo Moura  :  1955  MRN:  [id number]      Admission Summary: This is a 72-year-old  male with past medical history significant for bilateral lower extremity DVT and bilateral PE, on Coumadin, history of kidney stones, history of alcohol abuse and tobacco abuse, presented to [facility] with left hand weakness that started this morning quarter to 07:00 a.m. He stated that he had left hand weakness around 04:00 p.m. yesterday, and the weakness lasted for 3 hours and went away. This morning quarter to 07:00 a.m., he said he felt like someone put pressure on his left arm. He could not make a fist and decided to come to [facility]. No headache, fever, chills, sweating, left-sided chest pain, palpitation, shortness of breath, cough, abdominal pain, urinary complaint, or lower extremity weakness or swelling. No abnormal bowel movement. He took his Coumadin at 6 a.m. this morning. Interval history / Subjective:   Patient seen and evaluated. He was admitted for weakness left hand weakness. CTA showing significant right carotid artery disease. He is now on heparin drip. Neurointervention consulted. Patient says that the weakness is only slightly better ,still present. .No major event being reported by nurse. Assessment & Plan: 1. Symptomatic right carotid thrombus with subsequent small infarcts    -Pt presented with left hand weakness and CTA showed large wall adherent fibrofatty plaque/thrombus in the distal right common carotid artery extending to the bifurcation.  -Neurointervention consulted. Pt is on heparin drip. Will continue to follow recommendations.  -On statin. -ECO c/w EF 56 - 60%.  No regional wall motion abnormality noted.  -Educated about stroke  -Discussed with neurology this morning. Pt will continue on heparin drip for now,then transitions to PO AC based on neurointervention recommendations. Then will require physical therapy.  -Coagulation work tests ordered by neuro     2. Hypokalemia. -Replacing.  -Mg 1. 6. Ordered mag 1 gm iv today  -Repeat lab in am    3. Hypophosphatemia.  -On Neutra-Phos. 4.Leukocytosis, unclear etiology.   -The patient is afebrile, no evidence of infection. 5.History of bilateral large PE and bilateral lower extremity DVT, on 08/08/2019.    -Pt took Coumadin on outpatient. Difficult to tell if he was compliant being an alcohol and not knowing when the last time he took it. INR on admission 1. 4. Now on heparin    6. Tobacco abuse.    -The patient is advised to stop smoking. 7.H/o alcohol abuse  -Thiamine,folic acid po     Code status:full code  DVT prophylaxis:on heparin  Care Plan discussed with: Patient/Family  Disposition: TBD     Hospital Problems  Date Reviewed: 8/8/2018          Codes Class Noted POA    * (Principal) Carotid artery thrombosis, right ICD-10-CM: I65.21  ICD-9-CM: 433.10  10/1/2019 Unknown        Stroke due to embolism [facility]) ICD-10-CM: I63.9  ICD-9-CM: 434.11  10/1/2019 Unknown        Left hand weakness ICD-10-CM: R29.898  ICD-9-CM: 728.87  9/30/2019 Unknown                Review of Systems:   A comprehensive review of systems was negative except for that written in the HPI. Vital Signs:    Last 24hrs VS reviewed since prior progress note.  Most recent are:  Visit Vitals  /62 (BP 1 Location: Right arm, BP Patient Position: At rest)   Pulse 67   Temp 98.3 °F (36.8 °C)   Resp 16   Ht 5' 10\" (1.778 m)   Wt 84.8 kg (186 lb 14.4 oz)   SpO2 95%   BMI 26.82 kg/m²         Intake/Output Summary (Last 24 hours) at 10/2/2019 1149  Last data filed at 10/2/2019 0530  Gross per 24 hour   Intake 120 ml   Output 1165 ml   Net -1045 ml        Physical Examination: Constitutional:  No acute distress, cooperative, pleasant    ENT:  Oral mucous moist, oropharynx benign. Resp:  CTA bilaterally. No wheezing/rhonchi/rales. No accessory muscle use   CV:  Regular rhythm, normal rate, no murmurs, gallops, rubs    GI:  Soft, non distended, non tender. normoactive bowel sounds, no hepatosplenomegaly     Musculoskeletal:  No edema, warm, 2+ pulses throughout    Neurologic:  Moves all extremities. However has weakness left arm. AAOx3. Psych:  Good insight, Not anxious nor agitated. Data Review:    Review and/or order of clinical lab test      Labs:     Recent Labs     10/02/19  0200 09/30/19  1045   WBC 11.8* 14.2*   HGB 8.1* 10.7*   HCT 24.4* 30.9*    274     Recent Labs     10/02/19  0200 09/30/19  1017 09/30/19  1015   *  --  132*   K 2.2*  --  2.3*   CL 92*  --  86*   CO2 38*  --  35*   BUN 7  --  8   CREA 0.63*  --  0.95   *  --  120*   CA 8.1*  --  9.1   MG 1.6 1.6  --    PHOS 2.9 2.0*  --      Recent Labs     10/02/19  0200 09/30/19  1015   SGOT 18 22   ALT 11* 15   AP 98 134*   TBILI 0.7 1.0   TP 5.7* 7.4   ALB 2.6* 3.3*   GLOB 3.1 4.1*     Recent Labs     10/02/19  0930 10/02/19  0200 10/01/19  1700  10/01/19  0350 09/30/19  1310   INR  --  1.2*  --   --  1.2* 1.4*   PTP  --  11.6*  --   --  11.6* 14.4*   APTT 46.8* 39.0* 24.5   < >  --   --     < > = values in this interval not displayed. No results for input(s): FE, TIBC, PSAT, FERR in the last 72 hours. Lab Results   Component Value Date/Time    Folate 4.4 (L) 08/09/2018 03:24 AM      No results for input(s): PH, PCO2, PO2 in the last 72 hours. No results for input(s): CPK, CKNDX, TROIQ in the last 72 hours.     No lab exists for component: CPKMB  Lab Results   Component Value Date/Time    Cholesterol, total 94 10/01/2019 03:50 AM    HDL Cholesterol 37 10/01/2019 03:50 AM    LDL, calculated 44.6 10/01/2019 03:50 AM    Triglyceride 62 10/01/2019 03:50 AM    CHOL/HDL Ratio 2.5 10/01/2019 03:50 AM     Lab Results   Component Value Date/Time    Glucose (POC) 138 (H) 09/30/2019 09:55 AM     Lab Results   Component Value Date/Time    Color YELLOW/STRAW 08/27/2015 05:27 PM    Appearance CLEAR 08/27/2015 05:27 PM    Specific gravity 1.017 08/27/2015 05:27 PM    pH (UA) 7.0 08/27/2015 05:27 PM    Protein NEGATIVE  08/27/2015 05:27 PM    Glucose NEGATIVE  08/27/2015 05:27 PM    Ketone 40 (A) 08/27/2015 05:27 PM    Bilirubin NEGATIVE  08/27/2015 05:27 PM    Urobilinogen 0.2 08/27/2015 05:27 PM    Nitrites NEGATIVE  08/27/2015 05:27 PM    Leukocyte Esterase NEGATIVE  08/27/2015 05:27 PM    Epithelial cells FEW 08/27/2015 05:27 PM    Bacteria NEGATIVE  08/27/2015 05:27 PM    WBC 0-4 08/27/2015 05:27 PM    RBC 0-5 08/27/2015 05:27 PM         Medications Reviewed:     Current Facility-Administered Medications   Medication Dose Route Frequency    [START ON 10/3/2019] aspirin chewable tablet 81 mg  81 mg Oral DAILY    magnesium oxide (MAG-OX) tablet 400 mg  400 mg Oral BID    magnesium sulfate 1 g/100 ml IVPB (premix or compounded)  1 g IntraVENous ONCE    atorvastatin (LIPITOR) tablet 80 mg  80 mg Oral DAILY    heparin 25,000 units in D5W 250 ml infusion  12-25 Units/kg/hr IntraVENous TITRATE    acetaminophen (TYLENOL) tablet 650 mg  650 mg Oral Q4H PRN    Or    acetaminophen (TYLENOL) solution 650 mg  650 mg Per NG tube Q4H PRN    Or    acetaminophen (TYLENOL) suppository 650 mg  650 mg Rectal Q4H PRN     ______________________________________________________________________  EXPECTED LENGTH OF STAY: - - -  ACTUAL LENGTH OF STAY:          0                 Lexis Elias MD

## 2019-10-02 NOTE — PROGRESS NOTES
Patient listed as not having a primary care physician. Hospital follow-up PCP transitional care appointment has been scheduled with [address] for Thursday, 10/10/19 at 12:45 p.m. Pending patient discharge. [address] f/u appointment has been re-scheduled for Thursday, 10/24/19 @ 12:45 p.m with [address]   Pending patient discharge, Efrain Hester, Care Management Specialist.

## 2019-10-03 ENCOUNTER — APPOINTMENT (OUTPATIENT)
Dept: CT IMAGING | Age: 64
DRG: 045 | End: 2019-10-03
Attending: PSYCHIATRY & NEUROLOGY
Payer: MEDICAID

## 2019-10-03 LAB
ANION GAP SERPL CALC-SCNC: 7 MMOL/L (ref 5–15)
APTT PPP: 31.5 SEC (ref 22.1–32)
APTT PPP: 56.8 SEC (ref 22.1–32)
APTT PPP: 58.1 SEC (ref 22.1–32)
APTT PPP: 60.6 SEC (ref 22.1–32)
ASPIRIN TEST, ASPIRN: 496 ARU
BUN SERPL-MCNC: 5 MG/DL (ref 6–20)
BUN/CREAT SERPL: 6 (ref 12–20)
CALCIUM SERPL-MCNC: 8.1 MG/DL (ref 8.5–10.1)
CHLORIDE SERPL-SCNC: 92 MMOL/L (ref 97–108)
CO2 SERPL-SCNC: 36 MMOL/L (ref 21–32)
CREAT SERPL-MCNC: 0.83 MG/DL (ref 0.7–1.3)
ERYTHROCYTE [DISTWIDTH] IN BLOOD BY AUTOMATED COUNT: 17.8 % (ref 11.5–14.5)
GLUCOSE SERPL-MCNC: 163 MG/DL (ref 65–100)
HCT VFR BLD AUTO: 24.7 % (ref 36.6–50.3)
HGB BLD-MCNC: 8.1 G/DL (ref 12.1–17)
INR PPP: 1.2 (ref 0.9–1.1)
MAGNESIUM SERPL-MCNC: 1.8 MG/DL (ref 1.6–2.4)
MCH RBC QN AUTO: 33.2 PG (ref 26–34)
MCHC RBC AUTO-ENTMCNC: 32.8 G/DL (ref 30–36.5)
MCV RBC AUTO: 101.2 FL (ref 80–99)
NRBC # BLD: 0 K/UL (ref 0–0.01)
NRBC BLD-RTO: 0 PER 100 WBC
PHOSPHATE SERPL-MCNC: 2.5 MG/DL (ref 2.6–4.7)
PLATELET # BLD AUTO: 253 K/UL (ref 150–400)
PMV BLD AUTO: 9.5 FL (ref 8.9–12.9)
POTASSIUM SERPL-SCNC: 2.4 MMOL/L (ref 3.5–5.1)
PROTHROMBIN TIME: 12.2 SEC (ref 9–11.1)
RBC # BLD AUTO: 2.44 M/UL (ref 4.1–5.7)
SODIUM SERPL-SCNC: 135 MMOL/L (ref 136–145)
THERAPEUTIC RANGE,PTTT: ABNORMAL SECS (ref 58–77)
THERAPEUTIC RANGE,PTTT: NORMAL SECS (ref 58–77)
WBC # BLD AUTO: 12.1 K/UL (ref 4.1–11.1)

## 2019-10-03 PROCEDURE — 74011000258 HC RX REV CODE- 258: Performed by: RADIOLOGY

## 2019-10-03 PROCEDURE — 83735 ASSAY OF MAGNESIUM: CPT

## 2019-10-03 PROCEDURE — 85730 THROMBOPLASTIN TIME PARTIAL: CPT

## 2019-10-03 PROCEDURE — 85576 BLOOD PLATELET AGGREGATION: CPT

## 2019-10-03 PROCEDURE — 97165 OT EVAL LOW COMPLEX 30 MIN: CPT

## 2019-10-03 PROCEDURE — 65660000001 HC RM ICU INTERMED STEPDOWN

## 2019-10-03 PROCEDURE — 74011636320 HC RX REV CODE- 636/320: Performed by: RADIOLOGY

## 2019-10-03 PROCEDURE — 36415 COLL VENOUS BLD VENIPUNCTURE: CPT

## 2019-10-03 PROCEDURE — 97116 GAIT TRAINING THERAPY: CPT

## 2019-10-03 PROCEDURE — 74011250637 HC RX REV CODE- 250/637: Performed by: INTERNAL MEDICINE

## 2019-10-03 PROCEDURE — 74011250636 HC RX REV CODE- 250/636: Performed by: NURSE PRACTITIONER

## 2019-10-03 PROCEDURE — 84100 ASSAY OF PHOSPHORUS: CPT

## 2019-10-03 PROCEDURE — 70498 CT ANGIOGRAPHY NECK: CPT

## 2019-10-03 PROCEDURE — 97112 NEUROMUSCULAR REEDUCATION: CPT

## 2019-10-03 PROCEDURE — 85610 PROTHROMBIN TIME: CPT

## 2019-10-03 PROCEDURE — 85027 COMPLETE CBC AUTOMATED: CPT

## 2019-10-03 PROCEDURE — 80048 BASIC METABOLIC PNL TOTAL CA: CPT

## 2019-10-03 PROCEDURE — 97161 PT EVAL LOW COMPLEX 20 MIN: CPT

## 2019-10-03 PROCEDURE — 74011250637 HC RX REV CODE- 250/637: Performed by: PSYCHIATRY & NEUROLOGY

## 2019-10-03 PROCEDURE — 74011250637 HC RX REV CODE- 250/637: Performed by: NURSE PRACTITIONER

## 2019-10-03 PROCEDURE — 70496 CT ANGIOGRAPHY HEAD: CPT

## 2019-10-03 RX ORDER — SODIUM CHLORIDE 0.9 % (FLUSH) 0.9 %
10 SYRINGE (ML) INJECTION
Status: COMPLETED | OUTPATIENT
Start: 2019-10-03 | End: 2019-10-03

## 2019-10-03 RX ORDER — POTASSIUM CHLORIDE 7.45 MG/ML
10 INJECTION INTRAVENOUS
Status: COMPLETED | OUTPATIENT
Start: 2019-10-03 | End: 2019-10-03

## 2019-10-03 RX ORDER — POTASSIUM CHLORIDE 750 MG/1
40 TABLET, FILM COATED, EXTENDED RELEASE ORAL EVERY 6 HOURS
Status: COMPLETED | OUTPATIENT
Start: 2019-10-03 | End: 2019-10-04

## 2019-10-03 RX ORDER — WARFARIN SODIUM 5 MG/1
5 TABLET ORAL ONCE
Status: COMPLETED | OUTPATIENT
Start: 2019-10-03 | End: 2019-10-03

## 2019-10-03 RX ADMIN — POTASSIUM CHLORIDE 10 MEQ: 10 INJECTION, SOLUTION INTRAVENOUS at 08:00

## 2019-10-03 RX ADMIN — POTASSIUM CHLORIDE 10 MEQ: 10 INJECTION, SOLUTION INTRAVENOUS at 06:41

## 2019-10-03 RX ADMIN — POTASSIUM CHLORIDE 10 MEQ: 10 INJECTION, SOLUTION INTRAVENOUS at 03:30

## 2019-10-03 RX ADMIN — POTASSIUM CHLORIDE 10 MEQ: 10 INJECTION, SOLUTION INTRAVENOUS at 05:15

## 2019-10-03 RX ADMIN — HEPARIN SODIUM AND DEXTROSE 25 UNITS/KG/HR: 10000; 5 INJECTION INTRAVENOUS at 15:15

## 2019-10-03 RX ADMIN — HEPARIN SODIUM AND DEXTROSE 25 UNITS/KG/HR: 10000; 5 INJECTION INTRAVENOUS at 23:39

## 2019-10-03 RX ADMIN — POTASSIUM CHLORIDE 40 MEQ: 750 TABLET, FILM COATED, EXTENDED RELEASE ORAL at 19:25

## 2019-10-03 RX ADMIN — SODIUM CHLORIDE 100 ML: 900 INJECTION, SOLUTION INTRAVENOUS at 15:45

## 2019-10-03 RX ADMIN — POTASSIUM CHLORIDE 40 MEQ: 750 TABLET, FILM COATED, EXTENDED RELEASE ORAL at 23:41

## 2019-10-03 RX ADMIN — WARFARIN SODIUM 5 MG: 5 TABLET ORAL at 18:00

## 2019-10-03 RX ADMIN — ATORVASTATIN CALCIUM 80 MG: 40 TABLET, FILM COATED ORAL at 08:38

## 2019-10-03 RX ADMIN — ASPIRIN 81 MG CHEWABLE TABLET 81 MG: 81 TABLET CHEWABLE at 08:38

## 2019-10-03 RX ADMIN — IOPAMIDOL 100 ML: 755 INJECTION, SOLUTION INTRAVENOUS at 15:44

## 2019-10-03 RX ADMIN — POTASSIUM CHLORIDE 40 MEQ: 750 TABLET, FILM COATED, EXTENDED RELEASE ORAL at 13:00

## 2019-10-03 RX ADMIN — HEPARIN SODIUM AND DEXTROSE 24 UNITS/KG/HR: 10000; 5 INJECTION INTRAVENOUS at 11:05

## 2019-10-03 RX ADMIN — Medication 10 ML: at 15:44

## 2019-10-03 NOTE — PROGRESS NOTES
RAFFI    1. Patient to discharge home when medically stable. 2. Therapy recommended Outpatient Rehab; However, Patient is uninsured and unable to access outpatient services at this time. In addition, Patient not homebound and does not qualify for Home Health services. 3. Appointment has been scheduled with Tony Grullon for Thursday, 10/10/19 at 12:45 p.m.    CM available in case needs arise.  CAROL Moreira,CRM

## 2019-10-03 NOTE — PROGRESS NOTES
Hospitalist Progress Note  Demi Mcneill MD  Answering service: [phone] OR 0824 from in house phone        Date of Service:  10/3/2019  NAME:  Roseann Batista  :  1955  MRN:  [id number]      Admission Summary: This is a 82-year-old  male with past medical history significant for bilateral lower extremity DVT and bilateral PE, on Coumadin, history of kidney stones, history of alcohol abuse and tobacco abuse, presented to [facility] Emergency Department with left hand weakness that started this morning quarter to 07:00 a.m. He stated that he had left hand weakness around 04:00 p.m. yesterday, and the weakness lasted for 3 hours and went away. This morning quarter to 07:00 a.m., he said he felt like someone put pressure on his left arm. He could not make a fist and decided to come to [facility] Emergency Department. No headache, fever, chills, sweating, left-sided chest pain, palpitation, shortness of breath, cough, abdominal pain, urinary complaint, or lower extremity weakness or swelling. No abnormal bowel movement. He took his Coumadin at 6 a.m. this morning. Interval history / Subjective:   Patient seen and evaluated. He was admitted for weakness left hand weakness. CTA showing significant right carotid artery disease. He is now on heparin drip. Neurointervention consulted. Patient says that the weakness is only slightly better ,still present. .No major event being reported by nurse. Assessment & Plan:       1)Symptomatic right carotid thrombus with subsequent small infarcts    -Pt presented with left hand weakness and CTA showed large wall adherent fibrofatty plaque/thrombus in the distal right common carotid artery extending to the bifurcation.  -Neurointervention consulted. Pt is on heparin drip. Will continue to follow recommendations.  -On statin. -ECO c/w EF 56 - 60%.  No regional wall motion abnormality noted.  -Educated about stroke  -As per neurology  continue on heparin drip for now,then transitions to PO AC based on neurointervention recommendations. Will start coumadin consult pharmacy for dosing   physical therapy. Patient needs a new CTA ( Ordered)  , if things improving then coumadin will be ok. If the opposite then Vascular needs to be consulted for further eval and recs       2) Hypokalemia. 2.4 today  -Replacing.  -Mg 1.8  -Repeat lab in am    3) Hypophosphatemia. 2.5 today  -On Neutra-Phos. am labs    4) Leukocytosis, 12.1 today unclear etiology.   -The patient is afebrile, no signs or symptoms  of infection. 5)History of bilateral large PE and bilateral lower extremity DVT, on 08/08/2019.    -Pt took Coumadin on outpatient. Difficult to tell if he was compliant being an alcohol and not knowing when the last time he took it. INR today 1.2  On  Heparin, pharmacy consulted for coumadin dosing    6) Tobacco abuse.    -The patient is advised to stop smoking. 7) H/o alcohol abuse  -Thiamine,folic acid po     Code status:full code  DVT prophylaxis:on heparin  Care Plan discussed with: Patient/Family  Disposition: TBD     Hospital Problems  Date Reviewed: 8/8/2018          Codes Class Noted POA    Carotid artery embolus, right ICD-10-CM: I65.21  ICD-9-CM: 433.10  10/2/2019 Unknown        * (Principal) Carotid artery thrombosis, right ICD-10-CM: I65.21  ICD-9-CM: 433.10  10/1/2019 Unknown        Stroke due to embolism [facility]) ICD-10-CM: I63.9  ICD-9-CM: 434.11  10/1/2019 Unknown        Left hand weakness ICD-10-CM: R29.898  ICD-9-CM: 728.87  9/30/2019 Unknown                Review of Systems:   A comprehensive review of systems was negative except for that written in the HPI. Vital Signs:    Last 24hrs VS reviewed since prior progress note.  Most recent are:  Visit Vitals  /66   Pulse 68   Temp 98.2 °F (36.8 °C)   Resp 18   Ht 5' 10\" (1.778 m)   Wt 85.5 kg (188 lb 9.6 oz)   SpO2 98%   BMI 27.06 kg/m²         Intake/Output Summary (Last 24 hours) at 10/3/2019 1152  Last data filed at 10/3/2019 1124  Gross per 24 hour   Intake 700 ml   Output 2450 ml   Net -1750 ml        Physical Examination:         Constitutional:  No acute distress, cooperative, pleasant    ENT:  Oral mucous moist, oropharynx benign. Resp:  CTA bilaterally. No wheezing/rhonchi/rales. No accessory muscle use   CV:  Regular rhythm, normal rate, no murmurs, gallops, rubs    GI:  Soft, non distended, non tender. normoactive bowel sounds, no hepatosplenomegaly     Musculoskeletal:  No edema, warm, 2+ pulses throughout    Neurologic:  Moves all extremities. However has weakness left arm. AAOx3. Psych:  Good insight, Not anxious nor agitated. Data Review:    Review and/or order of clinical lab test      Labs:     Recent Labs     10/03/19  0132 10/02/19  0200   WBC 12.1* 11.8*   HGB 8.1* 8.1*   HCT 24.7* 24.4*    210     Recent Labs     10/03/19  0132 10/02/19  1615 10/02/19  0200   * 133* 135*   K 2.4* 3.0* 2.2*   CL 92* 92* 92*   CO2 36* 29 38*   BUN 5* 5* 7   CREA 0.83 0.79 0.63*   * 125* 109*   CA 8.1* 8.7 8.1*   MG 1.8  --  1.6   PHOS 2.5*  --  2.9   URICA  --  6.5  --      Recent Labs     10/02/19  0200   SGOT 18   ALT 11*   AP 98   TBILI 0.7   TP 5.7*   ALB 2.6*   GLOB 3.1     Recent Labs     10/03/19  0132 10/02/19  1615 10/02/19  0930  10/02/19  0200  10/01/19  0350   INR 1.2*  --   --   --  1.2*  --  1.2*   PTP 12.2*  --   --   --  11.6*  --  11.6*   APTT 60.6* 48.4* 46.8*   < > 39.0*   < >  --     < > = values in this interval not displayed. No results for input(s): FE, TIBC, PSAT, FERR in the last 72 hours. Lab Results   Component Value Date/Time    Folate 4.4 (L) 08/09/2018 03:24 AM      No results for input(s): PH, PCO2, PO2 in the last 72 hours. No results for input(s): CPK, CKNDX, TROIQ in the last 72 hours.     No lab exists for component: CPKMB  Lab Results   Component Value Date/Time    Cholesterol, total 94 10/01/2019 03:50 AM    HDL Cholesterol 37 10/01/2019 03:50 AM    LDL, calculated 44.6 10/01/2019 03:50 AM    Triglyceride 62 10/01/2019 03:50 AM    CHOL/HDL Ratio 2.5 10/01/2019 03:50 AM     Lab Results   Component Value Date/Time    Glucose (POC) 138 (H) 09/30/2019 09:55 AM     Lab Results   Component Value Date/Time    Color YELLOW/STRAW 08/27/2015 05:27 PM    Appearance CLEAR 08/27/2015 05:27 PM    Specific gravity 1.017 08/27/2015 05:27 PM    pH (UA) 7.0 08/27/2015 05:27 PM    Protein NEGATIVE  08/27/2015 05:27 PM    Glucose NEGATIVE  08/27/2015 05:27 PM    Ketone 40 (A) 08/27/2015 05:27 PM    Bilirubin NEGATIVE  08/27/2015 05:27 PM    Urobilinogen 0.2 08/27/2015 05:27 PM    Nitrites NEGATIVE  08/27/2015 05:27 PM    Leukocyte Esterase NEGATIVE  08/27/2015 05:27 PM    Epithelial cells FEW 08/27/2015 05:27 PM    Bacteria NEGATIVE  08/27/2015 05:27 PM    WBC 0-4 08/27/2015 05:27 PM    RBC 0-5 08/27/2015 05:27 PM         Medications Reviewed:     Current Facility-Administered Medications   Medication Dose Route Frequency    sodium chloride 0.9 % bolus infusion 100 mL  100 mL IntraVENous RAD ONCE    sodium chloride (NS) flush 10 mL  10 mL IntraVENous RAD ONCE    iopamidol (ISOVUE-370) 76 % injection 100 mL  100 mL IntraVENous RAD ONCE    aspirin chewable tablet 81 mg  81 mg Oral DAILY    atorvastatin (LIPITOR) tablet 80 mg  80 mg Oral DAILY    heparin 25,000 units in D5W 250 ml infusion  12-25 Units/kg/hr IntraVENous TITRATE    acetaminophen (TYLENOL) tablet 650 mg  650 mg Oral Q4H PRN    Or    acetaminophen (TYLENOL) solution 650 mg  650 mg Per NG tube Q4H PRN    Or    acetaminophen (TYLENOL) suppository 650 mg  650 mg Rectal Q4H PRN     ______________________________________________________________________  EXPECTED LENGTH OF STAY: 2d 2h  ACTUAL LENGTH OF STAY:          [address], [place]

## 2019-10-03 NOTE — PROGRESS NOTES
Problem: Self Care Deficits Care Plan (Adult)  Goal: *Acute Goals and Plan of Care (Insert Text)  Description  FUNCTIONAL STATUS PRIOR TO ADMISSION: Patient was independent and active without use of DME.    HOME SUPPORT PRIOR TO ADMISSION: The patient lived with spouse but did not require assist. Pt indicated that spouse will not be at home upon DC. Anticipate pt will live at home, however will have friends for limited support. Occupational Therapy Goals  Initiated 10/3/2019     1. Pt will complete LB bathing with S within 7 days. 2. Pt will complete LB dressing with S within 7 days. 3. Pt will complete UB dressing with S within 7 days. 4. Pt will complete grooming/hygeine at sink with set-up/S within 7 days. 5. Pt will complete toilet transfer with S within 7 days. 6. Pt will complete toileting hygiene with S within 7 days. Outcome: Progressing Towards Goal       OCCUPATIONAL THERAPY EVALUATION  Patient: Parvez Odom (68 y.o. male)  Date: 10/3/2019  Primary Diagnosis: Left hand weakness [R29.898]  Carotid artery embolus, right [I65.21]       Precautions:   Fall    ASSESSMENT  Based on the objective data described below, the patient presents with generally decreased strength when performing ADLs, and significantly decreased grasp strength of L hand. 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 Pt reporting no deficits with ADLs, however reporting increased weakness and decreased coordination with L hand. Pt anxious to return to carpentry work and stated that he anticipates his wife will not be at home due to martial issues upon DC . Plan to assess toileting/dressing during pts at a later date due to pt declining to do so on this date. Pt will benefit from outpatient OT upon DC if transportation is accessible.       Current Level of Function Impacting Discharge (ADLs/self-care): Pt is currently min SBA for ADLs. Functional Outcome Measure: The patient scored Total A-D  Total A-D (Motor Function): 57/66 on the Fugl-Wu Assessment which is indicative of mild impairment in upper extremity functional status. Other factors to consider for discharge: Anticipate pt to be living alone upon Dc, pt anxious to return to work     Patient will benefit from skilled therapy intervention to address the above noted impairments. PLAN :  Recommendations and Planned Interventions: self care training, functional mobility training, balance training, therapeutic activities, neuromuscular re-education, patient education and home safety training    Frequency/Duration: Patient will be followed by occupational therapy 4 times a week to address goals. Recommendation for discharge: (in order for the patient to meet his/her long term goals)  Outpatient occupational therapy follow up recommended for decreased strength/coordination in LUE     This discharge recommendation:  A follow-up discussion with the attending provider and/or case management is planned    Equipment recommendations for successful discharge (if) home: none       SUBJECTIVE:   Patient stated I need my hands to do my work.     OBJECTIVE DATA SUMMARY:   HISTORY:   Past Medical History:   Diagnosis Date    Alcohol use     Idiopathic gout of multiple sites 6/7/2016    Kidney stone 10/16/2015    Tobacco abuse    History reviewed. No pertinent surgical history.   Expanded or extensive additional review of patient history:     Home Situation  Home Environment: Private residence  # Steps to Enter: 4  Rails to Enter: No  One/Two Story Residence: One story  Living Alone: No  Support Systems: Spouse/Significant Other/Partner(pts stated spouse will not be present upon DC)  Patient Expects to be Discharged to[de-identified] Private residence  Current DME Used/Available at Home: None    Hand dominance: Left    EXAMINATION OF PERFORMANCE DEFICITS:  Cognitive/Behavioral Status:  Neurologic State: Alert  Orientation Level: Oriented X4  Cognition: Appropriate decision making; Appropriate for age attention/concentration; Appropriate safety awareness; Follows commands  Perception: Appears intact  Perseveration: No perseveration noted  Safety/Judgement: Awareness of environment;Home safety; Insight into deficits    Skin: intact    Edema: none reported    Hearing: Auditory  Auditory Impairment: None    Vision/Perceptual:    Tracking: Able to track stimulus in all quadrants w/o difficulty    Saccades: Within Defined Limits    Convergence: Normal (within 6 inches from nose)       Diplopia: Yes    Acuity: Able to read clock/calendar on wall without difficulty    Corrective Lenses: Glasses(for distance)    Range of Motion:    AROM: Generally decreased, functional  PROM: Within functional limits                      Strength:    Strength: Generally decreased, functional                Coordination:  Coordination: Generally decreased, functional  Fine Motor Skills-Upper: Left Impaired;Right Intact    Gross Motor Skills-Upper: Left Impaired;Right Intact    Tone & Sensation:    Tone: Normal  Sensation: Intact                      Balance:  Sitting: Intact  Standing: Impaired; Without support  Standing - Static: Good  Standing - Dynamic : Fair;Occasional    Functional Mobility and Transfers for ADLs:  Bed Mobility:  Supine to Sit: Stand-by assistance  Sit to Supine: (in chair)  Scooting: Stand-by assistance(seated edge of bed)    Transfers:  Sit to Stand: Contact guard assistance  Stand to Sit: Contact guard assistance  Bed to Chair: Contact guard assistance    ADL Assessment:  Feeding: Setup    Oral Facial Hygiene/Grooming: Supervision    Bathing: Minimum assistance    Upper Body Dressing: Supervision    Lower Body Dressing: Minimum assistance    Toileting: Contact guard assistance                ADL Intervention and task modifications:                                     Cognitive Retraining  Safety/Judgement: Awareness of environment;Home safety; Insight into deficits     Functional Measure:  Fugl-Wu Assessment of Motor Recovery after Stroke:   Reflex Activity  Flexors/Biceps/Fingers: Can be elicited  Extensors/Triceps: Can be elicited  Reflex Subtotal: 4    Volitional Movement Within Synergies  Shoulder Retraction: Full  Shoulder Elevation: Full  Shoulder Abduction (90 degrees): Full  Shoulder External Rotation: Full  Elbow Flexion: Full  Forearm Supination: Full  Shoulder Adduction/Internal Rotation: Full  Elbow Extension: Full  Forearm Pronation: Full  Subtotal: 18    Volitional Movement Mixing Synergies  Hand to Lumbar Spine: Full  Shoulder Flexion (0-90 degrees): Full  Pronation-Supination: Full  Subtotal: 6    Volitional Movement With Little or No Synergy  Shoulder Abduction (0-90 degrees): Full  Shoulder Flexion ( degrees): Full  Pronation/Supination: Full  Subtotal : 6    Normal Reflex Activity  Biceps, Triceps, Finger Flexors: Full  Subtotal : 2    Upper Extremity Total   Upper Extremity Total: 36    Wrist  Stability at 15 Degree Dorsiflexion: Partial  Repeated Dorsiflexion/ Volar Flexion: Partial  Stability at 15 Degree Dorsiflexion: Partial  Repeated Dorsiflexion/ Volar Flexion: Full  Circumduction: Full  Wrist Total: 7    Hand  Mass Flexion: Full  Mass Extension: Full  Grasp A: Partial  Grasp B: Partial  Grasp C: Partial  Grasp D: Full  Grasp E: Partial  Hand Total: 10    Coordination/Speed  Tremor: None  Dysmetria: Slight  Time: 2-5s  Coordination/Speed Total : 4    Total A-D  Total A-D (Motor Function): 57/66     This is a reliable/valid measure of arm function after a neurological event. It has established value to characterize functional status and for measuring spontaneous and therapy-induced recovery; tests proximal and distal motor functions.  Fugl-Wu Assessment  UE scores recorded between five and 30 days post neurologic event can be used to predict UE recovery at six months post neurologic event. Severe = 0-21 points   Moderately Severe = 22-33 points   Moderate = 34-47 points   Mild = 48-66 points  BRIAN Rachel, KAYLA Turner, & MACRINA Barrientos (1992). Measurement of motor recovery after stroke: Outcome assessment and sample size requirements. Stroke, 23, pp. 0241-2323.   ------------------------------------------------------------------------------------------------------------------------------------------------------------------  MCID:  Stroke:   Phill Phan et al, 2001; n = 171; mean age 79 (6) years; assessed within 16 (12) days of stroke, Acute Stroke)  FMA Motor Scores from Admission to Discharge    10 point increase in FMA Upper Extremity = 1.5 change in discharge FIM    10 point increase in FMA Lower Extremity = 1.9 change in discharge FIM  MDC:   Stroke:   Bartolo Woo et al, 2008, n = 14, mean age = 59.9 (14.6) years, assessed on average 14 (6.5) months post stroke, Chronic Stroke)    FMA = 5.2 points for the Upper Extremity portion of the assessment     Occupational Therapy Evaluation Charge Determination   History Examination Decision-Making   MEDIUM Complexity : Expanded review of history including physical, cognitive and psychosocial  history  MEDIUM Complexity : 3-5 performance deficits relating to physical, cognitive , or psychosocial skils that result in activity limitations and / or participation restrictions LOW Complexity : No comorbidities that affect functional and no verbal or physical assistance needed to complete eval tasks       Based on the above components, the patient evaluation is determined to be of the following complexity level: LOW   Pain Rating:  None reported during session    Activity Tolerance:   Good  Please refer to the flowsheet for vital signs taken during this treatment.     After treatment patient left in no apparent distress:    Sitting in chair and Call bell within reach    COMMUNICATION/EDUCATION:   The patients plan of care was discussed with: Physical Therapist and Registered Nurse. Patient was educated regarding his deficit(s) of L sided weakness as this relates to his diagnosis of Carotid artery embolus, right. He demonstrated Good understanding as evidenced by pt verbalizing understanding. Plan to educate on the BE FAST acronym for signs/symptoms of CVA and TIA. BE FAST was written on patient's communication board  for visual education and reinforcement. Plan to assess pt understanding in following session. Home safety education was provided and the patient/caregiver indicated understanding. and Patient/family have participated as able in goal setting and plan of care. This patients plan of care is appropriate for delegation to [facility]. Thank you for this referral.  Charisma Raines OT  Time Calculation: 35 mins   Regarding student involvement in patient care:  A student participated in this treatment session. Per CMS Medicare statements and AOTA guidelines I certify that the following was true:  1. I was present and directly observed the entire session. 2. I made all skilled judgments and clinical decisions regarding care. 3. I am the practitioner responsible for assessment, treatment, and documentation.     Charisma Raines OT

## 2019-10-03 NOTE — PROGRESS NOTES
Pharmacist Note - Warfarin Dosing  Consult provided for this 60 y.o.male to manage warfarin for large distal right common carotid artery thrombus, recent history of bilateral PE and LE DVT. INR Goal: 2 - 3    Home regimen/ tablet size: Patient unsure what dose of Coumadin he was taking prior to admission, or if he was taking it regularly. As such, I don't know if he was subtherapeutic on the reported 5 mg daily, or if this was due to non-compliance. Drugs that may increase INR: None  Drugs that may decrease INR: None  Other current anticoagulants/ drugs that may increase bleeding risk: Aspirin and Heparin  Risk factors: None  Daily INR ordered: YES    Recent Labs     10/03/19  0132 10/02/19  0200 10/01/19  0350   HGB 8.1* 8.1*  --    INR 1.2* 1.2* 1.2*     Date               INR                  Dose  10/3  1.2  5 mg                                                                                Assessment/ Plan: Will order warfarin 5 mg PO x 1 dose. Pharmacy will continue to monitor daily and adjust therapy as indicated. Please contact the pharmacist at x [phone] or  for outpatient recommendations if needed.

## 2019-10-03 NOTE — PROGRESS NOTES
Problem: Pain  Goal: *Control of Pain  Outcome: Progressing Towards Goal     Problem: Falls - Risk of  Goal: *Absence of Falls  Description  Document Abel Vega Fall Risk and appropriate interventions in the flowsheet.   Outcome: Progressing Towards Goal  Note:   Fall Risk Interventions:  Mobility Interventions: Assess mobility with egress test, Communicate number of staff needed for ambulation/transfer, Patient to call before getting OOB, PT Consult for mobility concerns, Utilize walker, cane, or other assistive device, Utilize gait belt for transfers/ambulation         Medication Interventions: Assess postural VS orthostatic hypotension, Patient to call before getting OOB, Teach patient to arise slowly, Utilize gait belt for transfers/ambulation    Elimination Interventions: Call light in reach, Patient to call for help with toileting needs, Toilet paper/wipes in reach, Toileting schedule/hourly rounds, Urinal in reach, Stay With Me (per policy)              Problem: TIA/CVA Stroke: Day 2 Until Discharge  Goal: Activity/Safety  Outcome: Progressing Towards Goal  Goal: Diagnostic Test/Procedures  Outcome: Progressing Towards Goal  Goal: Nutrition/Diet  Outcome: Progressing Towards Goal  Goal: Discharge Planning  Outcome: Progressing Towards Goal  Goal: Medications  Outcome: Progressing Towards Goal  Goal: Respiratory  Outcome: Progressing Towards Goal  Goal: Treatments/Interventions/Procedures  Outcome: Progressing Towards Goal  Goal: Psychosocial  Outcome: Progressing Towards Goal  Goal: *Verbalizes anxiety and depression are reduced or absent  Outcome: Progressing Towards Goal  Goal: *Absence of aspiration  Outcome: Progressing Towards Goal  Goal: *Absence of deep venous thrombosis signs and symptoms(Stroke Metric)  Outcome: Progressing Towards Goal  Goal: *Optimal pain control at patient's stated goal  Outcome: Progressing Towards Goal  Goal: *Tolerating diet  Outcome: Progressing Towards Goal  Goal: *Ability to perform ADLs and demonstrates progressive mobility and function  Outcome: Progressing Towards Goal  Goal: *Stroke education continued(Stroke Metric)  Outcome: Progressing Towards Goal

## 2019-10-03 NOTE — PROGRESS NOTES
Problem: Mobility Impaired (Adult and Pediatric)  Goal: *Acute Goals and Plan of Care (Insert Text)  Description  FUNCTIONAL STATUS PRIOR TO ADMISSION: Patient was fully independent and working prior to admission    [address] [place] [address]: Prior to admission, patient was living with his wife in a 1 story home. Patient reports wife may not be in the picture after discharge, and he will be staying by himself in a different house which is split level (7 stairs with no handrails). Physical Therapy Goals  Initiated 10/3/2019  1. Patient will move from supine to sit and sit to supine , scoot up and down and roll side to side in bed with independence within 7 day(s). 2.  Patient will transfer from bed to chair and chair to bed with independence using the least restrictive device within 7 day(s). 3.  Patient will perform sit to stand with independence within 7 day(s). 4.  Patient will ambulate with independence for 150 feet with the least restrictive device within 7 day(s). 5.  Patient will ascend/descend 7 stairs with no handrail(s) with independence within 7 day(s). Outcome: Progressing Towards Goal  PHYSICAL THERAPY EVALUATION  Patient: Jacobo Moura (72 y.o. male)  Date: 10/3/2019  Primary Diagnosis: Left hand weakness [R29.898]  Carotid artery embolus, right [I65.21]        Precautions:   Fall      ASSESSMENT  Based on the objective data described below, the patient presents with impaired mobility compared to baseline function. MRI positive for scattered small acute cortical/subcortical infarcts throughout the right frontal, parietal, and occipital lobes. Patient also with R carotid artery embolus, on Heparin, and current aPTT 60.6 (therapeutic range 58-92). At baseline, patient lived with his wife, was working full time, and fully independent. This date, her transferred supine<>sit with SBA and sit<>stand with CGA.   Patient ambulated with CGA, noted several small losses of balance and path deviation toward the R which patient was able to self correct. Patient performed stair training with CGA using handrail and Keena without handrail (patient has no handrails at house he plans to discharge to). Session ended with patient sitting up in a chair in handoff to OT. Current Level of Function Impacting Discharge (mobility/balance): Keena for stair navigation     Functional Outcome Measure: The patient scored 33/56 on the Zelaya outcome measure which is indicative of moderate fall risk. Other factors to consider for discharge: impaired balance, stairs with no handrail, unclear assist at home (wife may not be in the picture anymore)     Patient will benefit from skilled therapy intervention to address the above noted impairments. PLAN :  Recommendations and Planned Interventions: bed mobility training, transfer training, gait training, therapeutic exercises, neuromuscular re-education, patient and family training/education and therapeutic activities      Frequency/Duration: Patient will be followed by physical therapy:  5 times a week to address goals. Recommendation for discharge: (in order for the patient to meet his/her long term goals)  Recommending OP neuro PT. Noted patient is uninsured and OP may not be an option. Recommending HH PT once patient has a PCP if able    This discharge recommendation:  Has been made in collaboration with the attending provider and/or case management    Equipment recommendations for successful discharge (if) home: none         SUBJECTIVE:   Patient stated Well I just found out I will be living alone from now on. That wasn't a fun phone call to get from my wife.     OBJECTIVE DATA SUMMARY:   HISTORY:    Past Medical History:   Diagnosis Date    Alcohol use     Idiopathic gout of multiple sites 6/7/2016    Kidney stone 10/16/2015    Tobacco abuse    History reviewed. No pertinent surgical history.     Personal factors and/or comorbidities impacting plan of care: impaired balance, stairs with no handrail, unclear assist at home (wife may not be in the picture anymore), fall risk, uninsured, no PCP    Home Situation  Home Environment: Private residence  # Steps to Enter: 4  Rails to Enter: No  One/Two Story Residence: One story  Living Alone: No  Support Systems: Spouse/Significant Other/Partner(pts stated spouse will not be present upon DC)  Patient Expects to be Discharged to[de-identified] Private residence  Current DME Used/Available at Home: None    EXAMINATION/PRESENTATION/DECISION MAKING:   Critical Behavior:  Neurologic State: Alert  Orientation Level: Oriented X4  Cognition: Appropriate decision making, Appropriate for age attention/concentration, Appropriate safety awareness, Follows commands, Recognition of people/places  Safety/Judgement: Awareness of environment, Home safety, Insight into deficits    Range Of Motion:  AROM: Generally decreased, functional           PROM: Within functional limits           Strength:    Strength: Generally decreased, functional                    Tone & Sensation:   Tone: Normal              Sensation: Intact               Coordination:  Coordination: Generally decreased, functional  Vision:   Tracking: Able to track stimulus in all quadrants w/o difficulty  Saccades: Within Defined Limits  Convergence: Normal (within 6 inches from nose)  Diplopia: Yes  Acuity: Able to read clock/calendar on wall without difficulty  Corrective Lenses: Glasses(for distance)  Functional Mobility:  Bed Mobility:     Supine to Sit: Stand-by assistance  Sit to Supine: (in chair)  Scooting: Stand-by assistance(seated edge of bed)  Transfers:  Sit to Stand: Contact guard assistance  Stand to Sit: Contact guard assistance        Bed to Chair: Contact guard assistance              Balance:   Sitting: Intact  Standing: Impaired; Without support  Standing - Static: Good  Standing - Dynamic : Fair;Occasional  Ambulation/Gait Training:  Distance (ft): 80 Feet (ft)(x 4 reps)  Assistive Device: Gait belt  Ambulation - Level of Assistance: Contact guard assistance        Gait Abnormalities: Decreased step clearance;Shuffling gait;Trunk sway increased; Path deviations        Base of Support: Widened     Speed/Nataliia: Shuffled; Slow  Step Length: Left shortened;Right shortened     Stairs:  Number of Stairs Trained: 4(x2)  Stairs - Level of Assistance: Minimum assistance   Rail Use: (first set wiht R handrail, second set without handrail)      Functional Measure:  Zelaya Balance Test:    Sitting to Standing: 3  Standing Unsupported: 3  Sitting with Back Unsupported: 4  Standing to Sitting: 3  Transfers: 3  Standing Unsupported with Eyes Closed: 3  Standing Unsupported with Feet Together: 3  Reach Forward with Outstretched Arm: 1   Object: 3  Turn to Look Over Shoulders: 0  Turn 360 Degrees: 1  Alternate Foot on Step/Stool: 2  Standing Unsupported One Foot in Front: 3  Stand on One Le  Total: 33/56         56=Maximum possible score;   0-20=High fall risk  21-40=Moderate fall risk   41-56=Low fall risk      Physical Therapy Evaluation Charge Determination   History Examination Presentation Decision-Making   HIGH Complexity :3+ comorbidities / personal factors will impact the outcome/ POC  HIGH Complexity : 4+ Standardized tests and measures addressing body structure, function, activity limitation and / or participation in recreation  LOW Complexity : Stable, uncomplicated  LOW Complexity : FOTO score of       Based on the above components, the patient evaluation is determined to be of the following complexity level: LOW     Activity Tolerance:   Good  Please refer to the flowsheet for vital signs taken during this treatment. After treatment patient left in no apparent distress:   Sitting in chair and handoff to OT     COMMUNICATION/EDUCATION:   The patients plan of care was discussed with: Occupational Therapist, Registered Nurse and .     Fall prevention education was provided and the patient/caregiver indicated understanding., Patient/family have participated as able in goal setting and plan of care. and Patient/family agree to work toward stated goals and plan of care.     Thank you for this referral.  Shaun Blancas, PT, DPT   Time Calculation: 22 mins

## 2019-10-03 NOTE — PROGRESS NOTES
Physical Therapy:     Orders received, chart reviewed and patient evaluated by physical therapy. Pending progression with skilled acute physical therapy, recommend:  Outpatient neuro PT    Recommend with nursing patient to complete as able in order to maintain strength, endurance and independence: OOB to chair 3x/day and ambulating with CGA of one. Thank you for your assistance. Full evaluation to follow.

## 2019-10-03 NOTE — PROGRESS NOTES
Problem: TIA/CVA Stroke: Day 2 Until Discharge  Goal: Treatments/Interventions/Procedures  Outcome: Progressing Towards Goal  Goal: Psychosocial  Outcome: Progressing Towards Goal  Goal: *Verbalizes anxiety and depression are reduced or absent  Outcome: Progressing Towards Goal  Goal: *Absence of aspiration  Outcome: Progressing Towards Goal  Goal: *Optimal pain control at patient's stated goal  Outcome: Progressing Towards Goal  Goal: *Tolerating diet  Outcome: Progressing Towards Goal  Goal: *Ability to perform ADLs and demonstrates progressive mobility and function  Outcome: Progressing Towards Goal

## 2019-10-03 NOTE — PROGRESS NOTES
Rounded on Shinto patients and provided Anointing of the Sick at request of patient    Fr. Marcial Maxwell

## 2019-10-03 NOTE — PROGRESS NOTES
Bedside shift change report given to Tay (oncoming nurse) by Blease Curling (offgoing nurse). Report included the following information SBAR, Kardex, ED Summary, Accordion, Recent Results and Cardiac Rhythm NSR w/ BBB.

## 2019-10-04 LAB
ANION GAP SERPL CALC-SCNC: 7 MMOL/L (ref 5–15)
APTT PPP: 65.3 SEC (ref 22.1–32)
AT III PPP CHRO-ACNC: 83 % (ref 75–135)
BUN SERPL-MCNC: 4 MG/DL (ref 6–20)
BUN/CREAT SERPL: 7 (ref 12–20)
CALCIUM SERPL-MCNC: 8 MG/DL (ref 8.5–10.1)
CHLORIDE SERPL-SCNC: 99 MMOL/L (ref 97–108)
CO2 SERPL-SCNC: 34 MMOL/L (ref 21–32)
CREAT SERPL-MCNC: 0.6 MG/DL (ref 0.7–1.3)
ERYTHROCYTE [DISTWIDTH] IN BLOOD BY AUTOMATED COUNT: 18.3 % (ref 11.5–14.5)
GLUCOSE SERPL-MCNC: 104 MG/DL (ref 65–100)
HCT VFR BLD AUTO: 23.5 % (ref 36.6–50.3)
HGB BLD-MCNC: 7.7 G/DL (ref 12.1–17)
INR PPP: 1.2 (ref 0.9–1.1)
MAGNESIUM SERPL-MCNC: 1.9 MG/DL (ref 1.6–2.4)
MCH RBC QN AUTO: 33.8 PG (ref 26–34)
MCHC RBC AUTO-ENTMCNC: 32.8 G/DL (ref 30–36.5)
MCV RBC AUTO: 103.1 FL (ref 80–99)
NRBC # BLD: 0 K/UL (ref 0–0.01)
NRBC BLD-RTO: 0 PER 100 WBC
PHOSPHATE SERPL-MCNC: 2.9 MG/DL (ref 2.6–4.7)
PLATELET # BLD AUTO: 270 K/UL (ref 150–400)
PMV BLD AUTO: 9.7 FL (ref 8.9–12.9)
POTASSIUM SERPL-SCNC: 3 MMOL/L (ref 3.5–5.1)
PROT S ACT/NOR PPP: 213 % (ref 57–157)
PROT S PPP-ACNC: 105 % (ref 60–150)
PROTHROMBIN TIME: 11.7 SEC (ref 9–11.1)
RBC # BLD AUTO: 2.28 M/UL (ref 4.1–5.7)
SODIUM SERPL-SCNC: 140 MMOL/L (ref 136–145)
THERAPEUTIC RANGE,PTTT: ABNORMAL SECS (ref 58–77)
WBC # BLD AUTO: 10.9 K/UL (ref 4.1–11.1)

## 2019-10-04 PROCEDURE — 74011250636 HC RX REV CODE- 250/636: Performed by: NURSE PRACTITIONER

## 2019-10-04 PROCEDURE — 36415 COLL VENOUS BLD VENIPUNCTURE: CPT

## 2019-10-04 PROCEDURE — 97112 NEUROMUSCULAR REEDUCATION: CPT

## 2019-10-04 PROCEDURE — 85730 THROMBOPLASTIN TIME PARTIAL: CPT

## 2019-10-04 PROCEDURE — 74011250637 HC RX REV CODE- 250/637: Performed by: NURSE PRACTITIONER

## 2019-10-04 PROCEDURE — 83735 ASSAY OF MAGNESIUM: CPT

## 2019-10-04 PROCEDURE — 84100 ASSAY OF PHOSPHORUS: CPT

## 2019-10-04 PROCEDURE — 74011250637 HC RX REV CODE- 250/637: Performed by: HOSPITALIST

## 2019-10-04 PROCEDURE — 74011250637 HC RX REV CODE- 250/637: Performed by: INTERNAL MEDICINE

## 2019-10-04 PROCEDURE — 85027 COMPLETE CBC AUTOMATED: CPT

## 2019-10-04 PROCEDURE — 85610 PROTHROMBIN TIME: CPT

## 2019-10-04 PROCEDURE — 97116 GAIT TRAINING THERAPY: CPT

## 2019-10-04 PROCEDURE — 65660000000 HC RM CCU STEPDOWN

## 2019-10-04 PROCEDURE — 74011250637 HC RX REV CODE- 250/637: Performed by: PSYCHIATRY & NEUROLOGY

## 2019-10-04 PROCEDURE — 80048 BASIC METABOLIC PNL TOTAL CA: CPT

## 2019-10-04 RX ORDER — POTASSIUM CHLORIDE 1.5 G/1.77G
40 POWDER, FOR SOLUTION ORAL
Status: COMPLETED | OUTPATIENT
Start: 2019-10-04 | End: 2019-10-04

## 2019-10-04 RX ORDER — WARFARIN SODIUM 5 MG/1
5 TABLET ORAL ONCE
Status: COMPLETED | OUTPATIENT
Start: 2019-10-04 | End: 2019-10-04

## 2019-10-04 RX ORDER — PANTOPRAZOLE SODIUM 40 MG/1
40 TABLET, DELAYED RELEASE ORAL DAILY
Status: DISCONTINUED | OUTPATIENT
Start: 2019-10-04 | End: 2019-10-17 | Stop reason: HOSPADM

## 2019-10-04 RX ADMIN — ASPIRIN 81 MG CHEWABLE TABLET 81 MG: 81 TABLET CHEWABLE at 08:41

## 2019-10-04 RX ADMIN — HEPARIN SODIUM AND DEXTROSE 25 UNITS/KG/HR: 10000; 5 INJECTION INTRAVENOUS at 07:13

## 2019-10-04 RX ADMIN — POTASSIUM CHLORIDE 40 MEQ: 1.5 POWDER, FOR SOLUTION ORAL at 16:04

## 2019-10-04 RX ADMIN — HEPARIN SODIUM AND DEXTROSE 25 UNITS/KG/HR: 10000; 5 INJECTION INTRAVENOUS at 21:01

## 2019-10-04 RX ADMIN — WARFARIN SODIUM 5 MG: 5 TABLET ORAL at 16:33

## 2019-10-04 RX ADMIN — ATORVASTATIN CALCIUM 80 MG: 40 TABLET, FILM COATED ORAL at 08:41

## 2019-10-04 RX ADMIN — POTASSIUM CHLORIDE 40 MEQ: 750 TABLET, FILM COATED, EXTENDED RELEASE ORAL at 06:13

## 2019-10-04 RX ADMIN — PANTOPRAZOLE SODIUM 40 MG: 40 TABLET, DELAYED RELEASE ORAL at 15:54

## 2019-10-04 NOTE — CONSULTS
Neurology Progress Note    Patient ID:  Radha Lugo  [id number]  60 y.o.  1955    Chief Complaint:  stroke    Subjective:     61-year-old gentleman with right MCA infarct in the setting of carotid stenosis. Left hand is getting better. Repeat CTA without much change. INR still subtherapeutic. Objective:       ROS:  Per HPI  All other 12 pt ROS negative    Meds:  Current Facility-Administered Medications   Medication Dose Route Frequency    warfarin (COUMADIN) tablet 5 mg  5 mg Oral ONCE    Warfarin - pharmacy to dose   Other Rx Dosing/Monitoring    aspirin chewable tablet 81 mg  81 mg Oral DAILY    atorvastatin (LIPITOR) tablet 80 mg  80 mg Oral DAILY    heparin 25,000 units in D5W 250 ml infusion  12-25 Units/kg/hr IntraVENous TITRATE    acetaminophen (TYLENOL) tablet 650 mg  650 mg Oral Q4H PRN    Or    acetaminophen (TYLENOL) solution 650 mg  650 mg Per NG tube Q4H PRN    Or    acetaminophen (TYLENOL) suppository 650 mg  650 mg Rectal Q4H PRN       MRI Results (maximum last 3): Results from [place] encounter on 09/30/19   MRI BRAIN W WO CONT    Narrative EXAM:  MRI BRAIN W WO CONT    INDICATION:    left hand weakness    COMPARISON:  CT head 9/30/2019. CONTRAST: 10 ml [place]. TECHNIQUE:    Multiplanar multisequence acquisition without and with contrast of the brain. FINDINGS:  Scattered small acute cortical/subcortical infarcts throughout the right  frontal, parietal, and occipital lobes, predominantly in the frontoparietal  lobes. No evidence of acute hemorrhage. The ventricles are normal in size and position. There is no extra-axial fluid  collection or mass effect. There is no cerebellar tonsillar herniation. Expected  arterial flow-voids are present. No evidence of abnormal enhancement. The paranasal sinuses, mastoid air cells, and middle ears are clear. The orbital  contents are within normal limits. No significant osseous or scalp lesions are  identified. Impression IMPRESSION:     1. Scattered small acute cortical/subcortical infarcts throughout the right  frontal, parietal, and occipital lobes.     23X           Lab Review   Recent Results (from the past 24 hour(s))   PTT    Collection Time: 10/03/19  1:35 PM   Result Value Ref Range    aPTT 56.8 (H) 22.1 - 32.0 sec    aPTT, therapeutic range     58.0 - 77.0 SECS   PTT    Collection Time: 10/03/19  7:25 PM   Result Value Ref Range    aPTT 58.1 (H) 22.1 - 32.0 sec    aPTT, therapeutic range     58.0 - 77.0 SECS   PROTHROMBIN TIME + INR    Collection Time: 10/04/19  1:27 AM   Result Value Ref Range    INR 1.2 (H) 0.9 - 1.1      Prothrombin time 11.7 (H) 9.0 - 11.1 sec   PTT    Collection Time: 10/04/19  1:27 AM   Result Value Ref Range    aPTT 65.3 (H) 22.1 - 32.0 sec    aPTT, therapeutic range     58.0 - 77.0 SECS   MAGNESIUM    Collection Time: 10/04/19  1:27 AM   Result Value Ref Range    Magnesium 1.9 1.6 - 2.4 mg/dL   PHOSPHORUS    Collection Time: 10/04/19  1:27 AM   Result Value Ref Range    Phosphorus 2.9 2.6 - 4.7 MG/DL   METABOLIC PANEL, BASIC    Collection Time: 10/04/19  1:27 AM   Result Value Ref Range    Sodium 140 136 - 145 mmol/L    Potassium 3.0 (L) 3.5 - 5.1 mmol/L    Chloride 99 97 - 108 mmol/L    CO2 34 (H) 21 - 32 mmol/L    Anion gap 7 5 - 15 mmol/L    Glucose 104 (H) 65 - 100 mg/dL    BUN 4 (L) 6 - 20 MG/DL    Creatinine 0.60 (L) 0.70 - 1.30 MG/DL    BUN/Creatinine ratio 7 (L) 12 - 20      GFR est AA >60 >60 ml/min/1.73m2    GFR est non-AA >60 >60 ml/min/1.73m2    Calcium 8.0 (L) 8.5 - 10.1 MG/DL   CBC W/O DIFF    Collection Time: 10/04/19  1:27 AM   Result Value Ref Range    WBC 10.9 4.1 - 11.1 K/uL    RBC 2.28 (L) 4.10 - 5.70 M/uL    HGB 7.7 (L) 12.1 - 17.0 g/dL    HCT 23.5 (L) 36.6 - 50.3 %    .1 (H) 80.0 - 99.0 FL    MCH 33.8 26.0 - 34.0 PG    MCHC 32.8 30.0 - 36.5 g/dL    RDW 18.3 (H) 11.5 - 14.5 %    PLATELET 110 345 - 486 K/uL    MPV 9.7 8.9 - 12.9 FL    NRBC 0.0 0  WBC ABSOLUTE NRBC 0.00 0.00 - 0.01 K/uL       Additional comments:I reviewed the patients new imaging test results. Patient Vitals for the past 8 hrs:   BP Temp Pulse Resp SpO2   10/04/19 1102 95/60       10/04/19 0956 (!) 89/57 98.3 °F (36.8 °C) 81 15 98 %   10/04/19 0822 97/64 98 °F (36.7 °C) (!) 59 13 99 %   10/04/19 0612 105/63 98.1 °F (36.7 °C) (!) 59 14 99 %       10/04 0701 - 10/04 1900  In: 240 [P.O.:240]  Out: 300 [Urine:300]  10/02 1901 - 10/04 0700  In: 650 [P.O.:650]  Out: 3100 [Urine:3100]    Exam:  Visit Vitals  BP 95/60   Pulse 81   Temp 98.3 °F (36.8 °C)   Resp 15   Ht 5' 10\" (1.778 m)   Wt 86.5 kg (190 lb 11.2 oz)   SpO2 98%   BMI 27.36 kg/m²     Adult gentleman in bed awake and alert who can give me good historical details. Pupils are equal, gaze is conjugate, EOMI, visual fields full  Face is asymmetric to the left, tongue is midline speech is mildly dysarthric but no aphasia  Motor testing revealing a left-sided weakness 4+ arm and leg  Sensation grossly intact without neglect  No ataxia  Gait deferred    PROBLEM LIST:     Patient Active Problem List   Diagnosis Code    Idiopathic gout of multiple sites M10.09    Pulmonary emboli (HCC) I26.99    Tobacco abuse Z72.0    Alcohol use Z72.89    Chest pain, pleuritic R07.81    Left hand weakness R29.898    Carotid artery thrombosis, right I65.21    Stroke due to embolism (HCC) I63.9    Carotid artery embolus, right I65.21       Assessment/Plan:      60year-old gentleman with right MCA infarcts in setting of carotid stenosis. He is having some clinical improvement. Need to get INR 23. Baby aspirin should be on board. He needs to remain on anticoagulation and low-dose antiplatelet. Neuro interventional on board following repeat CTA. I will follow peripherally. Please call if needed. During this evaluation, we also discussed stroke education to include signs and symptoms of stroke and TIA.    This clinical note was dictated with an electronic dictation software that can make unintentional errors. If there are any questions, please contact me directly for clarification.       Signed:  Stephen Loredo DO  10/4/2019  11:22 AM

## 2019-10-04 NOTE — PROGRESS NOTES
Bedside shift change report given to Tay (oncoming nurse) by Carmen Harris (offgoing nurse).  Report included the following information SBAR, Kardex, Intake/Output, MAR, Accordion, Recent Results and Cardiac Rhythm Sinus JOSE Nieto.

## 2019-10-04 NOTE — ROUTINE PROCESS
Bedside shift change report given to [address] [place] [address] (oncoming nurse) by Braulio Dick RN (offgoing nurse). Report included the following information SBAR, Kardex, ED Summary, Procedure Summary, Intake/Output, MAR, Accordion, Recent Results and Cardiac Rhythm NSR.

## 2019-10-04 NOTE — PROGRESS NOTES
Problem: TIA/CVA Stroke: Day 2 Until Discharge  Goal: Activity/Safety  Outcome: Progressing Towards Goal  Goal: Diagnostic Test/Procedures  Outcome: Progressing Towards Goal  Goal: Nutrition/Diet  Outcome: Progressing Towards Goal  Goal: Discharge Planning  Outcome: Progressing Towards Goal  Goal: Medications  Outcome: Progressing Towards Goal  Goal: Respiratory  Outcome: Progressing Towards Goal  Goal: Treatments/Interventions/Procedures  Outcome: Progressing Towards Goal  Goal: *Absence of aspiration  Outcome: Progressing Towards Goal

## 2019-10-04 NOTE — CONSULTS
Discussed with Dr. Amanda Valentino. No role for Neurointervention at this time. If thrombus is improving with heparin drip on follow up CTA, okay to transition to Coumadin. If not, please consult vascular surgery.

## 2019-10-04 NOTE — ROUTINE PROCESS
Bedside and Verbal shift change report given to JAMES Bloom  (oncoming nurse) by Robel Mcneil RN  (offgoing nurse). Report included the following information SBAR, Kardex, ED Summary, Procedure Summary, Intake/Output, MAR, Recent Results, Med Rec Status, Cardiac Rhythm NSR. , Alarm Parameters , Pre Procedure Checklist, Procedure Verification and Quality Measures.

## 2019-10-04 NOTE — ROUTINE PROCESS
Bedside and Verbal shift change report given to Saturnino Field RN  (oncoming nurse) by Vamsi Moscoso RN  (offgoing nurse). Report included the following information SBAR, Kardex, Procedure Summary, Intake/Output, MAR, Recent Results, Med Rec Status, Cardiac Rhythm NSR.  and Alarm Parameters Laverne Carter

## 2019-10-04 NOTE — PROGRESS NOTES
Problem: Pain  Goal: *Control of Pain  Outcome: Progressing Towards Goal  Goal: *PALLIATIVE CARE:  Alleviation of Pain  Outcome: Progressing Towards Goal     Problem: Falls - Risk of  Goal: *Absence of Falls  Description  Document Deanna Reardon Fall Risk and appropriate interventions in the flowsheet.   Outcome: Progressing Towards Goal  Note:   Fall Risk Interventions:  Mobility Interventions: Patient to call before getting OOB, Communicate number of staff needed for ambulation/transfer         Medication Interventions: Assess postural VS orthostatic hypotension    Elimination Interventions: Call light in reach, Patient to call for help with toileting needs              Problem: TIA/CVA Stroke: Day 2 Until Discharge  Goal: Activity/Safety  Outcome: Progressing Towards Goal  Goal: Diagnostic Test/Procedures  Outcome: Progressing Towards Goal  Goal: Nutrition/Diet  Outcome: Progressing Towards Goal  Goal: Discharge Planning  Outcome: Progressing Towards Goal  Goal: Medications  Outcome: Progressing Towards Goal  Goal: Respiratory  Outcome: Progressing Towards Goal  Goal: Treatments/Interventions/Procedures  Outcome: Progressing Towards Goal  Goal: Psychosocial  Outcome: Progressing Towards Goal  Goal: *Verbalizes anxiety and depression are reduced or absent  Outcome: Progressing Towards Goal  Goal: *Absence of aspiration  Outcome: Progressing Towards Goal  Goal: *Absence of deep venous thrombosis signs and symptoms(Stroke Metric)  Outcome: Progressing Towards Goal  Goal: *Optimal pain control at patient's stated goal  Outcome: Progressing Towards Goal  Goal: *Tolerating diet  Outcome: Progressing Towards Goal  Goal: *Ability to perform ADLs and demonstrates progressive mobility and function  Outcome: Progressing Towards Goal  Goal: *Stroke education continued(Stroke Metric)  Outcome: Progressing Towards Goal

## 2019-10-04 NOTE — PROGRESS NOTES
Pharmacist Note - Warfarin Dosing  Consult provided for this 60 y.o.male to manage warfarin for large distal right common carotid artery thrombus, recent history of bilateral PE and LE DVT. INR Goal: 2 - 3    Home regimen/ tablet size: Patient unsure what dose of Coumadin he was taking prior to admission, or if he was taking it regularly. As such, I don't know if he was subtherapeutic on the reported 5 mg daily, or if this was due to non-compliance. Notes suggest he was discharged on Eliquis last month, but could not afford so restarted warfarin. Drugs that may increase INR: None  Drugs that may decrease INR: None  Other current anticoagulants/ drugs that may increase bleeding risk: Aspirin and Heparin  Risk factors: None  Daily INR ordered: YES    Recent Labs     10/04/19  0127 10/03/19  0132 10/02/19  0200   HGB 7.7* 8.1* 8.1*   INR 1.2* 1.2* 1.2*     Date               INR                  Dose  10/3  1.2  5 mg   10/4  1.2  5 mg                                                                                Assessment/ Plan: Will order warfarin 5 mg PO x 1 dose. Pharmacy will continue to monitor daily and adjust therapy as indicated. Please contact the pharmacist at x [phone] or  for outpatient recommendations if needed.

## 2019-10-04 NOTE — PROGRESS NOTES
Hospitalist Progress Note  Jose Carlos Guardado MD  Answering service: [phone] from in house phone        Date of Service:  10/4/2019  NAME:  Breonna Tejeda  :  1955  MRN:  [id number]      Admission Summary: This is a 71-year-old  male with past medical history significant for bilateral lower extremity DVT and bilateral PE, on Coumadin, history of kidney stones, history of alcohol abuse and tobacco abuse, presented to [facility] Emergency Department with left hand weakness that started this morning quarter to 07:00 a.m. He stated that he had left hand weakness around 04:00 p.m. yesterday, and the weakness lasted for 3 hours and went away. This morning quarter to 07:00 a.m., he said he felt like someone put pressure on his left arm. He could not make a fist and decided to come to [facility] Emergency Department. No headache, fever, chills, sweating, left-sided chest pain, palpitation, shortness of breath, cough, abdominal pain, urinary complaint, or lower extremity weakness or swelling. No abnormal bowel movement. He took his Coumadin at 6 a.m. this morning. Interval history / Subjective:   Patient seen and evaluated. He was admitted for weakness left hand weakness. CTA showing significant right carotid artery disease. He is now on heparin drip. Neurointervention consulted. Patient says that the weakness is only slightly better ,still present. .No major event being reported by nurse. Assessment & Plan:       Symptomatic right carotid thrombus with subsequent small infarcts    -Pt presented with left hand weakness and CTA showed large wall adherent fibrofatty plaque/thrombus in the distal right common carotid artery extending to the bifurcation.  -Neurointervention consulted. Pt is on heparin drip. Will continue to follow recommendations.  -On statin. -ECO c/w EF 56 - 60%.  No regional wall motion abnormality noted.  -Educated about stroke  -As per neurology  continue on heparin drip for now,then transitions to PO AC based on neurointervention recommendations. Started coumadin 10/3  consult pharmacy for dosing   physical therapy. Goal of INR 2-3,should have overlap 1-2 days of heparin drip when INR reach 2. -d/w Neurologist and NIS Dr. Jenny Hernandez, per N[facility], consider repeat CTA of head/neck in a few days ( Monday) when pt remain on therepeutic heparin drip     Anemia:   Gradually drop Hb  Admission Hb 8.1, Hb was 10.7 on 9/30. Today 7.7. Start protonix. Check Stool OB. May transfuse if Hb< 7. Hypokalemia. -Replacing.  -Mg 1.8  -Repeat lab in am TODAY 3.0     Hypophosphatemia.   -On Neutra-Phos. am labs     Leukocytosis, 12.1 10/3   -The patient is afebrile, no signs or symptoms  of infection. -MONITOR     H/o  bilateral large PE and bilateral lower extremity DVT, on 08/08/2018    -Pt took Coumadin on outpatient. Difficult to tell if he was compliant being an alcohol and not knowing when the last time he took it. On  Heparin, pharmacy consulted for coumadin dosing    6) Tobacco abuse.    -The patient is advised to stop smoking. 7) H/o alcohol abuse  -Thiamine,folic acid po     Code status:full code  DVT prophylaxis:on heparin drip   Care Plan discussed with: Patient/Family  Disposition: TBD     Hospital Problems  Date Reviewed: 8/8/2018          Codes Class Noted POA    Carotid artery embolus, right ICD-10-CM: I65.21  ICD-9-CM: 433.10  10/2/2019 Unknown        * (Principal) Carotid artery thrombosis, right ICD-10-CM: I65.21  ICD-9-CM: 433.10  10/1/2019 Unknown        Stroke due to embolism [facility]) ICD-10-CM: I63.9  ICD-9-CM: 434.11  10/1/2019 Unknown        Left hand weakness ICD-10-CM: R29.898  ICD-9-CM: 728.87  9/30/2019 Unknown                Review of Systems:   A comprehensive review of systems was negative except for that written in the HPI. Vital Signs:    Last 24hrs VS reviewed since prior progress note.  Most recent are:  Visit Vitals  /58   Pulse 64   Temp 97.9 °F (36.6 °C)   Resp 18   Ht 5' 10\" (1.778 m)   Wt 86.5 kg (190 lb 11.2 oz)   SpO2 98%   BMI 27.36 kg/m²         Intake/Output Summary (Last 24 hours) at 10/4/2019 1444  Last data filed at 10/4/2019 1300  Gross per 24 hour   Intake 1130 ml   Output 1750 ml   Net -620 ml        Physical Examination:         Constitutional:  No acute distress, cooperative, pleasant    ENT:  Oral mucous moist, oropharynx benign. Resp:  CTA bilaterally. No wheezing/rhonchi/rales. No accessory muscle use   CV:  Regular rhythm, normal rate, no murmurs, gallops, rubs    GI:  Soft, non distended, non tender. normoactive bowel sounds, no hepatosplenomegaly     Musculoskeletal:  No edema, warm, 2+ pulses throughout    Neurologic:  Moves all extremities. However has weakness left arm. AAOx3. Psych:  Good insight, Not anxious nor agitated. Data Review:    Review and/or order of clinical lab test      Labs:     Recent Labs     10/04/19  0127 10/03/19  0132   WBC 10.9 12.1*   HGB 7.7* 8.1*   HCT 23.5* 24.7*    253     Recent Labs     10/04/19  0127 10/03/19  0132 10/02/19  1615 10/02/19  0200    135* 133* 135*   K 3.0* 2.4* 3.0* 2.2*   CL 99 92* 92* 92*   CO2 34* 36* 29 38*   BUN 4* 5* 5* 7   CREA 0.60* 0.83 0.79 0.63*   * 163* 125* 109*   CA 8.0* 8.1* 8.7 8.1*   MG 1.9 1.8  --  1.6   PHOS 2.9 2.5*  --  2.9   URICA  --   --  6.5  --      Recent Labs     10/02/19  0200   SGOT 18   ALT 11*   AP 98   TBILI 0.7   TP 5.7*   ALB 2.6*   GLOB 3.1     Recent Labs     10/04/19  0127 10/03/19  1925 10/03/19  1335 10/03/19  0132  10/02/19  0200   INR 1.2*  --   --  1.2*  --  1.2*   PTP 11.7*  --   --  12.2*  --  11.6*   APTT 65.3* 58.1* 56.8* 60.6*   < > 39.0*    < > = values in this interval not displayed. No results for input(s): FE, TIBC, PSAT, FERR in the last 72 hours.    Lab Results   Component Value Date/Time    Folate 4.4 (L) 08/09/2018 03:24 AM      No results for input(s): PH, PCO2, PO2 in the last 72 hours. No results for input(s): CPK, CKNDX, TROIQ in the last 72 hours.     No lab exists for component: CPKMB  Lab Results   Component Value Date/Time    Cholesterol, total 94 10/01/2019 03:50 AM    HDL Cholesterol 37 10/01/2019 03:50 AM    LDL, calculated 44.6 10/01/2019 03:50 AM    Triglyceride 62 10/01/2019 03:50 AM    CHOL/HDL Ratio 2.5 10/01/2019 03:50 AM     Lab Results   Component Value Date/Time    Glucose (POC) 138 (H) 09/30/2019 09:55 AM     Lab Results   Component Value Date/Time    Color YELLOW/STRAW 08/27/2015 05:27 PM    Appearance CLEAR 08/27/2015 05:27 PM    Specific gravity 1.017 08/27/2015 05:27 PM    pH (UA) 7.0 08/27/2015 05:27 PM    Protein NEGATIVE  08/27/2015 05:27 PM    Glucose NEGATIVE  08/27/2015 05:27 PM    Ketone 40 (A) 08/27/2015 05:27 PM    Bilirubin NEGATIVE  08/27/2015 05:27 PM    Urobilinogen 0.2 08/27/2015 05:27 PM    Nitrites NEGATIVE  08/27/2015 05:27 PM    Leukocyte Esterase NEGATIVE  08/27/2015 05:27 PM    Epithelial cells FEW 08/27/2015 05:27 PM    Bacteria NEGATIVE  08/27/2015 05:27 PM    WBC 0-4 08/27/2015 05:27 PM    RBC 0-5 08/27/2015 05:27 PM         Medications Reviewed:     Current Facility-Administered Medications   Medication Dose Route Frequency    warfarin (COUMADIN) tablet 5 mg  5 mg Oral ONCE    pantoprazole (PROTONIX) tablet 40 mg  40 mg Oral DAILY    Warfarin - pharmacy to dose   Other Rx Dosing/Monitoring    aspirin chewable tablet 81 mg  81 mg Oral DAILY    atorvastatin (LIPITOR) tablet 80 mg  80 mg Oral DAILY    heparin 25,000 units in D5W 250 ml infusion  12-25 Units/kg/hr IntraVENous TITRATE    acetaminophen (TYLENOL) tablet 650 mg  650 mg Oral Q4H PRN    Or    acetaminophen (TYLENOL) solution 650 mg  650 mg Per NG tube Q4H PRN    Or    acetaminophen (TYLENOL) suppository 650 mg  650 mg Rectal Q4H PRN     ______________________________________________________________________  EXPECTED LENGTH OF STAY: 2d 2h  ACTUAL LENGTH OF STAY:          2                 Bala Almendarez MD

## 2019-10-05 LAB
ANION GAP SERPL CALC-SCNC: 7 MMOL/L (ref 5–15)
APTT PPP: 68.6 SEC (ref 22.1–32)
BUN SERPL-MCNC: 4 MG/DL (ref 6–20)
BUN/CREAT SERPL: 6 (ref 12–20)
CALCIUM SERPL-MCNC: 8.2 MG/DL (ref 8.5–10.1)
CHLORIDE SERPL-SCNC: 104 MMOL/L (ref 97–108)
CO2 SERPL-SCNC: 29 MMOL/L (ref 21–32)
CREAT SERPL-MCNC: 0.62 MG/DL (ref 0.7–1.3)
ERYTHROCYTE [DISTWIDTH] IN BLOOD BY AUTOMATED COUNT: 19 % (ref 11.5–14.5)
GLUCOSE SERPL-MCNC: 89 MG/DL (ref 65–100)
HCT VFR BLD AUTO: 23.9 % (ref 36.6–50.3)
HGB BLD-MCNC: 7.8 G/DL (ref 12.1–17)
INR PPP: 1.2 (ref 0.9–1.1)
MAGNESIUM SERPL-MCNC: 1.6 MG/DL (ref 1.6–2.4)
MCH RBC QN AUTO: 34.1 PG (ref 26–34)
MCHC RBC AUTO-ENTMCNC: 32.6 G/DL (ref 30–36.5)
MCV RBC AUTO: 104.4 FL (ref 80–99)
NRBC # BLD: 0 K/UL (ref 0–0.01)
NRBC BLD-RTO: 0 PER 100 WBC
PHOSPHATE SERPL-MCNC: 3 MG/DL (ref 2.6–4.7)
PLATELET # BLD AUTO: 284 K/UL (ref 150–400)
PMV BLD AUTO: 9.8 FL (ref 8.9–12.9)
POTASSIUM SERPL-SCNC: 3.6 MMOL/L (ref 3.5–5.1)
PROTHROMBIN TIME: 11.8 SEC (ref 9–11.1)
RBC # BLD AUTO: 2.29 M/UL (ref 4.1–5.7)
SODIUM SERPL-SCNC: 140 MMOL/L (ref 136–145)
THERAPEUTIC RANGE,PTTT: ABNORMAL SECS (ref 58–77)
WBC # BLD AUTO: 10.3 K/UL (ref 4.1–11.1)

## 2019-10-05 PROCEDURE — 85730 THROMBOPLASTIN TIME PARTIAL: CPT

## 2019-10-05 PROCEDURE — 74011250637 HC RX REV CODE- 250/637: Performed by: HOSPITALIST

## 2019-10-05 PROCEDURE — 74011250637 HC RX REV CODE- 250/637: Performed by: INTERNAL MEDICINE

## 2019-10-05 PROCEDURE — 74011250637 HC RX REV CODE- 250/637: Performed by: NURSE PRACTITIONER

## 2019-10-05 PROCEDURE — 74011250636 HC RX REV CODE- 250/636: Performed by: NURSE PRACTITIONER

## 2019-10-05 PROCEDURE — 80048 BASIC METABOLIC PNL TOTAL CA: CPT

## 2019-10-05 PROCEDURE — 65660000000 HC RM CCU STEPDOWN

## 2019-10-05 PROCEDURE — 36415 COLL VENOUS BLD VENIPUNCTURE: CPT

## 2019-10-05 PROCEDURE — 84100 ASSAY OF PHOSPHORUS: CPT

## 2019-10-05 PROCEDURE — 83735 ASSAY OF MAGNESIUM: CPT

## 2019-10-05 PROCEDURE — 74011250637 HC RX REV CODE- 250/637: Performed by: PSYCHIATRY & NEUROLOGY

## 2019-10-05 PROCEDURE — 85610 PROTHROMBIN TIME: CPT

## 2019-10-05 PROCEDURE — 85027 COMPLETE CBC AUTOMATED: CPT

## 2019-10-05 RX ADMIN — ASPIRIN 81 MG CHEWABLE TABLET 81 MG: 81 TABLET CHEWABLE at 08:51

## 2019-10-05 RX ADMIN — HEPARIN SODIUM AND DEXTROSE 25 UNITS/KG/HR: 10000; 5 INJECTION INTRAVENOUS at 21:56

## 2019-10-05 RX ADMIN — HEPARIN SODIUM AND DEXTROSE 25 UNITS/KG/HR: 10000; 5 INJECTION INTRAVENOUS at 08:49

## 2019-10-05 RX ADMIN — ATORVASTATIN CALCIUM 80 MG: 40 TABLET, FILM COATED ORAL at 08:51

## 2019-10-05 RX ADMIN — PANTOPRAZOLE SODIUM 40 MG: 40 TABLET, DELAYED RELEASE ORAL at 08:51

## 2019-10-05 RX ADMIN — WARFARIN SODIUM 7.5 MG: 2.5 TABLET ORAL at 17:13

## 2019-10-05 NOTE — PROGRESS NOTES
Hospitalist Progress Note  Phillip Boyer MD  Answering service: [phone] from in house phone        Date of Service:  10/5/2019  NAME:  Ang Mobley  :  1955  MRN:  [id number]      Admission Summary: This is a 61-year-old  male with past medical history significant for bilateral lower extremity DVT and bilateral PE, on Coumadin, history of kidney stones, history of alcohol abuse and tobacco abuse, presented to [address] [place] [address] Emergency Department with left hand weakness that started this morning quarter to 07:00 a.m. He stated that he had left hand weakness around 04:00 p.m. yesterday, and the weakness lasted for 3 hours and went away. This morning quarter to 07:00 a.m., he said he felt like someone put pressure on his left arm. He could not make a fist and decided to come to [address] [place] [address] Emergency Department. No headache, fever, chills, sweating, left-sided chest pain, palpitation, shortness of breath, cough, abdominal pain, urinary complaint, or lower extremity weakness or swelling. No abnormal bowel movement. He took his Coumadin at 6 a.m. this morning. Interval history / Subjective:   Patient seen and evaluated. He was admitted for weakness left hand weakness. CTA showed significant right carotid artery disease. He is still on heparin drip. Neurointervention consulted. Patient says that the weakness has improved and his  is better right hand. No major event being reported by nurse. Assessment & Plan:       Symptomatic right carotid thrombus with subsequent small infarcts    -Pt presented with left hand weakness and CTA showed large wall adherent fibrofatty plaque/thrombus in the distal right common carotid artery extending to the bifurcation.  -Neurointervention consulted. Pt is on heparin drip. Will continue to follow recommendations.  -On statin. -ECHO c/w EF 56 - 60%.  No regional wall motion abnormality noted.  -Educated about stroke  -As per neurology  continue on heparin drip for now,then transitions to PO AC based on neurointervention recommendations. Started coumadin 10/3  consult pharmacy for dosing   physical therapy. Goal of INR 2-3,should have overlap 1-2 days of heparin drip when INR reach 2. -d/w Neurologist and NIS Dr. Alissa Pena, per NIS, consider repeat CTA of head/neck in a few days ( Monday) when pt remain on therepeutic heparin drip     Anemia:   Gradually drop Hb  Admission Hb 8.1, Hb was 10.7 on 9/30. Today 7.8. Start protonix. Check Stool OB. May transfuse if Hb< 7. Hypokalemia. -Replaced and corrected. -Mg 1.8  -Continue to monitor lab    Hypophosphatemia.   -On Neutra-Phos. am labs     Leukocytosis, 12.1 10/3   -The patient is afebrile, no signs or symptoms  of infection. -MONITOR     H/o  bilateral large PE and bilateral lower extremity DVT, on 08/08/2018    -Pt took Coumadin on outpatient. Difficult to tell if he was compliant being an alcohol and not knowing when the last time he took it. On  Heparin, pharmacy consulted for coumadin dosing    6) Tobacco abuse.    -The patient is advised to stop smoking. 7) H/o alcohol abuse  -Thiamine,folic acid po     Code status:full code  DVT prophylaxis:on heparin drip   Care Plan discussed with: Patient/Family  Disposition: TBD     Hospital Problems  Date Reviewed: 8/8/2018          Codes Class Noted POA    Carotid artery embolus, right ICD-10-CM: I65.21  ICD-9-CM: 433.10  10/2/2019 Unknown        * (Principal) Carotid artery thrombosis, right ICD-10-CM: I65.21  ICD-9-CM: 433.10  10/1/2019 Unknown        Stroke due to embolism [facility]) ICD-10-CM: I63.9  ICD-9-CM: 434.11  10/1/2019 Unknown        Left hand weakness ICD-10-CM: R29.898  ICD-9-CM: 728.87  9/30/2019 Unknown                Review of Systems:   A comprehensive review of systems was negative except for that written in the HPI.        Vital Signs:    Last 24hrs VS reviewed since prior progress note. Most recent are:  Visit Vitals  /70 (BP 1 Location: Right arm, BP Patient Position: At rest)   Pulse 62   Temp 98 °F (36.7 °C)   Resp 14   Ht 5' 10\" (1.778 m)   Wt 86.2 kg (190 lb 0.6 oz)   SpO2 98%   BMI 27.27 kg/m²       No intake or output data in the 24 hours ending 10/05/19 1607     Physical Examination:         Constitutional:  No acute distress, cooperative, pleasant    ENT:  Oral mucous moist, oropharynx benign. Resp:  CTA bilaterally. No wheezing/rhonchi/rales. No accessory muscle use   CV:  Regular rhythm, normal rate, no murmurs, gallops, rubs    GI:  Soft, non distended, non tender. normoactive bowel sounds, no hepatosplenomegaly     Musculoskeletal:  No edema, warm, 2+ pulses throughout    Neurologic:  Moves all extremities. Good power both upper extremities. AAOx3. Psych:  Good insight, Not anxious nor agitated. Data Review:    Review and/or order of clinical lab test      Labs:     Recent Labs     10/05/19  0127 10/04/19  0127   WBC 10.3 10.9   HGB 7.8* 7.7*   HCT 23.9* 23.5*    270     Recent Labs     10/05/19  0127 10/04/19  0127 10/03/19  0132 10/02/19  1615    140 135* 133*   K 3.6 3.0* 2.4* 3.0*    99 92* 92*   CO2 29 34* 36* 29   BUN 4* 4* 5* 5*   CREA 0.62* 0.60* 0.83 0.79   GLU 89 104* 163* 125*   CA 8.2* 8.0* 8.1* 8.7   MG 1.6 1.9 1.8  --    PHOS 3.0 2.9 2.5*  --    URICA  --   --   --  6.5     No results for input(s): SGOT, GPT, ALT, AP, TBIL, TBILI, TP, ALB, GLOB, GGT, AML, LPSE in the last 72 hours. No lab exists for component: AMYP, HLPSE  Recent Labs     10/05/19  0127 10/04/19  0127 10/03/19  1925  10/03/19  0132   INR 1.2* 1.2*  --   --  1.2*   PTP 11.8* 11.7*  --   --  12.2*   APTT 68.6* 65.3* 58.1*   < > 60.6*    < > = values in this interval not displayed. No results for input(s): FE, TIBC, PSAT, FERR in the last 72 hours.    Lab Results   Component Value Date/Time    Folate 4.4 (L) 08/09/2018 03:24 AM      No results for input(s): PH, PCO2, PO2 in the last 72 hours. No results for input(s): CPK, CKNDX, TROIQ in the last 72 hours.     No lab exists for component: CPKMB  Lab Results   Component Value Date/Time    Cholesterol, total 94 10/01/2019 03:50 AM    HDL Cholesterol 37 10/01/2019 03:50 AM    LDL, calculated 44.6 10/01/2019 03:50 AM    Triglyceride 62 10/01/2019 03:50 AM    CHOL/HDL Ratio 2.5 10/01/2019 03:50 AM     Lab Results   Component Value Date/Time    Glucose (POC) 138 (H) 09/30/2019 09:55 AM     Lab Results   Component Value Date/Time    Color YELLOW/STRAW 08/27/2015 05:27 PM    Appearance CLEAR 08/27/2015 05:27 PM    Specific gravity 1.017 08/27/2015 05:27 PM    pH (UA) 7.0 08/27/2015 05:27 PM    Protein NEGATIVE  08/27/2015 05:27 PM    Glucose NEGATIVE  08/27/2015 05:27 PM    Ketone 40 (A) 08/27/2015 05:27 PM    Bilirubin NEGATIVE  08/27/2015 05:27 PM    Urobilinogen 0.2 08/27/2015 05:27 PM    Nitrites NEGATIVE  08/27/2015 05:27 PM    Leukocyte Esterase NEGATIVE  08/27/2015 05:27 PM    Epithelial cells FEW 08/27/2015 05:27 PM    Bacteria NEGATIVE  08/27/2015 05:27 PM    WBC 0-4 08/27/2015 05:27 PM    RBC 0-5 08/27/2015 05:27 PM         Medications Reviewed:     Current Facility-Administered Medications   Medication Dose Route Frequency    warfarin (COUMADIN) tablet 7.5 mg  7.5 mg Oral ONCE    pantoprazole (PROTONIX) tablet 40 mg  40 mg Oral DAILY    Warfarin - pharmacy to dose   Other Rx Dosing/Monitoring    aspirin chewable tablet 81 mg  81 mg Oral DAILY    atorvastatin (LIPITOR) tablet 80 mg  80 mg Oral DAILY    heparin 25,000 units in D5W 250 ml infusion  12-25 Units/kg/hr IntraVENous TITRATE    acetaminophen (TYLENOL) tablet 650 mg  650 mg Oral Q4H PRN    Or    acetaminophen (TYLENOL) solution 650 mg  650 mg Per NG tube Q4H PRN    Or    acetaminophen (TYLENOL) suppository 650 mg  650 mg Rectal Q4H PRN     ______________________________________________________________________  EXPECTED LENGTH OF STAY: 2d 2h  ACTUAL LENGTH OF STAY:          3                 Monique Amador MD

## 2019-10-05 NOTE — ROUTINE PROCESS
Bedside shift change report given to [address] [place] [address] (oncoming nurse) by Gianni Weldon RN (offgoing nurse). Report included the following information SBAR, Kardex, ED Summary, Procedure Summary, Intake/Output, MAR, Accordion, Recent Results and Cardiac Rhythm NSR.

## 2019-10-05 NOTE — PROGRESS NOTES
Problem: Pain  Goal: *Control of Pain  Outcome: Progressing Towards Goal     Problem: Falls - Risk of  Goal: *Absence of Falls  Description  Document Jose Luis Yates Fall Risk and appropriate interventions in the flowsheet.   Outcome: Progressing Towards Goal  Note:   Fall Risk Interventions:  Mobility Interventions: Assess mobility with egress test, Communicate number of staff needed for ambulation/transfer, Patient to call before getting OOB, PT Consult for mobility concerns, PT Consult for assist device competence, Strengthening exercises (ROM-active/passive), Utilize walker, cane, or other assistive device         Medication Interventions: Assess postural VS orthostatic hypotension, Evaluate medications/consider consulting pharmacy, Patient to call before getting OOB, Teach patient to arise slowly    Elimination Interventions: Bed/chair exit alarm, Call light in reach, Patient to call for help with toileting needs, Stay With Me (per policy), Toilet paper/wipes in reach, Toileting schedule/hourly rounds              Problem: TIA/CVA Stroke: Day 2 Until Discharge  Goal: Activity/Safety  Outcome: Progressing Towards Goal  Goal: Diagnostic Test/Procedures  Outcome: Progressing Towards Goal  Goal: Nutrition/Diet  Outcome: Progressing Towards Goal  Goal: Discharge Planning  Outcome: Progressing Towards Goal  Goal: Medications  Outcome: Progressing Towards Goal  Goal: Respiratory  Outcome: Progressing Towards Goal  Goal: Treatments/Interventions/Procedures  Outcome: Progressing Towards Goal  Goal: Psychosocial  Outcome: Progressing Towards Goal  Goal: *Verbalizes anxiety and depression are reduced or absent  Outcome: Progressing Towards Goal  Goal: *Absence of aspiration  Outcome: Progressing Towards Goal  Goal: *Absence of deep venous thrombosis signs and symptoms(Stroke Metric)  Outcome: Progressing Towards Goal  Goal: *Optimal pain control at patient's stated goal  Outcome: Progressing Towards Goal  Goal: *Tolerating diet  Outcome: Progressing Towards Goal  Goal: *Ability to perform ADLs and demonstrates progressive mobility and function  Outcome: Progressing Towards Goal  Goal: *Stroke education continued(Stroke Metric)  Outcome: Progressing Towards Goal

## 2019-10-05 NOTE — PROGRESS NOTES
Problem: Falls - Risk of  Goal: *Absence of Falls  Description  Document Raissa Singh Fall Risk and appropriate interventions in the flowsheet.   Outcome: Progressing Towards Goal  Note:   Fall Risk Interventions:  Mobility Interventions: Communicate number of staff needed for ambulation/transfer, OT consult for ADLs, Patient to call before getting OOB, PT Consult for mobility concerns, PT Consult for assist device competence, Strengthening exercises (ROM-active/passive)         Medication Interventions: Evaluate medications/consider consulting pharmacy, Patient to call before getting OOB, Teach patient to arise slowly    Elimination Interventions: Call light in reach, Elevated toilet seat, Patient to call for help with toileting needs, Stay With Me (per policy)              Problem: TIA/CVA Stroke: 0-24 hours  Goal: Activity/Safety  Outcome: Progressing Towards Goal  Goal: Consults, if ordered  Outcome: Progressing Towards Goal  Goal: Diagnostic Test/Procedures  Outcome: Progressing Towards Goal  Goal: Nutrition/Diet  Outcome: Progressing Towards Goal  Goal: Discharge Planning  Outcome: Progressing Towards Goal  Goal: Medications  Outcome: Progressing Towards Goal  Goal: Respiratory  Outcome: Progressing Towards Goal  Goal: Treatments/Interventions/Procedures  Outcome: Progressing Towards Goal  Goal: Minimize risk of bleeding post-thrombolytic infusion  Outcome: Progressing Towards Goal  Goal: Monitor for complications post-thrombolytic infusion  Outcome: Progressing Towards Goal  Goal: Psychosocial  Outcome: Progressing Towards Goal  Goal: *Hemodynamically stable  Outcome: Progressing Towards Goal  Goal: *Neurologically stable  Description  Absence of additional neurological deficits    Outcome: Progressing Towards Goal  Goal: *Verbalizes anxiety and depression are reduced or absent  Outcome: Progressing Towards Goal  Goal: *Absence of Signs of Aspiration on Current Diet  Outcome: Progressing Towards Goal  Goal: *Absence of deep venous thrombosis signs and symptoms(Stroke Metric)  Outcome: Progressing Towards Goal  Goal: *Ability to perform ADLs and demonstrates progressive mobility and function  Outcome: Progressing Towards Goal

## 2019-10-05 NOTE — PROGRESS NOTES
Bedside shift change report given to Vianey Miller RN (oncoming nurse) by Laura Duckworth RN (offgoing nurse).  Report included the following information SBAR, Kardex, ED Summary, Procedure Summary, Intake/Output, MAR, Accordion, Recent Results and Cardiac Rhythm SR.

## 2019-10-06 LAB
ANION GAP SERPL CALC-SCNC: 5 MMOL/L (ref 5–15)
APTT PPP: 51 SEC (ref 22.1–32)
APTT PPP: 71 SEC (ref 22.1–32)
APTT PPP: 96.8 SEC (ref 22.1–32)
BASOPHILS # BLD: 0 K/UL (ref 0–0.1)
BASOPHILS NFR BLD: 0 % (ref 0–1)
BUN SERPL-MCNC: 5 MG/DL (ref 6–20)
BUN/CREAT SERPL: 7 (ref 12–20)
CALCIUM SERPL-MCNC: 8.7 MG/DL (ref 8.5–10.1)
CHLORIDE SERPL-SCNC: 105 MMOL/L (ref 97–108)
CO2 SERPL-SCNC: 29 MMOL/L (ref 21–32)
CREAT SERPL-MCNC: 0.76 MG/DL (ref 0.7–1.3)
DIFFERENTIAL METHOD BLD: ABNORMAL
EOSINOPHIL # BLD: 0 K/UL (ref 0–0.4)
EOSINOPHIL NFR BLD: 0 % (ref 0–7)
ERYTHROCYTE [DISTWIDTH] IN BLOOD BY AUTOMATED COUNT: 19.1 % (ref 11.5–14.5)
GLUCOSE SERPL-MCNC: 108 MG/DL (ref 65–100)
HCT VFR BLD AUTO: 25.5 % (ref 36.6–50.3)
HGB BLD-MCNC: 8.1 G/DL (ref 12.1–17)
IMM GRANULOCYTES # BLD AUTO: 0 K/UL
IMM GRANULOCYTES NFR BLD AUTO: 0 %
INR PPP: 1.2 (ref 0.9–1.1)
LYMPHOCYTES # BLD: 3.6 K/UL (ref 0.8–3.5)
LYMPHOCYTES NFR BLD: 34 % (ref 12–49)
MCH RBC QN AUTO: 33.3 PG (ref 26–34)
MCHC RBC AUTO-ENTMCNC: 31.8 G/DL (ref 30–36.5)
MCV RBC AUTO: 104.9 FL (ref 80–99)
MONOCYTES # BLD: 0.4 K/UL (ref 0–1)
MONOCYTES NFR BLD: 4 % (ref 5–13)
NEUTS SEG # BLD: 6.6 K/UL (ref 1.8–8)
NEUTS SEG NFR BLD: 62 % (ref 32–75)
NRBC # BLD: 0 K/UL (ref 0–0.01)
NRBC BLD-RTO: 0 PER 100 WBC
PLATELET # BLD AUTO: 298 K/UL (ref 150–400)
PMV BLD AUTO: 9.5 FL (ref 8.9–12.9)
POTASSIUM SERPL-SCNC: 3.9 MMOL/L (ref 3.5–5.1)
PROT C AG PPP IA-ACNC: 53 % (ref 60–150)
PROTHROMBIN TIME: 11.9 SEC (ref 9–11.1)
RBC # BLD AUTO: 2.43 M/UL (ref 4.1–5.7)
RBC MORPH BLD: ABNORMAL
SODIUM SERPL-SCNC: 139 MMOL/L (ref 136–145)
THERAPEUTIC RANGE,PTTT: ABNORMAL SECS (ref 58–77)
WBC # BLD AUTO: 10.6 K/UL (ref 4.1–11.1)

## 2019-10-06 PROCEDURE — 80048 BASIC METABOLIC PNL TOTAL CA: CPT

## 2019-10-06 PROCEDURE — 85025 COMPLETE CBC W/AUTO DIFF WBC: CPT

## 2019-10-06 PROCEDURE — 85730 THROMBOPLASTIN TIME PARTIAL: CPT

## 2019-10-06 PROCEDURE — 74011250637 HC RX REV CODE- 250/637: Performed by: NURSE PRACTITIONER

## 2019-10-06 PROCEDURE — 74011250637 HC RX REV CODE- 250/637: Performed by: PSYCHIATRY & NEUROLOGY

## 2019-10-06 PROCEDURE — 85610 PROTHROMBIN TIME: CPT

## 2019-10-06 PROCEDURE — 36415 COLL VENOUS BLD VENIPUNCTURE: CPT

## 2019-10-06 PROCEDURE — 74011250637 HC RX REV CODE- 250/637: Performed by: INTERNAL MEDICINE

## 2019-10-06 PROCEDURE — 74011250636 HC RX REV CODE- 250/636: Performed by: NURSE PRACTITIONER

## 2019-10-06 PROCEDURE — 74011250637 HC RX REV CODE- 250/637: Performed by: HOSPITALIST

## 2019-10-06 PROCEDURE — 65660000000 HC RM CCU STEPDOWN

## 2019-10-06 RX ORDER — WARFARIN 7.5 MG/1
7.5 TABLET ORAL ONCE
Status: COMPLETED | OUTPATIENT
Start: 2019-10-06 | End: 2019-10-06

## 2019-10-06 RX ADMIN — ASPIRIN 81 MG CHEWABLE TABLET 81 MG: 81 TABLET CHEWABLE at 08:30

## 2019-10-06 RX ADMIN — HEPARIN SODIUM AND DEXTROSE 22 UNITS/KG/HR: 10000; 5 INJECTION INTRAVENOUS at 15:06

## 2019-10-06 RX ADMIN — ATORVASTATIN CALCIUM 80 MG: 40 TABLET, FILM COATED ORAL at 08:30

## 2019-10-06 RX ADMIN — PANTOPRAZOLE SODIUM 40 MG: 40 TABLET, DELAYED RELEASE ORAL at 08:30

## 2019-10-06 RX ADMIN — WARFARIN SODIUM 7.5 MG: 7.5 TABLET ORAL at 16:56

## 2019-10-06 NOTE — PROGRESS NOTES
Hospitalist Progress Note  Cassidy Barry MD  Answering service: [phone] from in house phone        Date of Service:  10/6/2019  NAME:  Angel Hahn  :  1955  MRN:  [id number]      Admission Summary: This is a 73-year-old  male with past medical history significant for bilateral lower extremity DVT and bilateral PE, on Coumadin, history of kidney stones, history of alcohol abuse and tobacco abuse, presented to [facility] Emergency Department with left hand weakness that started this morning quarter to 07:00 a.m. He stated that he had left hand weakness around 04:00 p.m. yesterday, and the weakness lasted for 3 hours and went away. This morning quarter to 07:00 a.m., he said he felt like someone put pressure on his left arm. He could not make a fist and decided to come to [facility] Emergency Department. No headache, fever, chills, sweating, left-sided chest pain, palpitation, shortness of breath, cough, abdominal pain, urinary complaint, or lower extremity weakness or swelling. No abnormal bowel movement. He took his Coumadin at 6 a.m. this morning. Interval history / Subjective:   Patient seen and evaluated. He was admitted for weakness left hand weakness. CTA showed significant right carotid artery disease. He is still on heparin drip. Neurointervention consulted. Patient says that the weakness has improved and his  is better right hand. No major event being reported by nurse. Assessment & Plan:       Symptomatic right carotid thrombus with subsequent small infarcts    -Pt presented with left hand weakness and CTA showed large wall adherent fibrofatty plaque/thrombus in the distal right common carotid artery extending to the bifurcation.  -Neurointervention consulted. Pt is on heparin drip. Will continue to follow recommendations.  -On statin. -ECHO c/w EF 56 - 60%.  No regional wall motion abnormality noted.  -Educated about stroke  -As per neurology  continue on heparin drip for now,then transitions to PO AC based on neurointervention recommendations. Started coumadin 10/3  consult pharmacy for dosing   physical therapy. Goal of INR 2-3, should have overlap 1-2 days of heparin drip when INR reach 2. -d/w Neurologist and NIS Dr. Leana Joaquin, per NIS, consider repeat CTA of head/neck in a few days ( Monday) when pt remain on therepeutic heparin drip   - Continue management as per neuro interventional surgery    Anemia:   Hb 8.1  Monitor      Hypokalemia. -Resolved    Hypophosphatemia.   -On Neutra-Phos. am labs     Leukocytosis, 12.1 10/3   -The patient is afebrile, no signs or symptoms  of infection. -MONITOR     H/o  bilateral large PE and bilateral lower extremity DVT, on 08/08/2018    -Pt took Coumadin on outpatient. Difficult to tell if he was compliant being an alcohol and not knowing when the last time he took it. On  Heparin, pharmacy consulted for coumadin dosing    6) Tobacco abuse.    -The patient is advised to stop smoking. 7) H/o alcohol abuse  -Thiamine,folic acid po     Code status:full code  DVT prophylaxis:on heparin drip   Care Plan discussed with: Patient/Family  Disposition: TBD     Hospital Problems  Date Reviewed: 8/8/2018          Codes Class Noted POA    Carotid artery embolus, right ICD-10-CM: I65.21  ICD-9-CM: 433.10  10/2/2019 Unknown        * (Principal) Carotid artery thrombosis, right ICD-10-CM: I65.21  ICD-9-CM: 433.10  10/1/2019 Unknown        Stroke due to embolism [facility]) ICD-10-CM: I63.9  ICD-9-CM: 434.11  10/1/2019 Unknown        Left hand weakness ICD-10-CM: R29.898  ICD-9-CM: 728.87  9/30/2019 Unknown                Review of Systems:   A comprehensive review of systems was negative except for that written in the HPI. Vital Signs:    Last 24hrs VS reviewed since prior progress note.  Most recent are:  Visit Vitals  /63 (BP 1 Location: Right arm, BP Patient Position: At rest)   Pulse 62   Temp 97.6 °F (36.4 °C)   Resp 15   Ht 5' 10\" (1.778 m)   Wt 86.5 kg (190 lb 11.2 oz)   SpO2 98%   BMI 27.36 kg/m²       No intake or output data in the 24 hours ending 10/06/19 1341     Physical Examination:         Constitutional:  No acute distress, cooperative, pleasant    ENT:  Oral mucous moist, oropharynx benign. Resp:  CTA bilaterally. No wheezing/rhonchi/rales. No accessory muscle use   CV:  Regular rhythm, normal rate, no murmurs, gallops, rubs    GI:  Soft, non distended, non tender. normoactive bowel sounds, no hepatosplenomegaly     Musculoskeletal:  No edema, warm, 2+ pulses throughout    Neurologic:  Moves all extremities. Good power both upper extremities. AAOx3. Psych:  Good insight, Not anxious nor agitated. Data Review:    Review and/or order of clinical lab test      Labs:     Recent Labs     10/06/19  0130 10/05/19  0127   WBC 10.6 10.3   HGB 8.1* 7.8*   HCT 25.5* 23.9*    284     Recent Labs     10/06/19  0130 10/05/19  0127 10/04/19  0127    140 140   K 3.9 3.6 3.0*    104 99   CO2 29 29 34*   BUN 5* 4* 4*   CREA 0.76 0.62* 0.60*   * 89 104*   CA 8.7 8.2* 8.0*   MG  --  1.6 1.9   PHOS  --  3.0 2.9     No results for input(s): SGOT, GPT, ALT, AP, TBIL, TBILI, TP, ALB, GLOB, GGT, AML, LPSE in the last 72 hours. No lab exists for component: AMYP, HLPSE  Recent Labs     10/06/19  1007 10/06/19  0130 10/05/19  0127 10/04/19  0127   INR  --  1.2* 1.2* 1.2*   PTP  --  11.9* 11.8* 11.7*   APTT 71.0* 96.8* 68.6* 65.3*      No results for input(s): FE, TIBC, PSAT, FERR in the last 72 hours. Lab Results   Component Value Date/Time    Folate 4.4 (L) 08/09/2018 03:24 AM      No results for input(s): PH, PCO2, PO2 in the last 72 hours. No results for input(s): CPK, CKNDX, TROIQ in the last 72 hours.     No lab exists for component: CPKMB  Lab Results   Component Value Date/Time    Cholesterol, total 94 10/01/2019 03:50 AM    HDL Cholesterol 37 10/01/2019 03:50 AM    LDL, calculated 44.6 10/01/2019 03:50 AM    Triglyceride 62 10/01/2019 03:50 AM    CHOL/HDL Ratio 2.5 10/01/2019 03:50 AM     Lab Results   Component Value Date/Time    Glucose (POC) 138 (H) 09/30/2019 09:55 AM     Lab Results   Component Value Date/Time    Color YELLOW/STRAW 08/27/2015 05:27 PM    Appearance CLEAR 08/27/2015 05:27 PM    Specific gravity 1.017 08/27/2015 05:27 PM    pH (UA) 7.0 08/27/2015 05:27 PM    Protein NEGATIVE  08/27/2015 05:27 PM    Glucose NEGATIVE  08/27/2015 05:27 PM    Ketone 40 (A) 08/27/2015 05:27 PM    Bilirubin NEGATIVE  08/27/2015 05:27 PM    Urobilinogen 0.2 08/27/2015 05:27 PM    Nitrites NEGATIVE  08/27/2015 05:27 PM    Leukocyte Esterase NEGATIVE  08/27/2015 05:27 PM    Epithelial cells FEW 08/27/2015 05:27 PM    Bacteria NEGATIVE  08/27/2015 05:27 PM    WBC 0-4 08/27/2015 05:27 PM    RBC 0-5 08/27/2015 05:27 PM         Medications Reviewed:     Current Facility-Administered Medications   Medication Dose Route Frequency    warfarin (COUMADIN) tablet 7.5 mg  7.5 mg Oral ONCE    pantoprazole (PROTONIX) tablet 40 mg  40 mg Oral DAILY    Warfarin - pharmacy to dose   Other Rx Dosing/Monitoring    aspirin chewable tablet 81 mg  81 mg Oral DAILY    atorvastatin (LIPITOR) tablet 80 mg  80 mg Oral DAILY    heparin 25,000 units in D5W 250 ml infusion  12-25 Units/kg/hr IntraVENous TITRATE    acetaminophen (TYLENOL) tablet 650 mg  650 mg Oral Q4H PRN    Or    acetaminophen (TYLENOL) solution 650 mg  650 mg Per NG tube Q4H PRN    Or    acetaminophen (TYLENOL) suppository 650 mg  650 mg Rectal Q4H PRN     ______________________________________________________________________  EXPECTED LENGTH OF STAY: 2d 2h  ACTUAL LENGTH OF STAY:          4                 Lorren Tatiana, MD

## 2019-10-06 NOTE — PROGRESS NOTES
Problem: Pain  Goal: *Control of Pain  Outcome: Progressing Towards Goal     Problem: Falls - Risk of  Goal: *Absence of Falls  Description  Document Michael Dobson Fall Risk and appropriate interventions in the flowsheet.   Outcome: Progressing Towards Goal  Note:   Fall Risk Interventions:  Mobility Interventions: Communicate number of staff needed for ambulation/transfer, Patient to call before getting OOB, PT Consult for mobility concerns, PT Consult for assist device competence         Medication Interventions: Assess postural VS orthostatic hypotension, Evaluate medications/consider consulting pharmacy, Patient to call before getting OOB, Teach patient to arise slowly    Elimination Interventions: Call light in reach, Patient to call for help with toileting needs, Stay With Me (per policy), Toilet paper/wipes in reach, Toileting schedule/hourly rounds              Problem: TIA/CVA Stroke: Day 2 Until Discharge  Goal: Activity/Safety  Outcome: Progressing Towards Goal  Goal: Diagnostic Test/Procedures  Outcome: Progressing Towards Goal  Goal: Nutrition/Diet  Outcome: Progressing Towards Goal  Goal: Discharge Planning  Outcome: Progressing Towards Goal  Goal: Medications  Outcome: Progressing Towards Goal  Goal: Respiratory  Outcome: Progressing Towards Goal  Goal: Treatments/Interventions/Procedures  Outcome: Progressing Towards Goal  Goal: *Absence of aspiration  Outcome: Progressing Towards Goal  Goal: *Absence of deep venous thrombosis signs and symptoms(Stroke Metric)  Outcome: Progressing Towards Goal  Goal: *Optimal pain control at patient's stated goal  Outcome: Progressing Towards Goal  Goal: *Tolerating diet  Outcome: Progressing Towards Goal  Goal: *Ability to perform ADLs and demonstrates progressive mobility and function  Outcome: Progressing Towards Goal  Goal: *Stroke education continued(Stroke Metric)  Outcome: Progressing Towards Goal

## 2019-10-06 NOTE — PROGRESS NOTES
Pharmacist Note - Warfarin Dosing  Consult provided for this 60 y.o.male to manage warfarin for right carotid artery thrombus  -recent history bilateral PE and LE DVT. INR Goal: 2 - 3    Home regimen/ tablet size: 5 mg every day (patient unsure/not compliant). 9/10/19 Eliquis changed back to warfarin 2/2 lack of insurance    Drugs that may increase INR: None  Drugs that may decrease INR: None  Other current anticoagulants/ drugs that may increase bleeding risk: Aspirin and Heparin  Risk factors: None  Daily INR ordered: YES    Recent Labs     10/06/19  0130 10/05/19  0127 10/04/19  0127   HGB 8.1* 7.8* 7.7*   INR 1.2* 1.2* 1.2*     Date               INR                  Dose  10/3  1.2  5 mg   10/4  1.2  5 mg  10/5  1.2  7.5 mg  10/6  1.2  7.5 mg                                                                                  Assessment/ Plan: Will order warfarin 7.5 mg PO x 1 dose. Pharmacy will continue to monitor daily and adjust therapy as indicated. Please contact the pharmacist at d [phone] or  for outpatient recommendations if needed.

## 2019-10-06 NOTE — PROGRESS NOTES
Bedside shift change report given to Vianey Miller RN (oncoming nurse) by Collette Pleasant, RN (offgoing nurse).  Report included the following information SBAR, Kardex, ED Summary, Procedure Summary, Intake/Output, MAR, Accordion, Recent Results and Cardiac Rhythm SR.

## 2019-10-06 NOTE — ROUTINE PROCESS
Bedside shift change report given to [address] [place] [id number] (oncoming nurse) by Vilma Wilder RN (offgoing nurse). Report included the following information SBAR, Kardex, ED Summary, Procedure Summary, Intake/Output, MAR, Recent Results and Cardiac Rhythm NSR/SB.

## 2019-10-06 NOTE — PROGRESS NOTES
Problem: Falls - Risk of  Goal: *Absence of Falls  Description  Document Donata Carrel Fall Risk and appropriate interventions in the flowsheet.   Outcome: Progressing Towards Goal  Note:   Fall Risk Interventions:  Mobility Interventions: Communicate number of staff needed for ambulation/transfer, Patient to call before getting OOB, PT Consult for mobility concerns, PT Consult for assist device competence         Medication Interventions: Evaluate medications/consider consulting pharmacy, Patient to call before getting OOB, Teach patient to arise slowly    Elimination Interventions: Call light in reach, Elevated toilet seat, Patient to call for help with toileting needs, Stay With Me (per policy), Toilet paper/wipes in reach              Problem: TIA/CVA Stroke: 0-24 hours  Goal: Activity/Safety  Outcome: Progressing Towards Goal  Goal: Consults, if ordered  Outcome: Progressing Towards Goal  Goal: Discharge Planning  Outcome: Progressing Towards Goal  Goal: Treatments/Interventions/Procedures  Outcome: Progressing Towards Goal  Goal: Minimize risk of bleeding post-thrombolytic infusion  Outcome: Progressing Towards Goal  Goal: Monitor for complications post-thrombolytic infusion  Outcome: Progressing Towards Goal  Goal: *Hemodynamically stable  Outcome: Progressing Towards Goal  Goal: *Neurologically stable  Description  Absence of additional neurological deficits    Outcome: Progressing Towards Goal  Goal: *Ability to perform ADLs and demonstrates progressive mobility and function  Outcome: Progressing Towards Goal     Problem: TIA/CVA Stroke: Day 2 Until Discharge  Goal: Diagnostic Test/Procedures  Outcome: Progressing Towards Goal  Goal: Nutrition/Diet  Outcome: Progressing Towards Goal  Goal: Discharge Planning  Outcome: Progressing Towards Goal     Problem: Ischemic Stroke: Discharge Outcomes  Goal: *Verbalize understanding of risk factor modification(Stroke Metric)  Outcome: Progressing Towards Goal  Goal: *Hemodynamically stable  Outcome: Progressing Towards Goal  Goal: *Tolerating diet  Outcome: Progressing Towards Goal  Goal: *Absence of DVT(Stroke Metric)  Outcome: Progressing Towards Goal  Goal: *Absence of aspiration  Outcome: Progressing Towards Goal  Goal: *Understands and describes signs and symptoms to report to providers(Stroke Metric)  Outcome: Progressing Towards Goal  Goal: *Smoking cessation discussed,if applicable(Stroke Metric)  Outcome: Progressing Towards Goal

## 2019-10-06 NOTE — PROGRESS NOTES
IV heparin rate has been adjusted based on the most recent PTT results. Lab Results   Component Value Date/Time    aPTT 71.0 (H) 10/06/2019 10:07 AM     No rate change. Will recheck PTT in 6 hours (1700) per heparin gtt protocol.

## 2019-10-06 NOTE — PROGRESS NOTES
IV heparin rate has been adjusted based on the most recent PTT results. Lab Results   Component Value Date/Time    aPTT 51.0 (H) 10/06/2019 04:58 PM     Increased rate by 1unit/kg/hr, and will recheck PTT in 6 hours (0000) per heparin gtt protocol.

## 2019-10-07 ENCOUNTER — APPOINTMENT (OUTPATIENT)
Dept: CT IMAGING | Age: 64
DRG: 045 | End: 2019-10-07
Attending: HOSPITALIST
Payer: MEDICAID

## 2019-10-07 LAB
APTT PPP: 71.5 SEC (ref 22.1–32)
APTT PPP: 77.2 SEC (ref 22.1–32)
F2 GENE MUT ANL BLD/T: NORMAL
INR PPP: 1.3 (ref 0.9–1.1)
PROTHROMBIN TIME: 12.8 SEC (ref 9–11.1)
THERAPEUTIC RANGE,PTTT: ABNORMAL SECS (ref 58–77)
THERAPEUTIC RANGE,PTTT: ABNORMAL SECS (ref 58–77)

## 2019-10-07 PROCEDURE — 36415 COLL VENOUS BLD VENIPUNCTURE: CPT

## 2019-10-07 PROCEDURE — 97535 SELF CARE MNGMENT TRAINING: CPT

## 2019-10-07 PROCEDURE — 70498 CT ANGIOGRAPHY NECK: CPT

## 2019-10-07 PROCEDURE — 85730 THROMBOPLASTIN TIME PARTIAL: CPT

## 2019-10-07 PROCEDURE — 70496 CT ANGIOGRAPHY HEAD: CPT

## 2019-10-07 PROCEDURE — 97116 GAIT TRAINING THERAPY: CPT

## 2019-10-07 PROCEDURE — 74011000258 HC RX REV CODE- 258: Performed by: RADIOLOGY

## 2019-10-07 PROCEDURE — 74011636320 HC RX REV CODE- 636/320: Performed by: RADIOLOGY

## 2019-10-07 PROCEDURE — 74011250637 HC RX REV CODE- 250/637: Performed by: NURSE PRACTITIONER

## 2019-10-07 PROCEDURE — 74011250637 HC RX REV CODE- 250/637: Performed by: INTERNAL MEDICINE

## 2019-10-07 PROCEDURE — 65660000000 HC RM CCU STEPDOWN

## 2019-10-07 PROCEDURE — 74011250637 HC RX REV CODE- 250/637: Performed by: HOSPITALIST

## 2019-10-07 PROCEDURE — 74011250637 HC RX REV CODE- 250/637: Performed by: PSYCHIATRY & NEUROLOGY

## 2019-10-07 PROCEDURE — 85610 PROTHROMBIN TIME: CPT

## 2019-10-07 PROCEDURE — 74011250636 HC RX REV CODE- 250/636: Performed by: NURSE PRACTITIONER

## 2019-10-07 RX ORDER — SODIUM CHLORIDE 0.9 % (FLUSH) 0.9 %
10 SYRINGE (ML) INJECTION
Status: COMPLETED | OUTPATIENT
Start: 2019-10-07 | End: 2019-10-07

## 2019-10-07 RX ADMIN — ASPIRIN 81 MG CHEWABLE TABLET 81 MG: 81 TABLET CHEWABLE at 08:57

## 2019-10-07 RX ADMIN — SODIUM CHLORIDE 100 ML: 900 INJECTION, SOLUTION INTRAVENOUS at 14:20

## 2019-10-07 RX ADMIN — WARFARIN SODIUM 9 MG: 5 TABLET ORAL at 17:04

## 2019-10-07 RX ADMIN — HEPARIN SODIUM AND DEXTROSE 23 UNITS/KG/HR: 10000; 5 INJECTION INTRAVENOUS at 17:05

## 2019-10-07 RX ADMIN — Medication 10 ML: at 14:20

## 2019-10-07 RX ADMIN — HEPARIN SODIUM AND DEXTROSE 23 UNITS/KG/HR: 10000; 5 INJECTION INTRAVENOUS at 04:01

## 2019-10-07 RX ADMIN — PANTOPRAZOLE SODIUM 40 MG: 40 TABLET, DELAYED RELEASE ORAL at 08:57

## 2019-10-07 RX ADMIN — ATORVASTATIN CALCIUM 80 MG: 40 TABLET, FILM COATED ORAL at 08:57

## 2019-10-07 RX ADMIN — IOPAMIDOL 100 ML: 755 INJECTION, SOLUTION INTRAVENOUS at 14:19

## 2019-10-07 NOTE — PROGRESS NOTES
Hospitalist Progress Note  Monica Espana MD  Answering service: [phone] from in house phone        Date of Service:  10/7/2019  NAME:  Jacobo Moura  :  1955  MRN:  [id number]      Admission Summary: This is a 82-year-old  male with past medical history significant for bilateral lower extremity DVT and bilateral PE, on Coumadin, history of kidney stones, history of alcohol abuse and tobacco abuse, presented to [facility] Emergency Department with left hand weakness that started this morning quarter to 07:00 a.m. He stated that he had left hand weakness around 04:00 p.m. yesterday, and the weakness lasted for 3 hours and went away. This morning quarter to 07:00 a.m., he said he felt like someone put pressure on his left arm. He could not make a fist and decided to come to [facility] Emergency Department. No headache, fever, chills, sweating, left-sided chest pain, palpitation, shortness of breath, cough, abdominal pain, urinary complaint, or lower extremity weakness or swelling. No abnormal bowel movement. He took his Coumadin at 6 a.m. this morning. Interval history / Subjective:   Patient seen and evaluated. He was admitted for weakness left hand weakness. CTA showed significant right carotid artery disease. He is still on heparin drip. Neurointervention consulted. Patient says that the weakness has improved and his  is better right hand. No major event being reported by nurse. Assessment & Plan:       Symptomatic right carotid thrombus with subsequent small infarcts    -Pt presented with left hand weakness and CTA showed large wall adherent fibrofatty plaque/thrombus in the distal right common carotid artery extending to the bifurcation.  -Neurointervention consulted. Pt is on heparin drip. Will continue to follow recommendations.  -On statin. -ECHO c/w EF 56 - 60%.  No regional wall motion abnormality noted.  -Educated about stroke  -As per neurology  continue on heparin drip for now,then transitions to PO AC based on neurointervention recommendations. Started coumadin 10/3  consult pharmacy for dosing   physical therapy. Goal of INR 2-3, should have overlap 1-2 days of heparin drip when INR reach  Therapeutic.   -d/w Neurologist and [facility] Dr. Burgess Melendez, per [facility], consider repeat CTA of head/neck in a few days ( Monday) when pt remain on therepeutic heparin drip   - Continue management as per neuro interventional surgery  -repeat CTA of neck and head today, result pending   -dietician consult for his coumadin     Anemia:   Hb 8.1  Monitor      Hypokalemia. -Resolved    Hypophosphatemia.   -On Neutra-Phos. am labs     Leukocytosis, 12.1 10/3   -The patient is afebrile, no signs or symptoms  of infection. -MONITOR     H/o  bilateral large PE and bilateral lower extremity DVT, on 08/08/2018    -Pt took Coumadin on outpatient. Difficult to tell if he was compliant being an alcohol and not knowing when the last time he took it. On  Heparin, pharmacy consulted for coumadin dosing    6) Tobacco abuse.    -The patient is advised to stop smoking. 7) H/o alcohol abuse  -Thiamine,folic acid po     Code status:full code  DVT prophylaxis:on heparin drip   Care Plan discussed with: Patient/Family  Disposition: TBD, home when INR therapeutic.  Need a new PCP to follow up his INR      Hospital Problems  Date Reviewed: 8/8/2018          Codes Class Noted POA    Carotid artery embolus, right ICD-10-CM: I65.21  ICD-9-CM: 433.10  10/2/2019 Unknown        * (Principal) Carotid artery thrombosis, right ICD-10-CM: I65.21  ICD-9-CM: 433.10  10/1/2019 Unknown        Stroke due to embolism [facility]) ICD-10-CM: I63.9  ICD-9-CM: 434.11  10/1/2019 Unknown        Left hand weakness ICD-10-CM: R29.898  ICD-9-CM: 728.87  9/30/2019 Unknown                Review of Systems:   A comprehensive review of systems was negative except for that written in the HPI. Vital Signs:    Last 24hrs VS reviewed since prior progress note. Most recent are:  Visit Vitals  BP (!) 119/97 (BP 1 Location: Right arm, BP Patient Position: Sitting)   Pulse (!) 55   Temp 98.1 °F (36.7 °C)   Resp 14   Ht 5' 10\" (1.778 m)   Wt 86.2 kg (190 lb)   SpO2 99%   BMI 27.26 kg/m²         Intake/Output Summary (Last 24 hours) at 10/7/2019 1531  Last data filed at 10/7/2019 1201  Gross per 24 hour   Intake 2693.69 ml   Output    Net 2693.69 ml        Physical Examination:         Constitutional:  No acute distress, cooperative, pleasant    ENT:  Oral mucous moist, oropharynx benign. Resp:  CTA bilaterally. No wheezing/rhonchi/rales. No accessory muscle use   CV:  Regular rhythm, normal rate, no murmurs, gallops, rubs    GI:  Soft, non distended, non tender. normoactive bowel sounds, no hepatosplenomegaly     Musculoskeletal:  No edema, warm, 2+ pulses throughout    Neurologic:  Moves all extremities. Good power both upper extremities. AAOx3. Psych:  Good insight, Not anxious nor agitated. Data Review:    Review and/or order of clinical lab test      Labs:     Recent Labs     10/06/19  0130 10/05/19  0127   WBC 10.6 10.3   HGB 8.1* 7.8*   HCT 25.5* 23.9*    284     Recent Labs     10/06/19  0130 10/05/19  0127    140   K 3.9 3.6    104   CO2 29 29   BUN 5* 4*   CREA 0.76 0.62*   * 89   CA 8.7 8.2*   MG  --  1.6   PHOS  --  3.0     No results for input(s): SGOT, GPT, ALT, AP, TBIL, TBILI, TP, ALB, GLOB, GGT, AML, LPSE in the last 72 hours. No lab exists for component: AMYP, HLPSE  Recent Labs     10/07/19  0710 10/07/19  0100 10/06/19  1658  10/06/19  0130 10/05/19  0127   INR  --  1.3*  --   --  1.2* 1.2*   PTP  --  12.8*  --   --  11.9* 11.8*   APTT 71.5* 77.2* 51.0*   < > 96.8* 68.6*    < > = values in this interval not displayed. No results for input(s): FE, TIBC, PSAT, FERR in the last 72 hours.    Lab Results   Component Value Date/Time Folate 4.4 (L) 08/09/2018 03:24 AM      No results for input(s): PH, PCO2, PO2 in the last 72 hours. No results for input(s): CPK, CKNDX, TROIQ in the last 72 hours.     No lab exists for component: CPKMB  Lab Results   Component Value Date/Time    Cholesterol, total 94 10/01/2019 03:50 AM    HDL Cholesterol 37 10/01/2019 03:50 AM    LDL, calculated 44.6 10/01/2019 03:50 AM    Triglyceride 62 10/01/2019 03:50 AM    CHOL/HDL Ratio 2.5 10/01/2019 03:50 AM     Lab Results   Component Value Date/Time    Glucose (POC) 138 (H) 09/30/2019 09:55 AM     Lab Results   Component Value Date/Time    Color YELLOW/STRAW 08/27/2015 05:27 PM    Appearance CLEAR 08/27/2015 05:27 PM    Specific gravity 1.017 08/27/2015 05:27 PM    pH (UA) 7.0 08/27/2015 05:27 PM    Protein NEGATIVE  08/27/2015 05:27 PM    Glucose NEGATIVE  08/27/2015 05:27 PM    Ketone 40 (A) 08/27/2015 05:27 PM    Bilirubin NEGATIVE  08/27/2015 05:27 PM    Urobilinogen 0.2 08/27/2015 05:27 PM    Nitrites NEGATIVE  08/27/2015 05:27 PM    Leukocyte Esterase NEGATIVE  08/27/2015 05:27 PM    Epithelial cells FEW 08/27/2015 05:27 PM    Bacteria NEGATIVE  08/27/2015 05:27 PM    WBC 0-4 08/27/2015 05:27 PM    RBC 0-5 08/27/2015 05:27 PM         Medications Reviewed:     Current Facility-Administered Medications   Medication Dose Route Frequency    warfarin (COUMADIN) tablet 9 mg  9 mg Oral ONCE    pantoprazole (PROTONIX) tablet 40 mg  40 mg Oral DAILY    Warfarin - pharmacy to dose   Other Rx Dosing/Monitoring    aspirin chewable tablet 81 mg  81 mg Oral DAILY    atorvastatin (LIPITOR) tablet 80 mg  80 mg Oral DAILY    heparin 25,000 units in D5W 250 ml infusion  12-25 Units/kg/hr IntraVENous TITRATE    acetaminophen (TYLENOL) tablet 650 mg  650 mg Oral Q4H PRN    Or    acetaminophen (TYLENOL) solution 650 mg  650 mg Per NG tube Q4H PRN    Or    acetaminophen (TYLENOL) suppository 650 mg  650 mg Rectal Q4H PRN ______________________________________________________________________  EXPECTED LENGTH OF STAY: 2d 2h  ACTUAL LENGTH OF STAY:          5                 Jose Carlos Guardado MD

## 2019-10-07 NOTE — PROGRESS NOTES
Problem: Self Care Deficits Care Plan (Adult)  Goal: *Acute Goals and Plan of Care (Insert Text)  Description  FUNCTIONAL STATUS PRIOR TO ADMISSION: Patient was independent and active without use of DME.    HOME SUPPORT PRIOR TO ADMISSION: The patient lived with spouse but did not require assist. Pt indicated that spouse will not be at home upon DC. Anticipate pt will live at home, however will have friends for limited support. Occupational Therapy Goals  Initiated 10/3/2019     1. Pt will complete LB bathing with S within 7 days. 2. Pt will complete LB dressing with S within 7 days. 3. Pt will complete UB dressing with S within 7 days. 4. Pt will complete grooming/hygeine at sink with set-up/S within 7 days. 5. Pt will complete toilet transfer with S within 7 days. 6. Pt will complete toileting hygiene with S within 7 days. Outcome: Resolved/Met     OCCUPATIONAL THERAPY TREATMENT/DISCHARGE  Patient: Roxanna Sharp (11 y.o. male)  Date: 10/7/2019  Diagnosis: Left hand weakness [R29.898]  Carotid artery embolus, right [I65.21] Carotid artery thrombosis, right       Precautions: Fall  Chart, occupational therapy assessment, plan of care, and goals were reviewed. ASSESSMENT  Patient continues with skilled OT services and has progressed towards goals, all met. He demonstrates improved LUE strength & coordination, able to complete all functional reaching tasks and grooming tool management using LUE with independence. All ROM, strength, and coordination in L hand has resolved, patient diligent with utilizing green foam block, further exercises provided. Simulated a tub transfer with good safety awareness & balance, able to reach at varying heights to obtain ADL items with L hand with no difficulty. He has been up ad sahara, ambulating in hallways and completing standing BLE exercises, completing all ADLs IND at this time. No further acute OT needs, educated on energy conservation & home safety.  Safe to d/c once medically cleared. Current Level of Function (ADLs/self-care): IND with ADLs and mobility    Other factors to consider for discharge: lives alone, working full time         PLAN :  Rationale for discharge: Goals achieved  Recommend with staff: up ad sahara  Recommendation for discharge: (in order for the patient to meet his/her long term goals)  No skilled occupational therapy/ follow up rehabilitation needs identified at this time. This discharge recommendation:  Has been made in collaboration with the attending provider and/or case management    Equipment recommendations for successful discharge: none       SUBJECTIVE:   Patient stated I feel great. I just hope these hands are strong enough to squeeze a woman's bottom.     OBJECTIVE DATA SUMMARY:   Cognitive/Behavioral Status:  Neurologic State: Alert  Orientation Level: Oriented X4  Cognition: Appropriate for age attention/concentration; Appropriate decision making; Appropriate safety awareness; Follows commands  Perception: Appears intact  Perseveration: No perseveration noted  Safety/Judgement: Awareness of environment; Fall prevention;Good awareness of safety precautions; Home safety; Insight into deficits    Functional Mobility and Transfers for ADLs:  Bed Mobility:  Supine to Sit: Independent  Sit to Supine: Independent  Scooting: Independent    Transfers:  Sit to Stand: Independent     Bed to Chair: Independent    Balance:  Sitting: Intact  Standing: Intact  Standing - Static: Good  Standing - Dynamic : Good    ADL Intervention:       Grooming  Brushing Teeth: Independent(improved LUE strength for tool management)      Lower Body Dressing Assistance  Dressing Assistance: Independent  Socks: Independent     Educated patient on energy conservation techniques, strategies to maximize quality of life and decrease stress/anxiety. 1. Deep breathing  2.  Educated on pacing and making sure he/she takes short frequent breaks (e.g. In the shower wash the upper body, rest for 1 minute, then wash the lower body, etc)  3. Educated on using cooler water in the shower so as to not get fatigued from the heat  4. Educated on drying off by using a mallory cloth robe  5. Educated on re-arranging his/her routine to allow for rest breaks in the morning routine  6. Educated on using a mantra and medication to decrease feelings of anxiety, especially when short of breath  7. Educated on looking at the consequences of his/her actions before deciding he/she needs to take on a task (e.g not getting down on one's hands and knees to wash floors because it will take all of one's energy for the day and result in exhaustion). 8. Educated on DME used to help conserve energy, such as a shower seat, a stool or chair in the kitchen, and pushing or pulling items instead of carrying them. 9. Educated on fall alert necklaces/bracelets to increase safety      Cognitive Retraining  Safety/Judgement: Awareness of environment; Fall prevention;Good awareness of safety precautions; Home safety; Insight into deficits    Therapeutic Exercises:   Simulated tub transfer with IND, good safety awareness & balance  Encouraged tip to tip grasp/pinch to increase FM strength    Pain:  None reported    Activity Tolerance:   Good  Please refer to the flowsheet for vital signs taken during this treatment.     After treatment patient left in no apparent distress:   Supine in bed, Call bell within reach and Side rails x 3    COMMUNICATION/COLLABORATION:   The patients plan of care was discussed with: Physical Therapist and Registered Nurse    Mike Doherty, EVANS, OTR/L  Time Calculation: 23 mins

## 2019-10-07 NOTE — PROGRESS NOTES
Pharmacist Note - Warfarin Dosing  Consult provided for this 60 y.o.male to manage warfarin for right carotid artery thrombus  -recent history bilateral PE and LE DVT. INR Goal: 2 - 3    Home regimen/ tablet size: 5 mg every day (patient unsure/not compliant). 9/10/19 Eliquis changed back to warfarin 2/2 lack of insurance    Drugs that may increase INR: None  Drugs that may decrease INR: None  Other current anticoagulants/ drugs that may increase bleeding risk: Aspirin and Heparin  Risk factors: None  Daily INR ordered: YES    Recent Labs     10/07/19  0100 10/06/19  0130 10/05/19  0127   HGB  --  8.1* 7.8*   INR 1.3* 1.2* 1.2*     Date               INR                  Dose  10/3  1.2  5 mg   10/4  1.2  5 mg  10/5  1.2  7.5 mg  10/6  1.2  7.5 mg     10/7  1.3  9 mg                                                                                Assessment/ Plan: Will order warfarin 9 mg PO x 1 dose. Pharmacy will continue to monitor daily and adjust therapy as indicated. Please contact the pharmacist at x [phone] or  for outpatient recommendations if needed.

## 2019-10-07 NOTE — PROGRESS NOTES
RAFFI     1. Patient is currently on Heparin drip. Will continue to follow recommendations. 2. Spoke with [employer] Rep to inquire about re-assessing for Medicaid benefits. It was noted that patient isnt a [place] [address] and doesn't have life-threatening Dx therefore, he doesnt qualify for medicaid benefits regardless of his income at this time. [address] [place] [address] appointment scheduled for Oct 10. Will re-schedule PCP appointment closer to discharge date. CM to follow.  CAROL Mcgowan,CRM

## 2019-10-07 NOTE — PROGRESS NOTES
PHYSICAL THERAPY TREATMENT/DISCHARGE  Patient: Francesca Sheffield (8 y.o. male)  Date: 10/7/2019  Diagnosis: Left hand weakness [R29.898]  Carotid artery embolus, right [I65.21] Carotid artery thrombosis, right      Precautions: Fall  Chart, physical therapy assessment, plan of care and goals were reviewed. ASSESSMENT  Pt seen this afternoon following RN clearance. Pt observed by PT staff earlier this date ambulating around unit ad sahara and stretching is L calf on hand rail with RLE SLS, no overt LOB noted. Pt states \"I stretch this [LLE] religiously after I tore it years ago. Luciano Toledo \"  Pt agreeable to transfers, gait and stair clearance for home needs. Pt completed steps x2 trials (limited by IV pole-pt on Heparin drip) with and without rails per home needs. Pt with no balance deficits this session and no external support for functional mobility. Pt has been up ad sahara and mobilizes with pushing his own IV pole. Will discharge acute PT services, but please re-consult with any changes in status, further questions or concerns. Thank you. Other factors to consider for discharge: pt recently left by his wife and likely to be suffering emotionally amongst the medical and economical stressors          PLAN :  Patient will be discharged from acute skilled physical therapy at this time. Rationale for discharge:  Goals achieved    Recommendation for discharge: (in order for the patient to meet his/her long term goals)  No skilled physical therapy/ follow up rehabilitation needs identified at this time. This discharge recommendation:  Has not yet been discussed the attending provider and/or case management    Equipment recommendations for successful discharge: none       SUBJECTIVE:   Patient stated Ah, don't feel sorry for me, I'll keep busy. .. I have to put a new toilet in when I get home. ..  Pt reports working as a  for [employer].       OBJECTIVE DATA SUMMARY:   Critical Behavior:  Neurologic State: Alert  Orientation Level: Oriented X4  Cognition: Follows commands  Safety/Judgement: Awareness of environment, Home safety, Insight into deficits  Functional Mobility Training:  Transfers:  Sit to Stand: Independent  Stand to Sit: Independent  Bed to Chair: Independent  Balance:  Sitting: Intact  Standing: Intact  Standing - Static: Good  Standing - Dynamic : Good  Ambulation/Gait Training:  Distance (ft): 300 Feet (ft)  Assistive Device: Gait belt(no UE support)  Ambulation - Level of Assistance: Supervision; Independent  Gait Abnormalities: (appears normalized; altered by IV pole, if anything)  Speed/Nataliia: Slow  Step Length: Left shortened;Right shortened  Stairs:  Number of Stairs Trained: 4(x2 trials)  Stairs - Level of Assistance: Supervision   Rail Use: (unilateral rail vs none (1st/2nd trials))    Activity Tolerance:   Good  Please refer to the flowsheet for vital signs taken during this treatment.     After treatment patient left in no apparent distress:   Sitting in chair and Call bell within reach    COMMUNICATION/COLLABORATION:   The patients plan of care was discussed with: Registered Nurse    Kari Rees   Time Calculation: 9 mins

## 2019-10-07 NOTE — ROUTINE PROCESS
Bedside shift change report given to [address] [place] [address] (oncoming nurse) by Jeffry Hall (offgoing nurse). Report included the following information SBAR, Kardex, ED Summary, Intake/Output, MAR, Accordion, Recent Results and Cardiac Rhythm NSR/sinus delonte.

## 2019-10-07 NOTE — PROGRESS NOTES
NUTRITION EDUCATION       Nutrition Assessment:   RD consulted to complete diet related nutrition education. Pt and MD requested coumadin education. Visited with patient and provided booklet containing education & had discussion with patient. Nutrition Diagnosis:   1. Nutrition related knowledge deficit related to Vit K rich foods   as evidenced by   pt reports no previous education provided. Intervention:   1. Brief Nutrition education (E - 1.2) including identification of high Vit K foods & recommended dietary pattern while taking coumadin, consistent Vit K content of diet recommended. 2. Nutrition Counseling (C-2) including strategies for behavior modification, goal setting and planning    Monitoring/Evaluation:   Pt expressed understanding of education topics and acknowledged ability in applying diet goals. Pt correctly answered questions posed by RD to demonstrate learning.      Rosibel Portillo, MINH, MS, CDE

## 2019-10-07 NOTE — PROGRESS NOTES
Problem: Pain  Goal: *Control of Pain  Outcome: Progressing Towards Goal     Problem: Falls - Risk of  Goal: *Absence of Falls  Description  Document Shanelle Thomas Fall Risk and appropriate interventions in the flowsheet. Outcome: Progressing Towards Goal  Note:   Fall Risk Interventions:  Mobility Interventions: Communicate number of staff needed for ambulation/transfer, OT consult for ADLs, Patient to call before getting OOB, PT Consult for mobility concerns, PT Consult for assist device competence         Medication Interventions: Evaluate medications/consider consulting pharmacy, Patient to call before getting OOB, Teach patient to arise slowly    Elimination Interventions: Call light in reach, Patient to call for help with toileting needs, Stay With Me (per policy), Toilet paper/wipes in reach, Toileting schedule/hourly rounds              Problem: Pressure Injury - Risk of  Goal: *Prevention of pressure injury  Description  Document Capo Scale and appropriate interventions in the flowsheet. Outcome: Progressing Towards Goal  Note:   Pressure Injury Interventions:  Sensory Interventions: Assess changes in LOC, Avoid rigorous massage over bony prominences, Assess need for specialty bed         Activity Interventions: Increase time out of bed, Pressure redistribution bed/mattress(bed type), PT/OT evaluation    Mobility Interventions: HOB 30 degrees or less, Pressure redistribution bed/mattress (bed type), PT/OT evaluation, Turn and reposition approx.  every two hours(pillow and wedges)    Nutrition Interventions: Offer support with meals,snacks and hydration                     Problem: TIA/CVA Stroke: Day 2 Until Discharge  Goal: Activity/Safety  Outcome: Progressing Towards Goal  Goal: Diagnostic Test/Procedures  Outcome: Progressing Towards Goal  Goal: Nutrition/Diet  Outcome: Progressing Towards Goal  Goal: Discharge Planning  Outcome: Progressing Towards Goal  Goal: Medications  Outcome: Progressing Towards Goal  Goal: Respiratory  Outcome: Progressing Towards Goal  Goal: Treatments/Interventions/Procedures  Outcome: Progressing Towards Goal  Goal: Psychosocial  Outcome: Progressing Towards Goal  Goal: *Verbalizes anxiety and depression are reduced or absent  Outcome: Progressing Towards Goal  Goal: *Absence of aspiration  Outcome: Progressing Towards Goal  Goal: *Absence of deep venous thrombosis signs and symptoms(Stroke Metric)  Outcome: Progressing Towards Goal  Goal: *Optimal pain control at patient's stated goal  Outcome: Progressing Towards Goal  Goal: *Tolerating diet  Outcome: Progressing Towards Goal  Goal: *Ability to perform ADLs and demonstrates progressive mobility and function  Outcome: Progressing Towards Goal  Goal: *Stroke education continued(Stroke Metric)  Outcome: Progressing Towards Goal

## 2019-10-08 LAB
ALBUMIN SERPL-MCNC: 3.3 G/DL (ref 3.5–5)
ALBUMIN/GLOB SERPL: 0.8 {RATIO} (ref 1.1–2.2)
ALP SERPL-CCNC: 100 U/L (ref 45–117)
ALT SERPL-CCNC: 22 U/L (ref 12–78)
ANION GAP SERPL CALC-SCNC: 8 MMOL/L (ref 5–15)
APTT PPP: 115.3 SEC (ref 22.1–32)
APTT PPP: 32.2 SEC (ref 22.1–32)
APTT PPP: 39.7 SEC (ref 22.1–32)
APTT PPP: 84.8 SEC (ref 22.1–32)
AST SERPL-CCNC: 55 U/L (ref 15–37)
BASOPHILS # BLD: 0 K/UL (ref 0–0.1)
BASOPHILS NFR BLD: 0 % (ref 0–1)
BILIRUB SERPL-MCNC: 0.4 MG/DL (ref 0.2–1)
BUN SERPL-MCNC: 6 MG/DL (ref 6–20)
BUN/CREAT SERPL: 6 (ref 12–20)
CALCIUM SERPL-MCNC: 9.4 MG/DL (ref 8.5–10.1)
CHLORIDE SERPL-SCNC: 106 MMOL/L (ref 97–108)
CO2 SERPL-SCNC: 24 MMOL/L (ref 21–32)
CREAT SERPL-MCNC: 0.97 MG/DL (ref 0.7–1.3)
DIFFERENTIAL METHOD BLD: ABNORMAL
EOSINOPHIL # BLD: 0.1 K/UL (ref 0–0.4)
EOSINOPHIL NFR BLD: 1 % (ref 0–7)
ERYTHROCYTE [DISTWIDTH] IN BLOOD BY AUTOMATED COUNT: 19.8 % (ref 11.5–14.5)
GLOBULIN SER CALC-MCNC: 4 G/DL (ref 2–4)
GLUCOSE SERPL-MCNC: 86 MG/DL (ref 65–100)
HCT VFR BLD AUTO: 27.3 % (ref 36.6–50.3)
HGB BLD-MCNC: 8.4 G/DL (ref 12.1–17)
IMM GRANULOCYTES # BLD AUTO: 0.1 K/UL (ref 0–0.04)
IMM GRANULOCYTES NFR BLD AUTO: 1 % (ref 0–0.5)
INR PPP: 1.3 (ref 0.9–1.1)
LYMPHOCYTES # BLD: 2.2 K/UL (ref 0.8–3.5)
LYMPHOCYTES NFR BLD: 26 % (ref 12–49)
MAGNESIUM SERPL-MCNC: 2 MG/DL (ref 1.6–2.4)
MCH RBC QN AUTO: 33.6 PG (ref 26–34)
MCHC RBC AUTO-ENTMCNC: 30.8 G/DL (ref 30–36.5)
MCV RBC AUTO: 109.2 FL (ref 80–99)
MONOCYTES # BLD: 0.5 K/UL (ref 0–1)
MONOCYTES NFR BLD: 6 % (ref 5–13)
NEUTS SEG # BLD: 5.4 K/UL (ref 1.8–8)
NEUTS SEG NFR BLD: 66 % (ref 32–75)
NRBC # BLD: 0 K/UL (ref 0–0.01)
NRBC BLD-RTO: 0 PER 100 WBC
PHOSPHATE SERPL-MCNC: 3.8 MG/DL (ref 2.6–4.7)
PLATELET # BLD AUTO: 345 K/UL (ref 150–400)
PMV BLD AUTO: 9.4 FL (ref 8.9–12.9)
POTASSIUM SERPL-SCNC: 4.3 MMOL/L (ref 3.5–5.1)
PROT SERPL-MCNC: 7.3 G/DL (ref 6.4–8.2)
PROTHROMBIN TIME: 12.9 SEC (ref 9–11.1)
RBC # BLD AUTO: 2.5 M/UL (ref 4.1–5.7)
RBC MORPH BLD: ABNORMAL
SODIUM SERPL-SCNC: 138 MMOL/L (ref 136–145)
THERAPEUTIC RANGE,PTTT: ABNORMAL SECS (ref 58–77)
WBC # BLD AUTO: 8.3 K/UL (ref 4.1–11.1)

## 2019-10-08 PROCEDURE — 80053 COMPREHEN METABOLIC PANEL: CPT

## 2019-10-08 PROCEDURE — 85730 THROMBOPLASTIN TIME PARTIAL: CPT

## 2019-10-08 PROCEDURE — 74011250636 HC RX REV CODE- 250/636: Performed by: NURSE PRACTITIONER

## 2019-10-08 PROCEDURE — 85025 COMPLETE CBC W/AUTO DIFF WBC: CPT

## 2019-10-08 PROCEDURE — 74011250637 HC RX REV CODE- 250/637: Performed by: NURSE PRACTITIONER

## 2019-10-08 PROCEDURE — 74011250637 HC RX REV CODE- 250/637: Performed by: INTERNAL MEDICINE

## 2019-10-08 PROCEDURE — 74011250637 HC RX REV CODE- 250/637: Performed by: PSYCHIATRY & NEUROLOGY

## 2019-10-08 PROCEDURE — 84100 ASSAY OF PHOSPHORUS: CPT

## 2019-10-08 PROCEDURE — 83735 ASSAY OF MAGNESIUM: CPT

## 2019-10-08 PROCEDURE — 65660000000 HC RM CCU STEPDOWN

## 2019-10-08 PROCEDURE — 85610 PROTHROMBIN TIME: CPT

## 2019-10-08 PROCEDURE — 74011250637 HC RX REV CODE- 250/637: Performed by: HOSPITALIST

## 2019-10-08 PROCEDURE — 74011250636 HC RX REV CODE- 250/636: Performed by: HOSPITALIST

## 2019-10-08 PROCEDURE — 36415 COLL VENOUS BLD VENIPUNCTURE: CPT

## 2019-10-08 RX ORDER — WARFARIN SODIUM 5 MG/1
10 TABLET ORAL ONCE
Status: COMPLETED | OUTPATIENT
Start: 2019-10-08 | End: 2019-10-08

## 2019-10-08 RX ORDER — HEPARIN SODIUM 5000 [USP'U]/ML
4000 INJECTION, SOLUTION INTRAVENOUS; SUBCUTANEOUS ONCE
Status: COMPLETED | OUTPATIENT
Start: 2019-10-08 | End: 2019-10-08

## 2019-10-08 RX ADMIN — ACETAMINOPHEN 650 MG: 325 TABLET, FILM COATED ORAL at 21:53

## 2019-10-08 RX ADMIN — ATORVASTATIN CALCIUM 80 MG: 40 TABLET, FILM COATED ORAL at 08:54

## 2019-10-08 RX ADMIN — ASPIRIN 81 MG CHEWABLE TABLET 81 MG: 81 TABLET CHEWABLE at 08:54

## 2019-10-08 RX ADMIN — HEPARIN SODIUM 4000 UNITS: 5000 INJECTION INTRAVENOUS; SUBCUTANEOUS at 03:52

## 2019-10-08 RX ADMIN — HEPARIN SODIUM AND DEXTROSE 24 UNITS/KG/HR: 10000; 5 INJECTION INTRAVENOUS at 21:02

## 2019-10-08 RX ADMIN — HEPARIN SODIUM AND DEXTROSE 27 UNITS/KG/HR: 10000; 5 INJECTION INTRAVENOUS at 03:52

## 2019-10-08 RX ADMIN — PANTOPRAZOLE SODIUM 40 MG: 40 TABLET, DELAYED RELEASE ORAL at 08:54

## 2019-10-08 RX ADMIN — WARFARIN SODIUM 10 MG: 5 TABLET ORAL at 18:12

## 2019-10-08 NOTE — PROGRESS NOTES
Bedside and Verbal shift change report given to Hampton Osler, RN (oncoming nurse) by Mirta Pedraza RN (offgoing nurse). Report included the following information SBAR, Kardex, Procedure Summary, Intake/Output, MAR, Recent Results and Cardiac Rhythm NSR.

## 2019-10-08 NOTE — PROGRESS NOTES
Problem: Pain  Goal: *Control of Pain  Outcome: Progressing Towards Goal     Problem: Falls - Risk of  Goal: *Absence of Falls  Description  Document Michael Host Fall Risk and appropriate interventions in the flowsheet. Outcome: Progressing Towards Goal  Note:   Fall Risk Interventions:  Mobility Interventions: Communicate number of staff needed for ambulation/transfer, OT consult for ADLs, Patient to call before getting OOB, PT Consult for mobility concerns, PT Consult for assist device competence         Medication Interventions: Evaluate medications/consider consulting pharmacy, Patient to call before getting OOB, Teach patient to arise slowly    Elimination Interventions: Call light in reach, Elevated toilet seat, Patient to call for help with toileting needs, Stay With Me (per policy), Toilet paper/wipes in reach, Toileting schedule/hourly rounds              Problem: Pressure Injury - Risk of  Goal: *Prevention of pressure injury  Description  Document Capo Scale and appropriate interventions in the flowsheet. Outcome: Progressing Towards Goal  Note:   Pressure Injury Interventions:  Sensory Interventions: Float heels, Discuss PT/OT consult with provider, Keep linens dry and wrinkle-free, Maintain/enhance activity level, Minimize linen layers, Turn and reposition approx. every two hours (pillows and wedges if needed)         Activity Interventions: Increase time out of bed, Pressure redistribution bed/mattress(bed type), PT/OT evaluation    Mobility Interventions: Float heels, HOB 30 degrees or less, Pressure redistribution bed/mattress (bed type), PT/OT evaluation, Turn and reposition approx.  every two hours(pillow and wedges)    Nutrition Interventions: Document food/fluid/supplement intake, Discuss nutritional consult with provider                     Problem: TIA/CVA Stroke: Day 2 Until Discharge  Goal: Activity/Safety  Outcome: Progressing Towards Goal  Goal: Diagnostic Test/Procedures  Outcome: Progressing Towards Goal  Goal: Nutrition/Diet  Outcome: Progressing Towards Goal  Goal: Discharge Planning  Outcome: Progressing Towards Goal  Goal: Medications  Outcome: Progressing Towards Goal  Goal: Respiratory  Outcome: Progressing Towards Goal  Goal: Treatments/Interventions/Procedures  Outcome: Progressing Towards Goal  Goal: Psychosocial  Outcome: Progressing Towards Goal  Goal: *Verbalizes anxiety and depression are reduced or absent  Outcome: Progressing Towards Goal  Goal: *Absence of aspiration  Outcome: Progressing Towards Goal  Goal: *Absence of deep venous thrombosis signs and symptoms(Stroke Metric)  Outcome: Progressing Towards Goal  Goal: *Optimal pain control at patient's stated goal  Outcome: Progressing Towards Goal  Goal: *Tolerating diet  Outcome: Progressing Towards Goal  Goal: *Ability to perform ADLs and demonstrates progressive mobility and function  Outcome: Progressing Towards Goal  Goal: *Stroke education continued(Stroke Metric)  Outcome: Progressing Towards Goal

## 2019-10-08 NOTE — PROGRESS NOTES
Problem: Falls - Risk of  Goal: *Absence of Falls  Description  Document [facility] Fall Risk and appropriate interventions in the flowsheet.   Outcome: Progressing Towards Goal  Note:   Fall Risk Interventions:  Mobility Interventions: Bed/chair exit alarm         Medication Interventions: Bed/chair exit alarm, Teach patient to arise slowly    Elimination Interventions: Call light in reach, Urinal in reach              Problem: TIA/CVA Stroke: Day 2 Until Discharge  Goal: Activity/Safety  Outcome: Progressing Towards Goal  Goal: Diagnostic Test/Procedures  Outcome: Progressing Towards Goal  Goal: Nutrition/Diet  Outcome: Progressing Towards Goal  Goal: Discharge Planning  Outcome: Progressing Towards Goal  Goal: Medications  Outcome: Progressing Towards Goal  Goal: Respiratory  Outcome: Progressing Towards Goal  Goal: Treatments/Interventions/Procedures  Outcome: Progressing Towards Goal  Goal: *Verbalizes anxiety and depression are reduced or absent  Outcome: Progressing Towards Goal  Goal: *Absence of aspiration  Outcome: Progressing Towards Goal  Goal: *Absence of deep venous thrombosis signs and symptoms(Stroke Metric)  Outcome: Progressing Towards Goal  Goal: *Optimal pain control at patient's stated goal  Outcome: Progressing Towards Goal  Goal: *Tolerating diet  Outcome: Progressing Towards Goal  Goal: *Ability to perform ADLs and demonstrates progressive mobility and function  Outcome: Progressing Towards Goal  Goal: *Stroke education continued(Stroke Metric)  Outcome: Progressing Towards Goal     Problem: Ischemic Stroke: Discharge Outcomes  Goal: *Verbalizes anxiety and depression are reduced or absent  Outcome: Progressing Towards Goal  Goal: *Verbalize understanding of risk factor modification(Stroke Metric)  Outcome: Progressing Towards Goal  Goal: *Hemodynamically stable  Outcome: Progressing Towards Goal  Goal: *Absence of aspiration pneumonia  Outcome: Progressing Towards Goal  Goal: *Aware of needed dietary changes  Outcome: Progressing Towards Goal  Goal: *Verbalize understanding of prescribed medications including anti-coagulants, anti-lipid, and/or anti-platelets(Stroke Metric)  Outcome: Progressing Towards Goal  Goal: *Tolerating diet  Outcome: Progressing Towards Goal  Goal: *Aware of follow-up diagnostics related to anticoagulants  Outcome: Progressing Towards Goal  Goal: *Ability to perform ADLs and demonstrates progressive mobility and function  Outcome: Progressing Towards Goal  Goal: *Absence of DVT(Stroke Metric)  Outcome: Progressing Towards Goal  Goal: *Absence of aspiration  Outcome: Progressing Towards Goal  Goal: *Optimal pain control at patient's stated goal  Outcome: Progressing Towards Goal  Goal: *Home safety concerns addressed  Outcome: Progressing Towards Goal  Goal: *Describes available resources and support systems  Outcome: Progressing Towards Goal  Goal: *Verbalizes understanding of activation of EMS(911) for stroke symptoms(Stroke Metric)  Outcome: Progressing Towards Goal  Goal: *Understands and describes signs and symptoms to report to providers(Stroke Metric)  Outcome: Progressing Towards Goal  Goal: *Neurolgocially stable (absence of additional neurological deficits)  Outcome: Progressing Towards Goal  Goal: *Verbalizes importance of follow-up with primary care physician(Stroke Metric)  Outcome: Progressing Towards Goal  Goal: *Smoking cessation discussed,if applicable(Stroke Metric)  Outcome: Progressing Towards Goal  Goal: *Depression screening completed(Stroke Metric)  Outcome: Progressing Towards Goal

## 2019-10-08 NOTE — PROGRESS NOTES
Problem: Pain  Goal: *Control of Pain  Outcome: Progressing Towards Goal  Goal: *PALLIATIVE CARE:  Alleviation of Pain  Outcome: Progressing Towards Goal     Problem: Falls - Risk of  Goal: *Absence of Falls  Description  Document Francesco Luong Fall Risk and appropriate interventions in the flowsheet.   Outcome: Progressing Towards Goal  Note:   Fall Risk Interventions:  Mobility Interventions: Patient to call before getting OOB, PT Consult for mobility concerns         Medication Interventions: Patient to call before getting OOB, Teach patient to arise slowly    Elimination Interventions: Call light in reach, Toileting schedule/hourly rounds              Problem: TIA/CVA Stroke: 0-24 hours  Goal: Off Pathway (Use only if patient is Off Pathway)  Outcome: Progressing Towards Goal  Goal: Activity/Safety  Outcome: Progressing Towards Goal  Goal: Consults, if ordered  Outcome: Progressing Towards Goal  Goal: Diagnostic Test/Procedures  Outcome: Progressing Towards Goal  Goal: Nutrition/Diet  Outcome: Progressing Towards Goal  Goal: Discharge Planning  Outcome: Progressing Towards Goal  Goal: Medications  Outcome: Progressing Towards Goal  Goal: Respiratory  Outcome: Progressing Towards Goal  Goal: Treatments/Interventions/Procedures  Outcome: Progressing Towards Goal  Goal: Minimize risk of bleeding post-thrombolytic infusion  Outcome: Progressing Towards Goal  Goal: Monitor for complications post-thrombolytic infusion  Outcome: Progressing Towards Goal  Goal: Psychosocial  Outcome: Progressing Towards Goal  Goal: *Hemodynamically stable  Outcome: Progressing Towards Goal  Goal: *Neurologically stable  Description  Absence of additional neurological deficits    Outcome: Progressing Towards Goal  Goal: *Verbalizes anxiety and depression are reduced or absent  Outcome: Progressing Towards Goal  Goal: *Absence of Signs of Aspiration on Current Diet  Outcome: Progressing Towards Goal  Goal: *Absence of deep venous thrombosis signs and symptoms(Stroke Metric)  Outcome: Progressing Towards Goal  Goal: *Ability to perform ADLs and demonstrates progressive mobility and function  Outcome: Progressing Towards Goal  Goal: *Stroke education started(Stroke Metric)  Outcome: Progressing Towards Goal  Goal: *Dysphagia screen performed(Stroke Metric)  Outcome: Progressing Towards Goal  Goal: *Rehab consulted(Stroke Metric)  Outcome: Progressing Towards Goal     Problem: Patient Education: Go to Patient Education Activity  Goal: Patient/Family Education  Outcome: Progressing Towards Goal     Problem: TIA/CVA Stroke: 0-24 hours  Goal: Off Pathway (Use only if patient is Off Pathway)  Outcome: Progressing Towards Goal  Goal: Activity/Safety  Outcome: Progressing Towards Goal  Goal: Consults, if ordered  Outcome: Progressing Towards Goal  Goal: Diagnostic Test/Procedures  Outcome: Progressing Towards Goal  Goal: Nutrition/Diet  Outcome: Progressing Towards Goal  Goal: Discharge Planning  Outcome: Progressing Towards Goal  Goal: Medications  Outcome: Progressing Towards Goal  Goal: Respiratory  Outcome: Progressing Towards Goal  Goal: Treatments/Interventions/Procedures  Outcome: Progressing Towards Goal  Goal: Minimize risk of bleeding post-thrombolytic infusion  Outcome: Progressing Towards Goal  Goal: Monitor for complications post-thrombolytic infusion  Outcome: Progressing Towards Goal  Goal: Psychosocial  Outcome: Progressing Towards Goal  Goal: *Hemodynamically stable  Outcome: Progressing Towards Goal  Goal: *Neurologically stable  Description  Absence of additional neurological deficits    Outcome: Progressing Towards Goal  Goal: *Verbalizes anxiety and depression are reduced or absent  Outcome: Progressing Towards Goal  Goal: *Absence of Signs of Aspiration on Current Diet  Outcome: Progressing Towards Goal  Goal: *Absence of deep venous thrombosis signs and symptoms(Stroke Metric)  Outcome: Progressing Towards Goal  Goal: *Ability to perform ADLs and demonstrates progressive mobility and function  Outcome: Progressing Towards Goal  Goal: *Stroke education started(Stroke Metric)  Outcome: Progressing Towards Goal  Goal: *Dysphagia screen performed(Stroke Metric)  Outcome: Progressing Towards Goal  Goal: *Rehab consulted(Stroke Metric)  Outcome: Progressing Towards Goal     Problem: TIA/CVA Stroke: Day 2 Until Discharge  Goal: Off Pathway (Use only if patient is Off Pathway)  Outcome: Progressing Towards Goal  Goal: Activity/Safety  Outcome: Progressing Towards Goal  Goal: Diagnostic Test/Procedures  Outcome: Progressing Towards Goal  Goal: Nutrition/Diet  Outcome: Progressing Towards Goal  Goal: Discharge Planning  Outcome: Progressing Towards Goal  Goal: Medications  Outcome: Progressing Towards Goal  Goal: Respiratory  Outcome: Progressing Towards Goal  Goal: Treatments/Interventions/Procedures  Outcome: Progressing Towards Goal  Goal: Psychosocial  Outcome: Progressing Towards Goal  Goal: *Verbalizes anxiety and depression are reduced or absent  Outcome: Progressing Towards Goal  Goal: *Absence of aspiration  Outcome: Progressing Towards Goal  Goal: *Absence of deep venous thrombosis signs and symptoms(Stroke Metric)  Outcome: Progressing Towards Goal  Goal: *Optimal pain control at patient's stated goal  Outcome: Progressing Towards Goal  Goal: *Tolerating diet  Outcome: Progressing Towards Goal  Goal: *Ability to perform ADLs and demonstrates progressive mobility and function  Outcome: Progressing Towards Goal  Goal: *Stroke education continued(Stroke Metric)  Outcome: Progressing Towards Goal     Problem: Ischemic Stroke: Discharge Outcomes  Goal: *Verbalizes anxiety and depression are reduced or absent  Outcome: Progressing Towards Goal  Goal: *Verbalize understanding of risk factor modification(Stroke Metric)  Outcome: Progressing Towards Goal  Goal: *Hemodynamically stable  Outcome: Progressing Towards Goal  Goal: *Absence of aspiration pneumonia  Outcome: Progressing Towards Goal  Goal: *Aware of needed dietary changes  Outcome: Progressing Towards Goal  Goal: *Verbalize understanding of prescribed medications including anti-coagulants, anti-lipid, and/or anti-platelets(Stroke Metric)  Outcome: Progressing Towards Goal  Goal: *Tolerating diet  Outcome: Progressing Towards Goal  Goal: *Aware of follow-up diagnostics related to anticoagulants  Outcome: Progressing Towards Goal  Goal: *Ability to perform ADLs and demonstrates progressive mobility and function  Outcome: Progressing Towards Goal  Goal: *Absence of DVT(Stroke Metric)  Outcome: Progressing Towards Goal  Goal: *Absence of aspiration  Outcome: Progressing Towards Goal  Goal: *Optimal pain control at patient's stated goal  Outcome: Progressing Towards Goal  Goal: *Home safety concerns addressed  Outcome: Progressing Towards Goal  Goal: *Describes available resources and support systems  Outcome: Progressing Towards Goal  Goal: *Verbalizes understanding of activation of EMS(911) for stroke symptoms(Stroke Metric)  Outcome: Progressing Towards Goal  Goal: *Understands and describes signs and symptoms to report to providers(Stroke Metric)  Outcome: Progressing Towards Goal  Goal: *Neurolgocially stable (absence of additional neurological deficits)  Outcome: Progressing Towards Goal  Goal: *Verbalizes importance of follow-up with primary care physician(Stroke Metric)  Outcome: Progressing Towards Goal  Goal: *Smoking cessation discussed,if applicable(Stroke Metric)  Outcome: Progressing Towards Goal  Goal: *Depression screening completed(Stroke Metric)  Outcome: Progressing Towards Goal

## 2019-10-08 NOTE — PROGRESS NOTES
Pharmacist Note - Warfarin Dosing  Consult provided for this 60 y.o.male to manage warfarin for right carotid artery thrombus  - Recent history bilateral PE and LE DVT. INR Goal: 2 - 3    Home regimen/ tablet size: 5 mg every day (patient unsure/not compliant). 9/10/19 Eliquis changed back to warfarin due to lack of insurance    Drugs that may increase INR: None  Drugs that may decrease INR: None  Other current anticoagulants/ drugs that may increase bleeding risk: Aspirin and Heparin  Risk factors: None  Daily INR ordered: YES    Recent Labs     10/08/19  0200 10/07/19  0100 10/06/19  0130   HGB 8.4*  --  8.1*   INR 1.3* 1.3* 1.2*     Date               INR                  Dose  10/3  1.2  5 mg   10/4  1.2  5 mg  10/5  1.2  7.5 mg  10/6  1.2  7.5 mg     10/7  1.3  9 mg   10/8  1.3  10 mg                                                                                 Assessment/ Plan: Will order warfarin 10 mg PO x 1 dose. Pharmacy will continue to monitor daily and adjust therapy as indicated. Please contact the pharmacist at x [phone] or  for outpatient recommendations if needed.

## 2019-10-08 NOTE — PROGRESS NOTES
Problem: Pain  Goal: *Control of Pain  Outcome: Progressing Towards Goal  Goal: *PALLIATIVE CARE:  Alleviation of Pain  Outcome: Progressing Towards Goal     Problem: Falls - Risk of  Goal: *Absence of Falls  Description  Document Amirahaye Luong Fall Risk and appropriate interventions in the flowsheet. Outcome: Progressing Towards Goal  Note:   Fall Risk Interventions:  Mobility Interventions: Patient to call before getting OOB         Medication Interventions: Patient to call before getting OOB, Teach patient to arise slowly    Elimination Interventions: Call light in reach, Toileting schedule/hourly rounds              Problem: Pressure Injury - Risk of  Goal: *Prevention of pressure injury  Description  Document Capo Scale and appropriate interventions in the flowsheet.   Outcome: Progressing Towards Goal  Note:   Pressure Injury Interventions:  Sensory Interventions: Assess changes in LOC, Float heels, Keep linens dry and wrinkle-free, Maintain/enhance activity level, Minimize linen layers, Pressure redistribution bed/mattress (bed type)         Activity Interventions: Increase time out of bed, Pressure redistribution bed/mattress(bed type)    Mobility Interventions: Pressure redistribution bed/mattress (bed type)    Nutrition Interventions: Document food/fluid/supplement intake                     Problem: Patient Education: Go to Patient Education Activity  Goal: Patient/Family Education  Outcome: Progressing Towards Goal     Problem: TIA/CVA Stroke: Day 2 Until Discharge  Goal: Activity/Safety  Outcome: Progressing Towards Goal  Goal: Diagnostic Test/Procedures  Outcome: Progressing Towards Goal  Goal: Nutrition/Diet  Outcome: Progressing Towards Goal  Goal: Discharge Planning  Outcome: Progressing Towards Goal  Goal: Medications  Outcome: Progressing Towards Goal  Goal: Respiratory  Outcome: Progressing Towards Goal  Goal: Treatments/Interventions/Procedures  Outcome: Progressing Towards Goal  Goal: Psychosocial  Outcome: Progressing Towards Goal  Goal: *Verbalizes anxiety and depression are reduced or absent  Outcome: Progressing Towards Goal  Goal: *Absence of aspiration  Outcome: Progressing Towards Goal  Goal: *Absence of deep venous thrombosis signs and symptoms(Stroke Metric)  Outcome: Progressing Towards Goal  Goal: *Optimal pain control at patient's stated goal  Outcome: Progressing Towards Goal  Goal: *Tolerating diet  Outcome: Progressing Towards Goal  Goal: *Ability to perform ADLs and demonstrates progressive mobility and function  Outcome: Progressing Towards Goal  Goal: *Stroke education continued(Stroke Metric)  Outcome: Progressing Towards Goal

## 2019-10-08 NOTE — INTERDISCIPLINARY ROUNDS
Bedside and Verbal shift change report given to Thalia Chapman RN (oncoming nurse) by Jonathan Ortega RN (offgoing nurse). Report included the following information SBAR, Kardex, Intake/Output, MAR, Accordion, Recent Results, Med Rec Status and Cardiac Rhythm NSR.

## 2019-10-08 NOTE — PROGRESS NOTES
Called into room by PCT stating pt was shaking in bed. During episode pt is talking and oriented and states he is \"freezing cold\". Warm blanket given and room temperature adjusted. Pt temp 99.4 and other VSS. Bed alarm applied for safety and pt instructed to not get out of bed with out assistance. Pt then states \"could it be DT's from not drinking? My last drink was in August\". Jailene Cabrera NP notified, orders obtained for labs.

## 2019-10-08 NOTE — PROGRESS NOTES
Hospitalist Progress Note  iJm Paulson MD  Answering service: [phone] from in house phone        Date of Service:  10/8/2019  NAME:  Khoi Prescott  :  1955  MRN:  [id number]      Admission Summary: This is a 82-year-old  male with past medical history significant for bilateral lower extremity DVT and bilateral PE, on Coumadin, history of kidney stones, history of alcohol abuse and tobacco abuse, presented to [facility] Emergency Department with left hand weakness that started this morning quarter to 07:00 a.m. He stated that he had left hand weakness around 04:00 p.m. yesterday, and the weakness lasted for 3 hours and went away. This morning quarter to 07:00 a.m., he said he felt like someone put pressure on his left arm. He could not make a fist and decided to come to [facility] Emergency Department. No headache, fever, chills, sweating, left-sided chest pain, palpitation, shortness of breath, cough, abdominal pain, urinary complaint, or lower extremity weakness or swelling. No abnormal bowel movement. He took his Coumadin at 6 a.m. this morning. Interval history / Subjective:   Patient seen and evaluated. -complains of slight headache. No other complaints. He is aware his INR should be within 2-3 range before discharge.  -Left hand pain from infiltrated iv . Discussed  with nursing to remove the liner as he has other lines     Assessment & Plan:       Symptomatic right carotid thrombus with subsequent small infarcts    -Pt presented with left hand weakness and CTA showed large wall adherent fibrofatty plaque/thrombus in the distal right common carotid artery extending to the bifurcation.  -Neurointervention consulted. Pt is on heparin drip. Will continue to follow recommendations.  -On statin. -ECHO c/w EF 56 - 60%.  No regional wall motion abnormality noted.  -Educated about stroke  -As per neurology continue on heparin drip for now,then transitions to PO AC based on neurointervention recommendations. Started coumadin 10/3  consult pharmacy for dosing   physical therapy. Goal of INR 2-3, should have overlap 1-2 days of heparin drip when INR reach  Therapeutic.   -d/w Neurologist and NIS Dr. Joelene Cushing, per NIS, consider repeat CTA of head/neck in a few days ( Monday) when pt remain on therepeutic heparin drip   - Continue management as per neuro interventional surgery  -repeat CTA of neck and head today, Improvement in the caliber of thrombus in the right common carotid, internal  and external carotid arteries  -dietician consult for his coumadin   -INR 1. 3. Target 2-3    Anemia:   Hb 8.1  Monitor      Hypokalemia. -Resolved    Hypophosphatemia.   -On Neutra-Phos. am labs     Leukocytosis, 12.1 10/3 . Resolved  -The patient is afebrile, no signs or symptoms  of infection. H/o  bilateral large PE and bilateral lower extremity DVT, on 08/08/2018    -Pt took Coumadin on outpatient. Difficult to tell if he was compliant being an alcohol and not knowing when the last time he took it. On  Heparin, pharmacy consulted for coumadin dosing    6) Tobacco abuse.    -The patient is advised to stop smoking. 7) H/o alcohol abuse  -Thiamine,folic acid po     Code status:full code  DVT prophylaxis:on heparin drip   Care Plan discussed with: Patient/Family  Disposition: TBD, home when INR therapeutic.  Need a new PCP to follow up his INR      Hospital Problems  Date Reviewed: 8/8/2018          Codes Class Noted POA    Carotid artery embolus, right ICD-10-CM: I65.21  ICD-9-CM: 433.10  10/2/2019 Unknown        * (Principal) Carotid artery thrombosis, right ICD-10-CM: I65.21  ICD-9-CM: 433.10  10/1/2019 Unknown        Stroke due to embolism [facility]) ICD-10-CM: I63.9  ICD-9-CM: 434.11  10/1/2019 Unknown        Left hand weakness ICD-10-CM: R29.898  ICD-9-CM: 728.87  9/30/2019 Unknown                Review of Systems:   A comprehensive review of systems was negative except for that written in the HPI. Vital Signs:    Last 24hrs VS reviewed since prior progress note. Most recent are:  Visit Vitals  /67 (BP 1 Location: Right arm, BP Patient Position: At rest)   Pulse 100   Temp 99.3 °F (37.4 °C)   Resp 29   Ht 5' 10\" (1.778 m)   Wt 85.1 kg (187 lb 9.6 oz)   SpO2 96%   BMI 26.92 kg/m²         Intake/Output Summary (Last 24 hours) at 10/8/2019 0935  Last data filed at 10/8/2019 5862  Gross per 24 hour   Intake 57.62 ml   Output 400 ml   Net -342.38 ml        Physical Examination:         Constitutional:  No acute distress, cooperative, pleasant    ENT:  Oral mucous moist, oropharynx benign. Resp:  CTA bilaterally. No wheezing/rhonchi/rales. No accessory muscle use   CV:  Regular rhythm, normal rate, no murmurs, gallops, rubs    GI:  Soft, non distended, non tender. normoactive bowel sounds, no hepatosplenomegaly     Musculoskeletal:  No edema, warm, 2+ pulses throughout    Neurologic:  Moves all extremities. Good power both upper extremities. AAOx3. Psych:  Good insight, Not anxious nor agitated. Data Review:    Review and/or order of clinical lab test      Labs:     Recent Labs     10/08/19  0200 10/06/19  0130   WBC 8.3 10.6   HGB 8.4* 8.1*   HCT 27.3* 25.5*    298     Recent Labs     10/08/19  0608 10/06/19  0130    139   K 4.3 3.9    105   CO2 24 29   BUN 6 5*   CREA 0.97 0.76   GLU 86 108*   CA 9.4 8.7   MG 2.0  --    PHOS 3.8  --      Recent Labs     10/08/19  0608   SGOT 55*   ALT 22      TBILI 0.4   TP 7.3   ALB 3.3*   GLOB 4.0     Recent Labs     10/08/19  0200 10/07/19  0710 10/07/19  0100  10/06/19  0130   INR 1.3*  --  1.3*  --  1.2*   PTP 12.9*  --  12.8*  --  11.9*   APTT 32.2* 71.5* 77.2*   < > 96.8*    < > = values in this interval not displayed. No results for input(s): FE, TIBC, PSAT, FERR in the last 72 hours.    Lab Results   Component Value Date/Time    Folate 4.4 (L) 08/09/2018 03:24 AM      No results for input(s): PH, PCO2, PO2 in the last 72 hours. No results for input(s): CPK, CKNDX, TROIQ in the last 72 hours.     No lab exists for component: CPKMB  Lab Results   Component Value Date/Time    Cholesterol, total 94 10/01/2019 03:50 AM    HDL Cholesterol 37 10/01/2019 03:50 AM    LDL, calculated 44.6 10/01/2019 03:50 AM    Triglyceride 62 10/01/2019 03:50 AM    CHOL/HDL Ratio 2.5 10/01/2019 03:50 AM     Lab Results   Component Value Date/Time    Glucose (POC) 138 (H) 09/30/2019 09:55 AM     Lab Results   Component Value Date/Time    Color YELLOW/STRAW 08/27/2015 05:27 PM    Appearance CLEAR 08/27/2015 05:27 PM    Specific gravity 1.017 08/27/2015 05:27 PM    pH (UA) 7.0 08/27/2015 05:27 PM    Protein NEGATIVE  08/27/2015 05:27 PM    Glucose NEGATIVE  08/27/2015 05:27 PM    Ketone 40 (A) 08/27/2015 05:27 PM    Bilirubin NEGATIVE  08/27/2015 05:27 PM    Urobilinogen 0.2 08/27/2015 05:27 PM    Nitrites NEGATIVE  08/27/2015 05:27 PM    Leukocyte Esterase NEGATIVE  08/27/2015 05:27 PM    Epithelial cells FEW 08/27/2015 05:27 PM    Bacteria NEGATIVE  08/27/2015 05:27 PM    WBC 0-4 08/27/2015 05:27 PM    RBC 0-5 08/27/2015 05:27 PM         Medications Reviewed:     Current Facility-Administered Medications   Medication Dose Route Frequency    warfarin (COUMADIN) tablet 10 mg  10 mg Oral ONCE    pantoprazole (PROTONIX) tablet 40 mg  40 mg Oral DAILY    Warfarin - pharmacy to dose   Other Rx Dosing/Monitoring    aspirin chewable tablet 81 mg  81 mg Oral DAILY    atorvastatin (LIPITOR) tablet 80 mg  80 mg Oral DAILY    heparin 25,000 units in D5W 250 ml infusion  12-25 Units/kg/hr IntraVENous TITRATE    acetaminophen (TYLENOL) tablet 650 mg  650 mg Oral Q4H PRN    Or    acetaminophen (TYLENOL) solution 650 mg  650 mg Per NG tube Q4H PRN    Or    acetaminophen (TYLENOL) suppository 650 mg  650 mg Rectal Q4H PRN ______________________________________________________________________  EXPECTED LENGTH OF STAY: 2d 2h  ACTUAL LENGTH OF STAY:          6                 Nuala Layer, MD

## 2019-10-09 LAB
APTT PPP: 82.3 SEC (ref 22.1–32)
APTT PPP: 82.3 SEC (ref 22.1–32)
APTT PPP: 84.3 SEC (ref 22.1–32)
COMMENT, HOLDF: NORMAL
F5 GENE MUT ANL BLD/T: NORMAL
HCT VFR BLD AUTO: 28.1 % (ref 36.6–50.3)
HGB BLD-MCNC: 8.9 G/DL (ref 12.1–17)
INR PPP: 1.5 (ref 0.9–1.1)
PROTHROMBIN TIME: 14.9 SEC (ref 9–11.1)
SAMPLES BEING HELD,HOLD: NORMAL
THERAPEUTIC RANGE,PTTT: ABNORMAL SECS (ref 58–77)

## 2019-10-09 PROCEDURE — 74011250637 HC RX REV CODE- 250/637: Performed by: INTERNAL MEDICINE

## 2019-10-09 PROCEDURE — 74011250637 HC RX REV CODE- 250/637: Performed by: HOSPITALIST

## 2019-10-09 PROCEDURE — 85730 THROMBOPLASTIN TIME PARTIAL: CPT

## 2019-10-09 PROCEDURE — 85018 HEMOGLOBIN: CPT

## 2019-10-09 PROCEDURE — 74011250637 HC RX REV CODE- 250/637: Performed by: PSYCHIATRY & NEUROLOGY

## 2019-10-09 PROCEDURE — 74011250636 HC RX REV CODE- 250/636: Performed by: NURSE PRACTITIONER

## 2019-10-09 PROCEDURE — 36415 COLL VENOUS BLD VENIPUNCTURE: CPT

## 2019-10-09 PROCEDURE — 85610 PROTHROMBIN TIME: CPT

## 2019-10-09 PROCEDURE — 74011250637 HC RX REV CODE- 250/637: Performed by: NURSE PRACTITIONER

## 2019-10-09 PROCEDURE — 65660000000 HC RM CCU STEPDOWN

## 2019-10-09 RX ORDER — WARFARIN SODIUM 5 MG/1
10 TABLET ORAL ONCE
Status: COMPLETED | OUTPATIENT
Start: 2019-10-09 | End: 2019-10-09

## 2019-10-09 RX ADMIN — WARFARIN SODIUM 10 MG: 5 TABLET ORAL at 17:30

## 2019-10-09 RX ADMIN — BENZOCAINE AND MENTHOL 1 LOZENGE: 15; 3.6 LOZENGE ORAL at 15:23

## 2019-10-09 RX ADMIN — ASPIRIN 81 MG CHEWABLE TABLET 81 MG: 81 TABLET CHEWABLE at 09:27

## 2019-10-09 RX ADMIN — ATORVASTATIN CALCIUM 80 MG: 40 TABLET, FILM COATED ORAL at 09:33

## 2019-10-09 RX ADMIN — HEPARIN SODIUM AND DEXTROSE 20 UNITS/KG/HR: 10000; 5 INJECTION INTRAVENOUS at 11:29

## 2019-10-09 RX ADMIN — PANTOPRAZOLE SODIUM 40 MG: 40 TABLET, DELAYED RELEASE ORAL at 08:00

## 2019-10-09 RX ADMIN — BENZOCAINE AND MENTHOL 1 LOZENGE: 15; 3.6 LOZENGE ORAL at 10:56

## 2019-10-09 RX ADMIN — ACETAMINOPHEN 650 MG: 325 TABLET, FILM COATED ORAL at 09:27

## 2019-10-09 RX ADMIN — ACETAMINOPHEN 650 MG: 325 TABLET, FILM COATED ORAL at 02:21

## 2019-10-09 RX ADMIN — HEPARIN SODIUM AND DEXTROSE 20 UNITS/KG/HR: 10000; 5 INJECTION INTRAVENOUS at 02:55

## 2019-10-09 RX ADMIN — BENZOCAINE AND MENTHOL 1 LOZENGE: 15; 3.6 LOZENGE ORAL at 02:21

## 2019-10-09 NOTE — PROGRESS NOTES
Problem: Pain  Goal: *Control of Pain  Outcome: Progressing Towards Goal     Problem: Falls - Risk of  Goal: *Absence of Falls  Description  Document Nighat Stem Fall Risk and appropriate interventions in the flowsheet.   Outcome: Progressing Towards Goal  Note:   Fall Risk Interventions:  Mobility Interventions: Assess mobility with egress test, Communicate number of staff needed for ambulation/transfer, Mechanical lift, Patient to call before getting OOB, PT Consult for mobility concerns, PT Consult for assist device competence, Strengthening exercises (ROM-active/passive), Utilize walker, cane, or other assistive device         Medication Interventions: Assess postural VS orthostatic hypotension, Evaluate medications/consider consulting pharmacy, Patient to call before getting OOB, Teach patient to arise slowly    Elimination Interventions: Call light in reach, Patient to call for help with toileting needs, Stay With Me (per policy)              Problem: TIA/CVA Stroke: Day 2 Until Discharge  Goal: Activity/Safety  Outcome: Progressing Towards Goal  Goal: Diagnostic Test/Procedures  Outcome: Progressing Towards Goal  Goal: Nutrition/Diet  Outcome: Progressing Towards Goal  Goal: Discharge Planning  Outcome: Progressing Towards Goal  Goal: Medications  Outcome: Progressing Towards Goal  Goal: Respiratory  Outcome: Progressing Towards Goal  Goal: Treatments/Interventions/Procedures  Outcome: Progressing Towards Goal  Goal: Psychosocial  Outcome: Progressing Towards Goal  Goal: *Verbalizes anxiety and depression are reduced or absent  Outcome: Progressing Towards Goal  Goal: *Absence of aspiration  Outcome: Progressing Towards Goal  Goal: *Absence of deep venous thrombosis signs and symptoms(Stroke Metric)  Outcome: Progressing Towards Goal  Goal: *Optimal pain control at patient's stated goal  Outcome: Progressing Towards Goal  Goal: *Tolerating diet  Outcome: Progressing Towards Goal  Goal: *Ability to perform ADLs and demonstrates progressive mobility and function  Outcome: Progressing Towards Goal  Goal: *Stroke education continued(Stroke Metric)  Outcome: Progressing Towards Goal

## 2019-10-09 NOTE — PROGRESS NOTES
Pharmacist Note - Warfarin Dosing  Consult provided for this 60 y.o.male to manage warfarin for right carotid artery thrombus  - Recent history bilateral PE and LE DVT. INR Goal: 2 - 3    Home regimen/ tablet size: 5 mg every day (patient unsure/not compliant). 9/10/19 Eliquis changed back to warfarin due to lack of insurance    Drugs that may increase INR: None  Drugs that may decrease INR: None  Other current anticoagulants/ drugs that may increase bleeding risk: Aspirin and Heparin  Risk factors: None  Daily INR ordered: YES    Recent Labs     10/09/19  0158 10/08/19  0200 10/07/19  0100   HGB 8.9* 8.4*  --    INR 1.5* 1.3* 1.3*     Date               INR                  Dose  10/3  1.2  5 mg   10/4  1.2  5 mg  10/5  1.2  7.5 mg  10/6  1.2  7.5 mg     10/7  1.3  9 mg   10/8  1.3  10 mg  10/9  1.5  10 mg                                                                               Assessment/ Plan: Will order warfarin 10 mg PO x 1 dose. Pharmacy will continue to monitor daily and adjust therapy as indicated. Please contact the pharmacist at x [phone] or  for outpatient recommendations if needed.

## 2019-10-09 NOTE — ROUTINE PROCESS
Bedside and Verbal shift change report given to Solo Capone RN  (oncoming nurse) by Jenn Ramires RN  (offgoing nurse). Report included the following information SBAR, Kardex, ED Summary, Procedure Summary, Intake/Output, MAR and Med Rec Status. NSR.

## 2019-10-09 NOTE — PROGRESS NOTES
Problem: Pain  Goal: *Control of Pain  Outcome: Progressing Towards Goal     Problem: Falls - Risk of  Goal: *Absence of Falls  Description  Document Roula Garners Fall Risk and appropriate interventions in the flowsheet.   Outcome: Progressing Towards Goal  Note:   Fall Risk Interventions:  Mobility Interventions: Assess mobility with egress test, Communicate number of staff needed for ambulation/transfer, Mechanical lift, Patient to call before getting OOB, PT Consult for mobility concerns, PT Consult for assist device competence, Strengthening exercises (ROM-active/passive), Utilize walker, cane, or other assistive device         Medication Interventions: Assess postural VS orthostatic hypotension, Evaluate medications/consider consulting pharmacy, Patient to call before getting OOB, Teach patient to arise slowly    Elimination Interventions: Call light in reach, Patient to call for help with toileting needs, Stay With Me (per policy)              Problem: TIA/CVA Stroke: Day 2 Until Discharge  Goal: Activity/Safety  Outcome: Progressing Towards Goal  Goal: Nutrition/Diet  Outcome: Progressing Towards Goal  Goal: Medications  Outcome: Progressing Towards Goal  Goal: Respiratory  Outcome: Progressing Towards Goal  Goal: *Absence of aspiration  Outcome: Progressing Towards Goal  Goal: *Optimal pain control at patient's stated goal  Outcome: Progressing Towards Goal  Goal: *Tolerating diet  Outcome: Progressing Towards Goal  Goal: *Stroke education continued(Stroke Metric)  Outcome: Progressing Towards Goal

## 2019-10-09 NOTE — PROGRESS NOTES
Hospitalist Progress Note  Kianna Black MD  Answering service: [phone] from in house phone        Date of Service:  10/9/2019  NAME:  Parvez Odom  :  1955  MRN:  [id number]      Admission Summary: This is a 62-year-old  male with past medical history significant for bilateral lower extremity DVT and bilateral PE, on Coumadin, history of kidney stones, history of alcohol abuse and tobacco abuse, presented to [facility] Emergency Department with left hand weakness that started this morning quarter to 07:00 a.m. He stated that he had left hand weakness around 04:00 p.m. yesterday, and the weakness lasted for 3 hours and went away. This morning quarter to 07:00 a.m., he said he felt like someone put pressure on his left arm. He could not make a fist and decided to come to [facility] Department. No headache, fever, chills, sweating, left-sided chest pain, palpitation, shortness of breath, cough, abdominal pain, urinary complaint, or lower extremity weakness or swelling. No abnormal bowel movement. He took his Coumadin at 6 a.m. this morning. Interval history / Subjective:   Patient seen and evaluated.  -No complaints today. Assessment & Plan:     Symptomatic right carotid thrombus with subsequent small infarcts    -Pt presented with left hand weakness and CTA showed large wall adherent fibrofatty plaque/thrombus in the distal right common carotid artery extending to the bifurcation.  -Neurointervention consulted. Pt is on heparin drip. Will continue to follow recommendations.  -On statin. -ECHO c/w EF 56 - 60%. No regional wall motion abnormality noted.  -Educated about stroke  -As per neurology  continue on heparin drip for now,then transitions to PO AC based on neurointervention recommendations. Started coumadin 10/3  consult pharmacy for dosing   physical therapy.  Goal of INR 2-3, should have overlap 1-2 days of heparin drip when INR reach  Therapeutic.   -d/w Neurologist and NIS Dr. Cl Moser, per NIS, consider repeat CTA of head/neck in a few days ( Monday) when pt remain on therepeutic heparin drip   - Continue management as per neuro interventional surgery  -repeat CTA of neck and head today, Improvement in the caliber of thrombus in the right common carotid, internal  and external carotid arteries  -dietician consult for his coumadin   -INR 1. 5. Target 2-3    Anemia:   Hb 8.9  Monitor      Hypokalemia. -Resolved    Hypophosphatemia.   -On Neutra-Phos. am labs     Leukocytosis, 12.1 10/3 . Resolved  -The patient is afebrile, no signs or symptoms  of infection. H/o  bilateral large PE and bilateral lower extremity DVT, on 08/08/2018    -Pt took Coumadin on outpatient. Difficult to tell if he was compliant being an alcohol and not knowing when the last time he took it. On  Heparin, pharmacy consulted for coumadin dosing    Tobacco abuse.    -The patient is advised to stop smoking. H/o alcohol abuse  -Thiamine,folic acid po  -No evidence of withdrawal.     Code status:full code  DVT prophylaxis:on heparin drip+ warfarin  Care Plan discussed with: Patient/Family  Disposition: TBD, home when INR therapeutic. Need a new PCP to follow up his INR      Hospital Problems  Date Reviewed: 8/8/2018          Codes Class Noted POA    Carotid artery embolus, right ICD-10-CM: I65.21  ICD-9-CM: 433.10  10/2/2019 Unknown        * (Principal) Carotid artery thrombosis, right ICD-10-CM: I65.21  ICD-9-CM: 433.10  10/1/2019 Unknown        Stroke due to embolism [facility]) ICD-10-CM: I63.9  ICD-9-CM: 434.11  10/1/2019 Unknown        Left hand weakness ICD-10-CM: R29.898  ICD-9-CM: 728.87  9/30/2019 Unknown                Review of Systems:   A comprehensive review of systems was negative except for that written in the HPI. Vital Signs:    Last 24hrs VS reviewed since prior progress note.  Most recent are:  Visit Vitals  /61 (BP 1 Location: Right arm, BP Patient Position: At rest)   Pulse 77   Temp 97.5 °F (36.4 °C)   Resp 17   Ht 5' 10\" (1.778 m)   Wt 85.2 kg (187 lb 13.3 oz)   SpO2 100%   BMI 26.95 kg/m²       No intake or output data in the 24 hours ending 10/09/19 1633     Physical Examination:         Constitutional:  No acute distress, cooperative, pleasant    ENT:  Oral mucous moist, oropharynx benign. Resp:  CTA bilaterally. No wheezing/rhonchi/rales. No accessory muscle use   CV:  Regular rhythm, normal rate, no murmurs, gallops, rubs    GI:  Soft, non distended, non tender. normoactive bowel sounds, no hepatosplenomegaly     Musculoskeletal:  No edema, warm, 2+ pulses throughout    Neurologic: Poor hand  on left since admission. AAOx3. Psych:  Good insight, Not anxious nor agitated. Data Review:    Review and/or order of clinical lab test      Labs:     Recent Labs     10/09/19  0158 10/08/19  0200   WBC  --  8.3   HGB 8.9* 8.4*   HCT 28.1* 27.3*   PLT  --  345     Recent Labs     10/08/19  0608      K 4.3      CO2 24   BUN 6   CREA 0.97   GLU 86   CA 9.4   MG 2.0   PHOS 3.8     Recent Labs     10/08/19  0608   SGOT 55*   ALT 22      TBILI 0.4   TP 7.3   ALB 3.3*   GLOB 4.0     Recent Labs     10/09/19  0933 10/09/19  0158 10/08/19  2003  10/08/19  0200  10/07/19  0100   INR  --  1.5*  --   --  1.3*  --  1.3*   PTP  --  14.9*  --   --  12.9*  --  12.8*   APTT 82.3* 82.3* 84.8*   < > 32.2*   < > 77.2*    < > = values in this interval not displayed. No results for input(s): FE, TIBC, PSAT, FERR in the last 72 hours. Lab Results   Component Value Date/Time    Folate 4.4 (L) 08/09/2018 03:24 AM      No results for input(s): PH, PCO2, PO2 in the last 72 hours. No results for input(s): CPK, CKNDX, TROIQ in the last 72 hours.     No lab exists for component: CPKMB  Lab Results   Component Value Date/Time    Cholesterol, total 94 10/01/2019 03:50 AM    HDL Cholesterol 37 10/01/2019 03:50 AM    LDL, calculated 44.6 10/01/2019 03:50 AM    Triglyceride 62 10/01/2019 03:50 AM    CHOL/HDL Ratio 2.5 10/01/2019 03:50 AM     Lab Results   Component Value Date/Time    Glucose (POC) 138 (H) 09/30/2019 09:55 AM     Lab Results   Component Value Date/Time    Color YELLOW/STRAW 08/27/2015 05:27 PM    Appearance CLEAR 08/27/2015 05:27 PM    Specific gravity 1.017 08/27/2015 05:27 PM    pH (UA) 7.0 08/27/2015 05:27 PM    Protein NEGATIVE  08/27/2015 05:27 PM    Glucose NEGATIVE  08/27/2015 05:27 PM    Ketone 40 (A) 08/27/2015 05:27 PM    Bilirubin NEGATIVE  08/27/2015 05:27 PM    Urobilinogen 0.2 08/27/2015 05:27 PM    Nitrites NEGATIVE  08/27/2015 05:27 PM    Leukocyte Esterase NEGATIVE  08/27/2015 05:27 PM    Epithelial cells FEW 08/27/2015 05:27 PM    Bacteria NEGATIVE  08/27/2015 05:27 PM    WBC 0-4 08/27/2015 05:27 PM    RBC 0-5 08/27/2015 05:27 PM         Medications Reviewed:     Current Facility-Administered Medications   Medication Dose Route Frequency    warfarin (COUMADIN) tablet 10 mg  10 mg Oral ONCE    benzocaine-menthol (CEPACOL) lozenge 1 Lozenge  1 Lozenge Mucous Membrane Q4H PRN    pantoprazole (PROTONIX) tablet 40 mg  40 mg Oral DAILY    Warfarin - pharmacy to dose   Other Rx Dosing/Monitoring    aspirin chewable tablet 81 mg  81 mg Oral DAILY    atorvastatin (LIPITOR) tablet 80 mg  80 mg Oral DAILY    heparin 25,000 units in D5W 250 ml infusion  12-25 Units/kg/hr IntraVENous TITRATE    acetaminophen (TYLENOL) tablet 650 mg  650 mg Oral Q4H PRN    Or    acetaminophen (TYLENOL) solution 650 mg  650 mg Per NG tube Q4H PRN    Or    acetaminophen (TYLENOL) suppository 650 mg  650 mg Rectal Q4H PRN     ______________________________________________________________________  EXPECTED LENGTH OF STAY: 2d 2h  ACTUAL LENGTH OF STAY:          7                 Raheel Slaughter MD

## 2019-10-10 LAB
APTT PPP: 72.5 SEC (ref 22.1–32)
APTT PPP: 74.9 SEC (ref 22.1–32)
COMMENT, HOLDF: NORMAL
HCT VFR BLD AUTO: 27.7 % (ref 36.6–50.3)
HGB BLD-MCNC: 8.7 G/DL (ref 12.1–17)
INR PPP: 1.9 (ref 0.9–1.1)
INR PPP: 2 (ref 0.9–1.1)
PROTHROMBIN TIME: 18.2 SEC (ref 9–11.1)
PROTHROMBIN TIME: 19.4 SEC (ref 9–11.1)
SAMPLES BEING HELD,HOLD: NORMAL
THERAPEUTIC RANGE,PTTT: ABNORMAL SECS (ref 58–77)
THERAPEUTIC RANGE,PTTT: ABNORMAL SECS (ref 58–77)

## 2019-10-10 PROCEDURE — 97168 OT RE-EVAL EST PLAN CARE: CPT

## 2019-10-10 PROCEDURE — 85018 HEMOGLOBIN: CPT

## 2019-10-10 PROCEDURE — 74011250636 HC RX REV CODE- 250/636: Performed by: NURSE PRACTITIONER

## 2019-10-10 PROCEDURE — 85730 THROMBOPLASTIN TIME PARTIAL: CPT

## 2019-10-10 PROCEDURE — 36415 COLL VENOUS BLD VENIPUNCTURE: CPT

## 2019-10-10 PROCEDURE — 85610 PROTHROMBIN TIME: CPT

## 2019-10-10 PROCEDURE — 74011250637 HC RX REV CODE- 250/637: Performed by: INTERNAL MEDICINE

## 2019-10-10 PROCEDURE — 74011250637 HC RX REV CODE- 250/637: Performed by: PSYCHIATRY & NEUROLOGY

## 2019-10-10 PROCEDURE — 97110 THERAPEUTIC EXERCISES: CPT

## 2019-10-10 PROCEDURE — 74011250637 HC RX REV CODE- 250/637: Performed by: HOSPITALIST

## 2019-10-10 PROCEDURE — 74011250637 HC RX REV CODE- 250/637: Performed by: NURSE PRACTITIONER

## 2019-10-10 PROCEDURE — 65660000000 HC RM CCU STEPDOWN

## 2019-10-10 RX ADMIN — HEPARIN SODIUM AND DEXTROSE 16 UNITS/KG/HR: 10000; 5 INJECTION INTRAVENOUS at 05:27

## 2019-10-10 RX ADMIN — HEPARIN SODIUM AND DEXTROSE 16 UNITS/KG/HR: 10000; 5 INJECTION INTRAVENOUS at 23:01

## 2019-10-10 RX ADMIN — BENZOCAINE AND MENTHOL 1 LOZENGE: 15; 3.6 LOZENGE ORAL at 16:16

## 2019-10-10 RX ADMIN — ACETAMINOPHEN 650 MG: 325 TABLET, FILM COATED ORAL at 07:17

## 2019-10-10 RX ADMIN — ACETAMINOPHEN 650 MG: 325 TABLET, FILM COATED ORAL at 16:15

## 2019-10-10 RX ADMIN — ASPIRIN 81 MG CHEWABLE TABLET 81 MG: 81 TABLET CHEWABLE at 09:11

## 2019-10-10 RX ADMIN — PANTOPRAZOLE SODIUM 40 MG: 40 TABLET, DELAYED RELEASE ORAL at 09:11

## 2019-10-10 RX ADMIN — BENZOCAINE AND MENTHOL 1 LOZENGE: 15; 3.6 LOZENGE ORAL at 07:18

## 2019-10-10 RX ADMIN — WARFARIN SODIUM 9 MG: 5 TABLET ORAL at 16:15

## 2019-10-10 RX ADMIN — ACETAMINOPHEN 650 MG: 325 TABLET, FILM COATED ORAL at 00:14

## 2019-10-10 RX ADMIN — ATORVASTATIN CALCIUM 80 MG: 40 TABLET, FILM COATED ORAL at 09:11

## 2019-10-10 RX ADMIN — BENZOCAINE AND MENTHOL 1 LOZENGE: 15; 3.6 LOZENGE ORAL at 00:14

## 2019-10-10 NOTE — PROGRESS NOTES
Pharmacist Note - Warfarin Dosing  Consult provided for this 60 y.o.male to manage warfarin for right carotid artery thrombus  - Recent history bilateral PE and LE DVT. INR Goal: 2 - 3    Home regimen/ tablet size: 5 mg every day (patient unsure/not compliant). 9/10/19 Eliquis changed back to warfarin due to lack of insurance    Drugs that may increase INR: None  Drugs that may decrease INR: None  Other current anticoagulants/ drugs that may increase bleeding risk: Aspirin and Heparin  Risk factors: None  Daily INR ordered: YES    Recent Labs     10/10/19  0530 10/10/19  0003 10/09/19  0158 10/08/19  0200   HGB  --  8.7* 8.9* 8.4*   INR 2.0* 1.9* 1.5* 1.3*     Date               INR                  Dose  10/3  1.2  5 mg   10/4  1.2  5 mg  10/5  1.2  7.5 mg  10/6  1.2  7.5 mg     10/7  1.3  9 mg   10/8  1.3  10 mg  10/9  1.5  10 mg  10/10  1.9, 2.0  9 mg                                                                                 Assessment/ Plan: Will order warfarin 9 mg PO x 1 dose. Pharmacy will continue to monitor daily and adjust therapy as indicated. Please contact the pharmacist at x [phone] or  for outpatient recommendations if needed.

## 2019-10-10 NOTE — PROGRESS NOTES
Hospitalist Progress Note  Chicho Navarrete MD  Answering service: [phone] OR [phone] from in house phone        Date of Service:  10/10/2019  NAME:  Roseann Batista  :  1955  MRN:  [id number]      Admission Summary: This is a 42-year-old  male with past medical history significant for bilateral lower extremity DVT and bilateral PE, on Coumadin, history of kidney stones, history of alcohol abuse and tobacco abuse, presented to [facility] Emergency Department with left hand weakness that started this morning quarter to 07:00 a.m. He stated that he had left hand weakness around 04:00 p.m. yesterday, and the weakness lasted for 3 hours and went away. This morning quarter to 07:00 a.m., he said he felt like someone put pressure on his left arm. He could not make a fist and decided to come to [facility] Emergency Department. No headache, fever, chills, sweating, left-sided chest pain, palpitation, shortness of breath, cough, abdominal pain, urinary complaint, or lower extremity weakness or swelling. No abnormal bowel movement. He took his Coumadin at 6 a.m. this morning. Interval history / Subjective:   Patient seen and evaluated.  -Left hand  may be better today     Assessment & Plan:     Symptomatic right carotid thrombus with subsequent small infarcts    -Pt presented with left hand weakness and CTA showed large wall adherent fibrofatty plaque/thrombus in the distal right common carotid artery extending to the bifurcation.  -Neurointervention consulted. Pt is on heparin drip. Will continue to follow recommendations.  -On statin. -ECHO c/w EF 56 - 60%. No regional wall motion abnormality noted.  -Educated about stroke  -As per neurology  continue on heparin drip for now,then transitions to PO AC based on neurointervention recommendations. Started coumadin 10/3  consult pharmacy for dosing   physical therapy.  Goal of INR 2-3, should have overlap 1-2 days of heparin drip when INR reach  Therapeutic.   -d/w Neurologist and NIS Dr. Alisson Lainez, per NIS, consider repeat CTA of head/neck in a few days ( Monday) when pt remain on therepeutic heparin drip   - Continue management as per neuro interventional surgery  -repeat CTA of neck and head today, Improvement in the caliber of thrombus in the right common carotid, internal  and external carotid arteries  -dietician consult for his coumadin   -INR 2. 0. Target 2-3. NIS recommended 1-2 days of heparin overlap with therapeutic INR    Anemia:   Hb 8.7  Monitor      Hypokalemia. -Resolved    Hypophosphatemia.   -On Neutra-Phos. am labs     Leukocytosis, 12.1 10/3 . Resolved  -The patient is afebrile, no signs or symptoms  of infection. H/o  bilateral large PE and bilateral lower extremity DVT, on 08/08/2018    -Pt took Coumadin on outpatient. Difficult to tell if he was compliant being an alcohol and not knowing when the last time he took it. On  Heparin, pharmacy consulted for coumadin dosing    Tobacco abuse.    -The patient is advised to stop smoking. H/o alcohol abuse  -Thiamine,folic acid po  -No evidence of withdrawal.     Code status:full code  DVT prophylaxis:on heparin drip+ warfarin  Care Plan discussed with: Patient/Family  Disposition:   -Home with out patient/HH OT and  PT  -Home tomorrow if INR>=2.0     Hospital Problems  Date Reviewed: 8/8/2018          Codes Class Noted POA    Carotid artery embolus, right ICD-10-CM: I65.21  ICD-9-CM: 433.10  10/2/2019 Unknown        * (Principal) Carotid artery thrombosis, right ICD-10-CM: V36.66  ICD-9-CM: 433.10  10/1/2019 Unknown        Stroke due to embolism [facility]) ICD-10-CM: I63.9  ICD-9-CM: 434.11  10/1/2019 Unknown        Left hand weakness ICD-10-CM: R29.898  ICD-9-CM: 728.87  9/30/2019 Unknown                Review of Systems:   A comprehensive review of systems was negative except for that written in the HPI.        Vital Signs: Last 24hrs VS reviewed since prior progress note. Most recent are:  Visit Vitals  /71 (BP 1 Location: Right arm, BP Patient Position: At rest)   Pulse 66   Temp 97.9 °F (36.6 °C)   Resp 16   Ht 5' 10\" (1.778 m)   Wt 84.9 kg (187 lb 2.7 oz)   SpO2 100%   BMI 26.86 kg/m²       No intake or output data in the 24 hours ending 10/10/19 1750     Physical Examination:         Constitutional:  No acute distress, cooperative, pleasant    ENT:  Oral mucous moist, oropharynx benign. Resp:  CTA bilaterally. No wheezing/rhonchi/rales. No accessory muscle use   CV:  Regular rhythm, normal rate, no murmurs, gallops, rubs    GI:  Soft, non distended, non tender. normoactive bowel sounds, no hepatosplenomegaly     Musculoskeletal:  No edema, warm, 2+ pulses throughout    Neurologic: Poor hand  on left since admission. AAOx3. Psych:  Good insight, Not anxious nor agitated. Data Review:    Review and/or order of clinical lab test      Labs:     Recent Labs     10/10/19  0003 10/09/19  0158 10/08/19  0200   WBC  --   --  8.3   HGB 8.7* 8.9* 8.4*   HCT 27.7* 28.1* 27.3*   PLT  --   --  345     Recent Labs     10/08/19  0608      K 4.3      CO2 24   BUN 6   CREA 0.97   GLU 86   CA 9.4   MG 2.0   PHOS 3.8     Recent Labs     10/08/19  0608   SGOT 55*   ALT 22      TBILI 0.4   TP 7.3   ALB 3.3*   GLOB 4.0     Recent Labs     10/10/19  0530 10/10/19  0003 10/09/19  1510  10/09/19  0158   INR 2.0* 1.9*  --   --  1.5*   PTP 19.4* 18.2*  --   --  14.9*   APTT 72.5* 74.9* 84.3*   < > 82.3*    < > = values in this interval not displayed. No results for input(s): FE, TIBC, PSAT, FERR in the last 72 hours. Lab Results   Component Value Date/Time    Folate 4.4 (L) 08/09/2018 03:24 AM      No results for input(s): PH, PCO2, PO2 in the last 72 hours. No results for input(s): CPK, CKNDX, TROIQ in the last 72 hours.     No lab exists for component: CPKMB  Lab Results   Component Value Date/Time Cholesterol, total 94 10/01/2019 03:50 AM    HDL Cholesterol 37 10/01/2019 03:50 AM    LDL, calculated 44.6 10/01/2019 03:50 AM    Triglyceride 62 10/01/2019 03:50 AM    CHOL/HDL Ratio 2.5 10/01/2019 03:50 AM     Lab Results   Component Value Date/Time    Glucose (POC) 138 (H) 09/30/2019 09:55 AM     Lab Results   Component Value Date/Time    Color YELLOW/STRAW 08/27/2015 05:27 PM    Appearance CLEAR 08/27/2015 05:27 PM    Specific gravity 1.017 08/27/2015 05:27 PM    pH (UA) 7.0 08/27/2015 05:27 PM    Protein NEGATIVE  08/27/2015 05:27 PM    Glucose NEGATIVE  08/27/2015 05:27 PM    Ketone 40 (A) 08/27/2015 05:27 PM    Bilirubin NEGATIVE  08/27/2015 05:27 PM    Urobilinogen 0.2 08/27/2015 05:27 PM    Nitrites NEGATIVE  08/27/2015 05:27 PM    Leukocyte Esterase NEGATIVE  08/27/2015 05:27 PM    Epithelial cells FEW 08/27/2015 05:27 PM    Bacteria NEGATIVE  08/27/2015 05:27 PM    WBC 0-4 08/27/2015 05:27 PM    RBC 0-5 08/27/2015 05:27 PM         Medications Reviewed:     Current Facility-Administered Medications   Medication Dose Route Frequency    benzocaine-menthol (CEPACOL) lozenge 1 Lozenge  1 Lozenge Mucous Membrane Q4H PRN    pantoprazole (PROTONIX) tablet 40 mg  40 mg Oral DAILY    Warfarin - pharmacy to dose   Other Rx Dosing/Monitoring    aspirin chewable tablet 81 mg  81 mg Oral DAILY    atorvastatin (LIPITOR) tablet 80 mg  80 mg Oral DAILY    heparin 25,000 units in D5W 250 ml infusion  12-25 Units/kg/hr IntraVENous TITRATE    acetaminophen (TYLENOL) tablet 650 mg  650 mg Oral Q4H PRN    Or    acetaminophen (TYLENOL) solution 650 mg  650 mg Per NG tube Q4H PRN    Or    acetaminophen (TYLENOL) suppository 650 mg  650 mg Rectal Q4H PRN     ______________________________________________________________________  EXPECTED LENGTH OF STAY: 2d 2h  ACTUAL LENGTH OF STAY:          8                 Antionette Barrios MD

## 2019-10-10 NOTE — PROGRESS NOTES
Provided pastoral care visit to [facility] [place] 5 patient. Did not include sacramental care.     Jonathon Keen

## 2019-10-10 NOTE — PROGRESS NOTES
Problem: Pain  Goal: *Control of Pain  Outcome: Progressing Towards Goal  Goal: *PALLIATIVE CARE:  Alleviation of Pain  Outcome: Progressing Towards Goal     Problem: Falls - Risk of  Goal: *Absence of Falls  Description  Document Shanelle Thomas Fall Risk and appropriate interventions in the flowsheet. Outcome: Progressing Towards Goal  Note:   Fall Risk Interventions:  Mobility Interventions: Patient to call before getting OOB, Communicate number of staff needed for ambulation/transfer, PT Consult for assist device competence, PT Consult for mobility concerns, OT consult for ADLs         Medication Interventions: Evaluate medications/consider consulting pharmacy, Patient to call before getting OOB, Teach patient to arise slowly    Elimination Interventions: Toileting schedule/hourly rounds, Call light in reach              Problem: Pressure Injury - Risk of  Goal: *Prevention of pressure injury  Description  Document Capo Scale and appropriate interventions in the flowsheet. Outcome: Progressing Towards Goal  Note:   Pressure Injury Interventions:  Sensory Interventions: Assess changes in LOC, Assess need for specialty bed, Discuss PT/OT consult with provider, Float heels, Keep linens dry and wrinkle-free         Activity Interventions: Assess need for specialty bed, Increase time out of bed, PT/OT evaluation    Mobility Interventions: PT/OT evaluation, Float heels, Turn and reposition approx.  every two hours(pillow and wedges)    Nutrition Interventions: Document food/fluid/supplement intake                     Problem: Patient Education: Go to Patient Education Activity  Goal: Patient/Family Education  Outcome: Progressing Towards Goal     Problem: TIA/CVA Stroke: Day 2 Until Discharge  Goal: Activity/Safety  Outcome: Progressing Towards Goal  Goal: Diagnostic Test/Procedures  Outcome: Progressing Towards Goal  Goal: Nutrition/Diet  Outcome: Progressing Towards Goal  Goal: Discharge Planning  Outcome: Progressing Towards Goal  Goal: Medications  Outcome: Progressing Towards Goal  Goal: Respiratory  Outcome: Progressing Towards Goal  Goal: Treatments/Interventions/Procedures  Outcome: Progressing Towards Goal  Goal: Psychosocial  Outcome: Progressing Towards Goal  Goal: *Verbalizes anxiety and depression are reduced or absent  Outcome: Progressing Towards Goal  Goal: *Absence of aspiration  Outcome: Progressing Towards Goal  Goal: *Absence of deep venous thrombosis signs and symptoms(Stroke Metric)  Outcome: Progressing Towards Goal  Goal: *Optimal pain control at patient's stated goal  Outcome: Progressing Towards Goal  Goal: *Tolerating diet  Outcome: Progressing Towards Goal  Goal: *Ability to perform ADLs and demonstrates progressive mobility and function  Outcome: Progressing Towards Goal  Goal: *Stroke education continued(Stroke Metric)  Outcome: Progressing Towards Goal

## 2019-10-10 NOTE — PROGRESS NOTES
RAFFI:    Home with new patient follow up with [facility], appointment rescheduled for 10/17/2019, updated on AVS.      Plan is for discharge when INR is therapeutic and plan is for discharge tomorrow AM.      Gautam Staff, MSW

## 2019-10-10 NOTE — PROGRESS NOTES
Problem: Self Care Deficits Care Plan (Adult)  Goal: *Acute Goals and Plan of Care (Insert Text)  Description  FUNCTIONAL STATUS PRIOR TO ADMISSION: Patient was independent and active without use of DME.    HOME SUPPORT PRIOR TO ADMISSION: The patient lived with spouse but did not require assist. Pt indicated that spouse will not be at home upon DC. Anticipate pt will live at home, however will have friends for limited support. Occupational Therapy Goals  Re-evaluation 10/10/2019  1. Patient will participate in upper extremity therapeutic exercise/activities with supervision/set-up for 10 minutes within 7 day(s). 2.  Patient will improve their Fugl Wu score by 5 points in prep for ADLs within 7 days. Initiated 10/3/2019     1. Pt will complete LB bathing with S within 7 days. 2. Pt will complete LB dressing with S within 7 days. 3. Pt will complete UB dressing with S within 7 days. 4. Pt will complete grooming/hygeine at sink with set-up/S within 7 days. 5. Pt will complete toilet transfer with S within 7 days. 6. Pt will complete toileting hygiene with S within 7 days. 10/10/2019 1706 by Fernanda Chan OT  Outcome: Progressing Towards Goal  10/10/2019 1704 by Fernanda Chan OT  Reactivated    OCCUPATIONAL THERAPY RE-EVALUATION NEURO POPULATION  Patient: Severo Mis (44 y.o. male)  Date: 10/10/2019  Diagnosis: Left hand weakness [R29.898]  Carotid artery embolus, right [I65.21] Carotid artery thrombosis, right       Precautions: Fall  Chart, occupational therapy assessment, plan of care, and goals were reviewed. ASSESSMENT  OT re-evaluation completed as patient is experiencing LUE deficits again. Patient had demonstrated impaired L hand strength and coordination in OT evaluation on 10/3, which fully resolved by OT treatment session on 10/7, and OT plan of care was completed.   In re-evaluation today, patient demonstrates intact L shoulder and elbow strength/ AROM but decreased L wrist strength (flexion/ extension MMT 4-/5), impaired L composite / flexion strength (MMT 4-/5), impaired L finger extension (MMT 2-/5, partial AROM/ ~30 degrees from full extension), impaired fine motor coordination, and difficulty isolating grasps/ pinches. Patient attributes recurrence in deficits to infiltrated IV in L hand with swelling, which has mostly resolved, and reports deficits returned shortly after IV infiltrated. Unclear if recurrence in LUE deficits is caused by fluctuation in CVA deficits vs ?possible peripheral injury due to IV. Regardless, patient would benefit from outpatient OT hand therapy to maximize functional recovery as he performs heavy manual labor for work. Current Level of Function Impacting Discharge (ADLs): Modified independent for ADLs, using RUE to compensate for LUE deficits    Functional Outcome Measure: The patient scored Total A-D  Total A-D (Motor Function): 56/66 on the Fugl-Wu Assessment which is indicative of mild impairment in upper extremity functional status. PLAN :  Recommendations and Planned Interventions: therapeutic exercise, neuromuscular re-education, patient education, home safety training and family training/education    Frequency/Duration: Patient will be followed by occupational therapy 2 times a week to address goals. Recommendation for discharge: (in order for the patient to meet his/her long term goals)  Outpatient occupational HAND therapy follow up recommended for LUE deficits    This discharge recommendation:  Has been made in collaboration with the attending provider and/or case management       SUBJECTIVE:   Patient stated I rely on my hands a lot for work.     OBJECTIVE DATA SUMMARY:   Hospital course since last seen and reason for reevaluation: return of LUE deficits    Cognitive/Behavioral Status:  Neurologic State: Alert  Orientation Level: Oriented X4  Cognition: Follows commands; Appropriate decision making; Appropriate for age attention/concentration; Appropriate safety awareness  Perception: Appears intact  Perseveration: No perseveration noted  Safety/Judgement: Awareness of environment; Insight into deficits    Balance:  Sitting: Intact  Standing: Intact    Functional Mobility and Transfers for ADLs:      Transfers:  Sit to Stand: Independent  Stand to Sit: Independent    ADL Assessment:  Feeding: Modified independent(inferred, compensating with RUE)    Oral Facial Hygiene/Grooming: Modified Independent(inferred, compensating with RUE)    Bathing: Modified independent(inferred, compensating with RUE)    Upper Body Dressing: Modified independent(inferred, compensating with RUE)    Lower Body Dressing: Modified independent(inferred, compensating with RUE)    Toileting: Modified independent(inferred, compensating with RUE)                ADL Intervention and task modifications:     Cognitive Retraining  Safety/Judgement: Awareness of environment; Insight into deficits    Therapeutic Exercise:  Patient provided with blue foam hand sponge and print out of exercises for a variety of grasps/ pinches    Functional Measure:  Fugl-Wu Assessment of Motor Recovery after Stroke:   Reflex Activity  Flexors/Biceps/Fingers: Can be elicited  Extensors/Triceps: Can be elicited  Reflex Subtotal: 4    Volitional Movement Within Synergies  Shoulder Retraction: Full  Shoulder Elevation: Full  Shoulder Abduction (90 degrees): Full  Shoulder External Rotation: Full  Elbow Flexion: Full  Forearm Supination: Full  Shoulder Adduction/Internal Rotation: Full  Elbow Extension: Full  Forearm Pronation: Full  Subtotal: 18    Volitional Movement Mixing Synergies  Hand to Lumbar Spine: Full  Shoulder Flexion (0-90 degrees): Full  Pronation-Supination: Full  Subtotal: 6    Volitional Movement With Little or No Synergy  Shoulder Abduction (0-90 degrees): Full  Shoulder Flexion ( degrees): Full  Pronation/Supination: Full  Subtotal : 6    Normal Reflex Activity  Biceps, Triceps, Finger Flexors: Full  Subtotal : 2    Upper Extremity Total   Upper Extremity Total: 36    Wrist  Stability at 15 Degree Dorsiflexion: Partial  Repeated Dorsiflexion/ Volar Flexion: Partial  Stability at 15 Degree Dorsiflexion: Partial  Repeated Dorsiflexion/ Volar Flexion: Partial  Circumduction: Partial  Wrist Total: 5    Hand  Mass Flexion: Partial  Mass Extension: Partial  Grasp A: Partial  Grasp B: Partial  Grasp C: Partial  Grasp D: Full  Grasp E: Full  Hand Total: 9    Coordination/Speed  Tremor: None  Dysmetria: None  Time: <1s  Coordination/Speed Total : 6    Total A-D  Total A-D (Motor Function): 56/66     This is a reliable/valid measure of arm function after a neurological event. It has established value to characterize functional status and for measuring spontaneous and therapy-induced recovery; tests proximal and distal motor functions. Fugl-Wu Assessment - UE scores recorded between five and 30 days post neurologic event can be used to predict UE recovery at six months post neurologic event. Severe = 0-21 points   Moderately Severe = 22-33 points   Moderate = 34-47 points   Mild = 48-66 points  BRIAN Reddy, KAYLA Turner, & MACRINA Barrientos (1992). Measurement of motor recovery after stroke: Outcome assessment and sample size requirements.  Stroke, 23, pp. 5034-1843.   ------------------------------------------------------------------------------------------------------------------------------------------------------------------  MCID:  Stroke:   Nisa Barber, 2001; n = 171; mean age 79 (5) years; assessed within 16 (12) days of stroke, Acute Stroke)  FMA Motor Scores from Admission to Discharge   10 point increase in FMA Upper Extremity = 1.5 change in discharge FIM   10 point increase in FMA Lower Extremity = 1.9 change in discharge FIM  MDC:   Stroke:   Themalik Rodríguez et al, 2008, n = 14, mean age = 59.9 (14.6) years, assessed on average 14 (6.5) months post stroke, Chronic Stroke)   FMA = 5.2 points for the Upper Extremity portion of the assessment       Pain:  Patient did not report pain    Activity Tolerance:   VSS    After treatment patient left in no apparent distress:   Supine in bed  Call bell left within reach    COMMUNICATION/COLLABORATION:   The patients plan of care was discussed with: Registered Nurse and     Patient was educated regarding His deficit(s) of L hand impaired strength/ AROM/ coordination as this relates to His diagnosis of CVA. He demonstrated Good understanding as evidenced by verbalization. Patient and/or family was verbally educated on the BE FAST acronym for signs/symptoms of CVA and TIA. Provided with BEFAST handout. All questions answered with patient indicating good understanding.      Anjana Savage OT  Time Calculation: 38 mins

## 2019-10-10 NOTE — ROUTINE PROCESS
Bedside and Verbal shift change report given to Jeanine Burleson RN  (oncoming nurse) by Zurdo Wilson RN  (offgoing nurse). Report included the following information SBAR, Kardex, ED Summary, Procedure Summary, Intake/Output, MAR, Recent Results, Med Rec Status, Cardiac Rhythm NSR. , Alarm Parameters , Procedure Verification and Quality Measures Onur Grimm

## 2019-10-11 ENCOUNTER — APPOINTMENT (OUTPATIENT)
Dept: CT IMAGING | Age: 64
DRG: 045 | End: 2019-10-11
Attending: HOSPITALIST
Payer: MEDICAID

## 2019-10-11 LAB
APTT PPP: 66.4 SEC (ref 22.1–32)
HCT VFR BLD AUTO: 27.7 % (ref 36.6–50.3)
HGB BLD-MCNC: 8.6 G/DL (ref 12.1–17)
INR PPP: 2.2 (ref 0.9–1.1)
PROTHROMBIN TIME: 21.8 SEC (ref 9–11.1)
THERAPEUTIC RANGE,PTTT: ABNORMAL SECS (ref 58–77)

## 2019-10-11 PROCEDURE — 74011250637 HC RX REV CODE- 250/637: Performed by: INTERNAL MEDICINE

## 2019-10-11 PROCEDURE — 85730 THROMBOPLASTIN TIME PARTIAL: CPT

## 2019-10-11 PROCEDURE — 85018 HEMOGLOBIN: CPT

## 2019-10-11 PROCEDURE — 74011250637 HC RX REV CODE- 250/637: Performed by: HOSPITALIST

## 2019-10-11 PROCEDURE — 74011250637 HC RX REV CODE- 250/637: Performed by: NURSE PRACTITIONER

## 2019-10-11 PROCEDURE — 65660000000 HC RM CCU STEPDOWN

## 2019-10-11 PROCEDURE — 85610 PROTHROMBIN TIME: CPT

## 2019-10-11 PROCEDURE — 74011250637 HC RX REV CODE- 250/637: Performed by: PSYCHIATRY & NEUROLOGY

## 2019-10-11 PROCEDURE — 70450 CT HEAD/BRAIN W/O DYE: CPT

## 2019-10-11 PROCEDURE — 36415 COLL VENOUS BLD VENIPUNCTURE: CPT

## 2019-10-11 RX ADMIN — ACETAMINOPHEN 650 MG: 325 TABLET, FILM COATED ORAL at 04:09

## 2019-10-11 RX ADMIN — BENZOCAINE AND MENTHOL 1 LOZENGE: 15; 3.6 LOZENGE ORAL at 04:09

## 2019-10-11 RX ADMIN — ACETAMINOPHEN 650 MG: 325 TABLET, FILM COATED ORAL at 09:27

## 2019-10-11 RX ADMIN — ASPIRIN 81 MG CHEWABLE TABLET 81 MG: 81 TABLET CHEWABLE at 09:26

## 2019-10-11 RX ADMIN — PANTOPRAZOLE SODIUM 40 MG: 40 TABLET, DELAYED RELEASE ORAL at 09:26

## 2019-10-11 RX ADMIN — ATORVASTATIN CALCIUM 80 MG: 40 TABLET, FILM COATED ORAL at 09:26

## 2019-10-11 RX ADMIN — WARFARIN SODIUM 9 MG: 5 TABLET ORAL at 17:37

## 2019-10-11 NOTE — PROGRESS NOTES
Pt walked three laps in hallway @ 2030. Problem: Pain  Goal: *Control of Pain  Outcome: Progressing Towards Goal     Problem: Falls - Risk of  Goal: *Absence of Falls  Description  Document Ajitata Carrel Fall Risk and appropriate interventions in the flowsheet. Outcome: Progressing Towards Goal  Note:   Fall Risk Interventions:  Mobility Interventions: Utilize walker, cane, or other assistive device         Medication Interventions: Assess postural VS orthostatic hypotension, Teach patient to arise slowly    Elimination Interventions: Toileting schedule/hourly rounds, Toilet paper/wipes in reach, Call light in reach              Problem: Pressure Injury - Risk of  Goal: *Prevention of pressure injury  Description  Document Capo Scale and appropriate interventions in the flowsheet.   Outcome: Progressing Towards Goal  Note:   Pressure Injury Interventions:  Sensory Interventions: Keep linens dry and wrinkle-free, Maintain/enhance activity level, Minimize linen layers         Activity Interventions: PT/OT evaluation    Mobility Interventions: HOB 30 degrees or less, PT/OT evaluation, Pressure redistribution bed/mattress (bed type)    Nutrition Interventions: Document food/fluid/supplement intake                     Problem: Patient Education: Go to Patient Education Activity  Goal: Patient/Family Education  Outcome: Progressing Towards Goal     Problem: TIA/CVA Stroke: Day 2 Until Discharge  Goal: Activity/Safety  Outcome: Progressing Towards Goal  Goal: Diagnostic Test/Procedures  Outcome: Progressing Towards Goal  Goal: Nutrition/Diet  Outcome: Progressing Towards Goal  Goal: Discharge Planning  Outcome: Progressing Towards Goal  Goal: Medications  Outcome: Progressing Towards Goal  Goal: Respiratory  Outcome: Progressing Towards Goal  Goal: Treatments/Interventions/Procedures  Outcome: Progressing Towards Goal  Goal: Psychosocial  Outcome: Progressing Towards Goal  Goal: *Verbalizes anxiety and depression are reduced or absent  Outcome: Progressing Towards Goal  Goal: *Absence of aspiration  Outcome: Progressing Towards Goal  Goal: *Absence of deep venous thrombosis signs and symptoms(Stroke Metric)  Outcome: Progressing Towards Goal  Goal: *Optimal pain control at patient's stated goal  Outcome: Progressing Towards Goal  Goal: *Tolerating diet  Outcome: Progressing Towards Goal  Goal: *Ability to perform ADLs and demonstrates progressive mobility and function  Outcome: Progressing Towards Goal  Goal: *Stroke education continued(Stroke Metric)  Outcome: Progressing Towards Goal

## 2019-10-11 NOTE — PROGRESS NOTES
Pharmacist Note - Warfarin Dosing  Consult provided for this 60 y.o.male to manage warfarin for right carotid artery thrombus  - Recent history bilateral PE and LE DVT. INR Goal: 2 - 3    Home regimen/ tablet size: 5 mg every day (patient unsure/not compliant). 9/10/19 Eliquis changed back to warfarin due to lack of insurance    Drugs that may increase INR: None  Drugs that may decrease INR: None  Other current anticoagulants/ drugs that may increase bleeding risk: Aspirin and Heparin  Risk factors: None  Daily INR ordered: YES    Recent Labs     10/11/19  0504 10/10/19  0530 10/10/19  0003 10/09/19  0158   HGB 8.6*  --  8.7* 8.9*   INR 2.2* 2.0* 1.9* 1.5*     Date               INR                  Dose  10/3  1.2  5 mg   10/4  1.2  5 mg  10/5  1.2  7.5 mg  10/6  1.2  7.5 mg     10/7  1.3  9 mg   10/8  1.3  10 mg  10/9  1.5  10 mg  10/10  1.9, 2.0  9 mg  10/11                 2.2                     9 mg                                                                               Assessment/ Plan: Will order warfarin 9 mg PO x 1 dose. NIS recommends 1-2 days of overlap with heparin drip and therapeutic INR, per attending notes. Pharmacy will continue to monitor daily and adjust therapy as indicated. Please contact the pharmacist at x [phone] or [phone] for outpatient recommendations if needed.

## 2019-10-11 NOTE — PROGRESS NOTES
Hospitalist Progress Note  Carmen Ybarra MD  Answering service: [phone] OR [phone] from in house phone        Date of Service:  10/11/2019  NAME:  Tali Miranda  :  1955  MRN:  [id number]      Admission Summary: This is a 72-year-old  male with past medical history significant for bilateral lower extremity DVT and bilateral PE, on Coumadin, history of kidney stones, history of alcohol abuse and tobacco abuse, presented to [facility] Emergency Department with left hand weakness that started this morning quarter to 07:00 a.m. He stated that he had left hand weakness around 04:00 p.m. yesterday, and the weakness lasted for 3 hours and went away. This morning quarter to 07:00 a.m., he said he felt like someone put pressure on his left arm. He could not make a fist and decided to come to [facility] Emergency Department. No headache, fever, chills, sweating, left-sided chest pain, palpitation, shortness of breath, cough, abdominal pain, urinary complaint, or lower extremity weakness or swelling. No abnormal bowel movement. He took his Coumadin at 6 a.m. this morning. Interval history / Subjective:   Patient seen and evaluated. He is having headache. Denied diplopia,left hand weakness unchanged. he is up and walking. Stat head CT obtained,no acute findings,no hemorrhage. INR is therapeutic x2,thus heparin gtt stopped. He is uninsured,does not have pcp who could monitor INR and adjust warfarin are huge barriers to discharge. Patient has history of non compliance too. He is labile today and does not have good insight into his condition,risks of unmonitored warfarin therapy        Assessment & Plan:     Symptomatic right carotid thrombus with subsequent small infarcts    -Pt presented with left hand weakness and CTA showed large wall adherent fibrofatty plaque/thrombus in the distal right common carotid artery extending to the bifurcation.  -Neurointervention consulted. Pt is on heparin drip. Will continue to follow recommendations. -ECHO c/w EF 56 - 60%. No regional wall motion abnormality noted.  -Educated about stroke   -Follow up  CTA of neck and head showed improvement in the caliber of thrombus in the right common carotid, internal and external carotid arteries  -INR therapeutic x 2.heparin gtt stopped on 10/11    Anemia:   Hb 8.7  Monitor      Hypokalemia. -Resolved    Hypophosphatemia.   -On Neutra-Phos. am labs     Leukocytosis, 12.1 10/3 . Resolved  -The patient is afebrile, no signs or symptoms  of infection. H/o  bilateral large PE and bilateral lower extremity DVT, on 08/08/2018    -Pt took Coumadin on outpatient. Difficult to tell if he was compliant being an alcohol and not knowing when the last time he took it. -INR therapeutic. Tobacco abuse.    -The patient is advised to stop smoking. H/o alcohol abuse  -Thiamine,folic acid po  -No evidence of withdrawal.     Code status:full code  DVT prophylaxis:on heparin drip+ warfarin  Care Plan discussed with: Patient/Family  Disposition:   Barriers to discharge:Uninsured. No PCP to monitor INR therapy. Hisotry of poor compliance. Hospital Problems  Date Reviewed: 8/8/2018          Codes Class Noted POA    Carotid artery embolus, right ICD-10-CM: I65.21  ICD-9-CM: 433.10  10/2/2019 Unknown        * (Principal) Carotid artery thrombosis, right ICD-10-CM: I65.21  ICD-9-CM: 433.10  10/1/2019 Unknown        Stroke due to embolism [facility]) ICD-10-CM: I63.9  ICD-9-CM: 434.11  10/1/2019 Unknown        Left hand weakness ICD-10-CM: R29.898  ICD-9-CM: 728.87  9/30/2019 Unknown                Review of Systems:   A comprehensive review of systems was negative except for that written in the HPI. Vital Signs:    Last 24hrs VS reviewed since prior progress note.  Most recent are:  Visit Vitals  /66 (BP 1 Location: Right arm, BP Patient Position: At rest)   Pulse 76   Temp 98.2 °F (36.8 °C)   Resp 15   Ht 5' 10\" (1.778 m)   Wt 83.5 kg (184 lb)   SpO2 93%   BMI 26.40 kg/m²         Intake/Output Summary (Last 24 hours) at 10/11/2019 1728  Last data filed at 10/11/2019 0224  Gross per 24 hour   Intake 88.67 ml   Output    Net 88.67 ml        Physical Examination:         Constitutional:  No acute distress, cooperative, pleasant    ENT:  Oral mucous moist, oropharynx benign. Resp:  CTA bilaterally. No wheezing/rhonchi/rales. No accessory muscle use   CV:  Regular rhythm, normal rate, no murmurs, gallops, rubs    GI:  Soft, non distended, non tender. normoactive bowel sounds, no hepatosplenomegaly     Musculoskeletal:  No edema, warm, 2+ pulses throughout    Neurologic: Poor hand  on left since admission. AAOx3. Psych:  Good insight, Not anxious nor agitated. Data Review:    Review and/or order of clinical lab test      Labs:     Recent Labs     10/11/19  0504 10/10/19  0003   HGB 8.6* 8.7*   HCT 27.7* 27.7*     No results for input(s): NA, K, CL, CO2, BUN, CREA, GLU, CA, MG, PHOS, URICA in the last 72 hours. No results for input(s): SGOT, GPT, ALT, AP, TBIL, TBILI, TP, ALB, GLOB, GGT, AML, LPSE in the last 72 hours. No lab exists for component: AMYP, HLPSE  Recent Labs     10/11/19  0504 10/10/19  0530 10/10/19  0003   INR 2.2* 2.0* 1.9*   PTP 21.8* 19.4* 18.2*   APTT 66.4* 72.5* 74.9*      No results for input(s): FE, TIBC, PSAT, FERR in the last 72 hours. Lab Results   Component Value Date/Time    Folate 4.4 (L) 08/09/2018 03:24 AM      No results for input(s): PH, PCO2, PO2 in the last 72 hours. No results for input(s): CPK, CKNDX, TROIQ in the last 72 hours.     No lab exists for component: CPKMB  Lab Results   Component Value Date/Time    Cholesterol, total 94 10/01/2019 03:50 AM    HDL Cholesterol 37 10/01/2019 03:50 AM    LDL, calculated 44.6 10/01/2019 03:50 AM    Triglyceride 62 10/01/2019 03:50 AM    CHOL/HDL Ratio 2.5 10/01/2019 03:50 AM     Lab Results Component Value Date/Time    Glucose (POC) 138 (H) 09/30/2019 09:55 AM     Lab Results   Component Value Date/Time    Color YELLOW/STRAW 08/27/2015 05:27 PM    Appearance CLEAR 08/27/2015 05:27 PM    Specific gravity 1.017 08/27/2015 05:27 PM    pH (UA) 7.0 08/27/2015 05:27 PM    Protein NEGATIVE  08/27/2015 05:27 PM    Glucose NEGATIVE  08/27/2015 05:27 PM    Ketone 40 (A) 08/27/2015 05:27 PM    Bilirubin NEGATIVE  08/27/2015 05:27 PM    Urobilinogen 0.2 08/27/2015 05:27 PM    Nitrites NEGATIVE  08/27/2015 05:27 PM    Leukocyte Esterase NEGATIVE  08/27/2015 05:27 PM    Epithelial cells FEW 08/27/2015 05:27 PM    Bacteria NEGATIVE  08/27/2015 05:27 PM    WBC 0-4 08/27/2015 05:27 PM    RBC 0-5 08/27/2015 05:27 PM         Medications Reviewed:     Current Facility-Administered Medications   Medication Dose Route Frequency    warfarin (COUMADIN) tablet 9 mg  9 mg Oral ONCE    benzocaine-menthol (CEPACOL) lozenge 1 Lozenge  1 Lozenge Mucous Membrane Q4H PRN    pantoprazole (PROTONIX) tablet 40 mg  40 mg Oral DAILY    Warfarin - pharmacy to dose   Other Rx Dosing/Monitoring    aspirin chewable tablet 81 mg  81 mg Oral DAILY    atorvastatin (LIPITOR) tablet 80 mg  80 mg Oral DAILY    acetaminophen (TYLENOL) tablet 650 mg  650 mg Oral Q4H PRN    Or    acetaminophen (TYLENOL) solution 650 mg  650 mg Per NG tube Q4H PRN    Or    acetaminophen (TYLENOL) suppository 650 mg  650 mg Rectal Q4H PRN     ______________________________________________________________________  EXPECTED LENGTH OF STAY: 2d 2h  ACTUAL LENGTH OF STAY:          9                 Kianna Black MD

## 2019-10-11 NOTE — PROGRESS NOTES
Problem: Pain  Goal: *Control of Pain  Outcome: Progressing Towards Goal     Problem: Falls - Risk of  Goal: *Absence of Falls  Description  Document Michael Dobson Fall Risk and appropriate interventions in the flowsheet. Outcome: Progressing Towards Goal  Note:   Fall Risk Interventions:  Mobility Interventions: Communicate number of staff needed for ambulation/transfer, OT consult for ADLs, Patient to call before getting OOB, PT Consult for mobility concerns, PT Consult for assist device competence         Medication Interventions: Evaluate medications/consider consulting pharmacy, Patient to call before getting OOB, Teach patient to arise slowly    Elimination Interventions: Call light in reach, Elevated toilet seat, Patient to call for help with toileting needs, Stay With Me (per policy)              Problem: Pressure Injury - Risk of  Goal: *Prevention of pressure injury  Description  Document Capo Scale and appropriate interventions in the flowsheet.   Outcome: Progressing Towards Goal  Note:   Pressure Injury Interventions:  Sensory Interventions: Discuss PT/OT consult with provider, Float heels         Activity Interventions: Increase time out of bed, Pressure redistribution bed/mattress(bed type), PT/OT evaluation    Mobility Interventions: HOB 30 degrees or less, Pressure redistribution bed/mattress (bed type), PT/OT evaluation    Nutrition Interventions: Offer support with meals,snacks and hydration                     Problem: Patient Education: Go to Patient Education Activity  Goal: Patient/Family Education  Outcome: Progressing Towards Goal     Problem: TIA/CVA Stroke: Day 2 Until Discharge  Goal: Off Pathway (Use only if patient is Off Pathway)  Outcome: Progressing Towards Goal  Goal: Activity/Safety  Outcome: Progressing Towards Goal  Goal: Diagnostic Test/Procedures  Outcome: Progressing Towards Goal  Goal: Nutrition/Diet  Outcome: Progressing Towards Goal  Goal: Discharge Planning  Outcome: Progressing Towards Goal  Goal: Medications  Outcome: Progressing Towards Goal  Goal: Respiratory  Outcome: Progressing Towards Goal  Goal: Treatments/Interventions/Procedures  Outcome: Progressing Towards Goal  Goal: Psychosocial  Outcome: Progressing Towards Goal  Goal: *Verbalizes anxiety and depression are reduced or absent  Outcome: Progressing Towards Goal  Goal: *Absence of aspiration  Outcome: Progressing Towards Goal  Goal: *Absence of deep venous thrombosis signs and symptoms(Stroke Metric)  Outcome: Progressing Towards Goal  Goal: *Optimal pain control at patient's stated goal  Outcome: Progressing Towards Goal  Goal: *Tolerating diet  Outcome: Progressing Towards Goal  Goal: *Ability to perform ADLs and demonstrates progressive mobility and function  Outcome: Progressing Towards Goal  Goal: *Stroke education continued(Stroke Metric)  Outcome: Progressing Towards Goal     Problem: Ischemic Stroke: Discharge Outcomes  Goal: *Verbalizes anxiety and depression are reduced or absent  Outcome: Progressing Towards Goal  Goal: *Verbalize understanding of risk factor modification(Stroke Metric)  Outcome: Progressing Towards Goal  Goal: *Hemodynamically stable  Outcome: Progressing Towards Goal  Goal: *Absence of aspiration pneumonia  Outcome: Progressing Towards Goal  Goal: *Aware of needed dietary changes  Outcome: Progressing Towards Goal  Goal: *Verbalize understanding of prescribed medications including anti-coagulants, anti-lipid, and/or anti-platelets(Stroke Metric)  Outcome: Progressing Towards Goal  Goal: *Tolerating diet  Outcome: Progressing Towards Goal  Goal: *Aware of follow-up diagnostics related to anticoagulants  Outcome: Progressing Towards Goal  Goal: *Ability to perform ADLs and demonstrates progressive mobility and function  Outcome: Progressing Towards Goal  Goal: *Absence of DVT(Stroke Metric)  Outcome: Progressing Towards Goal  Goal: *Absence of aspiration  Outcome: Progressing Towards Goal  Goal: *Optimal pain control at patient's stated goal  Outcome: Progressing Towards Goal  Goal: *Home safety concerns addressed  Outcome: Progressing Towards Goal  Goal: *Describes available resources and support systems  Outcome: Progressing Towards Goal  Goal: *Verbalizes understanding of activation of EMS(911) for stroke symptoms(Stroke Metric)  Outcome: Progressing Towards Goal  Goal: *Understands and describes signs and symptoms to report to providers(Stroke Metric)  Outcome: Progressing Towards Goal  Goal: *Neurolgocially stable (absence of additional neurological deficits)  Outcome: Progressing Towards Goal  Goal: *Verbalizes importance of follow-up with primary care physician(Stroke Metric)  Outcome: Progressing Towards Goal  Goal: *Smoking cessation discussed,if applicable(Stroke Metric)  Outcome: Progressing Towards Goal  Goal: *Depression screening completed(Stroke Metric)  Outcome: Progressing Towards Goal     Problem: Patient Education: Go to Patient Education Activity  Goal: Patient/Family Education  Outcome: Progressing Towards Goal     Problem: Patient Education: Go to Patient Education Activity  Goal: Patient/Family Education  Outcome: Progressing Towards Goal

## 2019-10-11 NOTE — ROUTINE PROCESS
Bedside shift change report given to [address] [place] [address] Road (oncoming nurse) by Kimmie Guillen RN (offgoing nurse). Report included the following information SBAR, Kardex, ED Summary, Procedure Summary, Intake/Output, MAR, Accordion, Recent Results and Cardiac Rhythm NSR.

## 2019-10-12 LAB
APTT PPP: 41 SEC (ref 22.1–32)
HCT VFR BLD AUTO: 28.5 % (ref 36.6–50.3)
HGB BLD-MCNC: 8.9 G/DL (ref 12.1–17)
INR PPP: 2.3 (ref 0.9–1.1)
PROTHROMBIN TIME: 22.4 SEC (ref 9–11.1)
THERAPEUTIC RANGE,PTTT: ABNORMAL SECS (ref 58–77)

## 2019-10-12 PROCEDURE — 74011250637 HC RX REV CODE- 250/637: Performed by: NURSE PRACTITIONER

## 2019-10-12 PROCEDURE — 74011250637 HC RX REV CODE- 250/637: Performed by: PSYCHIATRY & NEUROLOGY

## 2019-10-12 PROCEDURE — 74011250637 HC RX REV CODE- 250/637: Performed by: HOSPITALIST

## 2019-10-12 PROCEDURE — 85610 PROTHROMBIN TIME: CPT

## 2019-10-12 PROCEDURE — 65660000000 HC RM CCU STEPDOWN

## 2019-10-12 PROCEDURE — 85730 THROMBOPLASTIN TIME PARTIAL: CPT

## 2019-10-12 PROCEDURE — 85018 HEMOGLOBIN: CPT

## 2019-10-12 PROCEDURE — 36415 COLL VENOUS BLD VENIPUNCTURE: CPT

## 2019-10-12 RX ADMIN — ASPIRIN 81 MG CHEWABLE TABLET 81 MG: 81 TABLET CHEWABLE at 08:19

## 2019-10-12 RX ADMIN — ATORVASTATIN CALCIUM 80 MG: 40 TABLET, FILM COATED ORAL at 08:19

## 2019-10-12 RX ADMIN — WARFARIN SODIUM 7.5 MG: 2.5 TABLET ORAL at 17:44

## 2019-10-12 RX ADMIN — PANTOPRAZOLE SODIUM 40 MG: 40 TABLET, DELAYED RELEASE ORAL at 08:19

## 2019-10-12 NOTE — PROGRESS NOTES
Hospitalist Progress Note  Danica Galvan MD  Answering service: [phone] from in house phone        Date of Service:  10/12/2019  NAME:  Virginia Bolton  :  1955  MRN:  [id number]      Admission Summary: This is a 60-year-old  male with past medical history significant for bilateral lower extremity DVT and bilateral PE, on Coumadin, history of kidney stones, history of alcohol abuse and tobacco abuse, presented to [facility] Emergency Department with left hand weakness that started this morning quarter to 07:00 a.m. He stated that he had left hand weakness around 04:00 p.m. yesterday, and the weakness lasted for 3 hours and went away. This morning quarter to 07:00 a.m., he said he felt like someone put pressure on his left arm. He could not make a fist and decided to come to [facility] Emergency Department. No headache, fever, chills, sweating, left-sided chest pain, palpitation, shortness of breath, cough, abdominal pain, urinary complaint, or lower extremity weakness or swelling. No abnormal bowel movement. He took his Coumadin at 6 a.m. this morning. Interval history / Subjective:   Patient seen and evaluated,sleepy but arousable. NO complaints. Denied headache. Assessment & Plan:     Symptomatic right carotid thrombus with subsequent small infarcts    -Pt presented with left hand weakness and CTA showed large wall adherent fibrofatty plaque/thrombus in the distal right common carotid artery extending to the bifurcation.  -Neurointervention consulted. Pt is on heparin drip. Will continue to follow recommendations. -ECHO c/w EF 56 - 60%.  No regional wall motion abnormality noted.  -Educated about stroke   -Follow up  CTA of neck and head showed improvement in the caliber of thrombus in the right common carotid, internal and external carotid arteries  -INR therapeutic x 2.heparin gtt stopped on 10/11    Anemia:   Hb stable. Monitor      Hypokalemia. -Resolved    Hypophosphatemia.   -On Neutra-Phos. am labs     Leukocytosis, 12.1 10/3 . Resolved  -The patient is afebrile, no signs or symptoms  of infection. H/o  bilateral large PE and bilateral lower extremity DVT, on 08/08/2018    -Pt took Coumadin on outpatient. Difficult to tell if he was compliant being an alcohol and not knowing when the last time he took it. -INR therapeutic. Tobacco abuse.    -The patient is advised to stop smoking. H/o alcohol abuse  -Thiamine,folic acid po  -No evidence of withdrawal.     Code status:full code  DVT prophylaxis:on heparin drip+ warfarin  Care Plan discussed with: Patient/Family  Disposition:   Barriers to discharge:Uninsured. No PCP to monitor INR therapy. Hisotry of poor compliance. Hospital Problems  Date Reviewed: 8/8/2018          Codes Class Noted POA    Carotid artery embolus, right ICD-10-CM: I65.21  ICD-9-CM: 433.10  10/2/2019 Unknown        * (Principal) Carotid artery thrombosis, right ICD-10-CM: I65.21  ICD-9-CM: 433.10  10/1/2019 Unknown        Stroke due to embolism [facility]) ICD-10-CM: I63.9  ICD-9-CM: 434.11  10/1/2019 Unknown        Left hand weakness ICD-10-CM: R29.898  ICD-9-CM: 728.87  9/30/2019 Unknown                Review of Systems:   A comprehensive review of systems was negative except for that written in the HPI. Vital Signs:    Last 24hrs VS reviewed since prior progress note. Most recent are:  Visit Vitals  BP 94/53 (BP 1 Location: Right arm, BP Patient Position: At rest)   Pulse 64   Temp 98.5 °F (36.9 °C)   Resp 13   Ht 5' 10\" (1.778 m)   Wt 83.5 kg (184 lb 1.4 oz)   SpO2 94%   BMI 26.41 kg/m²       No intake or output data in the 24 hours ending 10/12/19 1050     Physical Examination:         Constitutional:  No acute distress, cooperative, pleasant    ENT:  Oral mucous moist, oropharynx benign. Resp:  CTA bilaterally. No wheezing/rhonchi/rales.  No accessory muscle use   CV:  Regular rhythm, normal rate, no murmurs, gallops, rubs    GI:  Soft, non distended, non tender. normoactive bowel sounds, no hepatosplenomegaly     Musculoskeletal:  No edema, warm, 2+ pulses throughout    Neurologic: Poor hand  on left since admission. AAOx3. Psych:  Good insight, Not anxious nor agitated. Data Review:    Review and/or order of clinical lab test      Labs:     Recent Labs     10/12/19  0505 10/11/19  0504   HGB 8.9* 8.6*   HCT 28.5* 27.7*     No results for input(s): NA, K, CL, CO2, BUN, CREA, GLU, CA, MG, PHOS, URICA in the last 72 hours. No results for input(s): SGOT, GPT, ALT, AP, TBIL, TBILI, TP, ALB, GLOB, GGT, AML, LPSE in the last 72 hours. No lab exists for component: AMYP, HLPSE  Recent Labs     10/12/19  0505 10/11/19  0504 10/10/19  0530   INR 2.3* 2.2* 2.0*   PTP 22.4* 21.8* 19.4*   APTT 41.0* 66.4* 72.5*      No results for input(s): FE, TIBC, PSAT, FERR in the last 72 hours. Lab Results   Component Value Date/Time    Folate 4.4 (L) 08/09/2018 03:24 AM      No results for input(s): PH, PCO2, PO2 in the last 72 hours. No results for input(s): CPK, CKNDX, TROIQ in the last 72 hours.     No lab exists for component: CPKMB  Lab Results   Component Value Date/Time    Cholesterol, total 94 10/01/2019 03:50 AM    HDL Cholesterol 37 10/01/2019 03:50 AM    LDL, calculated 44.6 10/01/2019 03:50 AM    Triglyceride 62 10/01/2019 03:50 AM    CHOL/HDL Ratio 2.5 10/01/2019 03:50 AM     Lab Results   Component Value Date/Time    Glucose (POC) 138 (H) 09/30/2019 09:55 AM     Lab Results   Component Value Date/Time    Color YELLOW/STRAW 08/27/2015 05:27 PM    Appearance CLEAR 08/27/2015 05:27 PM    Specific gravity 1.017 08/27/2015 05:27 PM    pH (UA) 7.0 08/27/2015 05:27 PM    Protein NEGATIVE  08/27/2015 05:27 PM    Glucose NEGATIVE  08/27/2015 05:27 PM    Ketone 40 (A) 08/27/2015 05:27 PM    Bilirubin NEGATIVE  08/27/2015 05:27 PM    Urobilinogen 0.2 08/27/2015 05:27 PM Nitrites NEGATIVE  08/27/2015 05:27 PM    Leukocyte Esterase NEGATIVE  08/27/2015 05:27 PM    Epithelial cells FEW 08/27/2015 05:27 PM    Bacteria NEGATIVE  08/27/2015 05:27 PM    WBC 0-4 08/27/2015 05:27 PM    RBC 0-5 08/27/2015 05:27 PM         Medications Reviewed:     Current Facility-Administered Medications   Medication Dose Route Frequency    warfarin (COUMADIN) tablet 7.5 mg  7.5 mg Oral ONCE    benzocaine-menthol (CEPACOL) lozenge 1 Lozenge  1 Lozenge Mucous Membrane Q4H PRN    pantoprazole (PROTONIX) tablet 40 mg  40 mg Oral DAILY    Warfarin - pharmacy to dose   Other Rx Dosing/Monitoring    aspirin chewable tablet 81 mg  81 mg Oral DAILY    atorvastatin (LIPITOR) tablet 80 mg  80 mg Oral DAILY    acetaminophen (TYLENOL) tablet 650 mg  650 mg Oral Q4H PRN    Or    acetaminophen (TYLENOL) solution 650 mg  650 mg Per NG tube Q4H PRN    Or    acetaminophen (TYLENOL) suppository 650 mg  650 mg Rectal Q4H PRN     ______________________________________________________________________  EXPECTED LENGTH OF STAY: 2d 2h  ACTUAL LENGTH OF STAY:          10                 Harshal Mendenhall MD

## 2019-10-12 NOTE — PROGRESS NOTES
Problem: Pain  Goal: *Control of Pain  Outcome: Progressing Towards Goal     Problem: Falls - Risk of  Goal: *Absence of Falls  Description  Document Kaitlynn Patches Fall Risk and appropriate interventions in the flowsheet. Outcome: Progressing Towards Goal  Note:   Fall Risk Interventions:  Mobility Interventions: Patient to call before getting OOB         Medication Interventions: Evaluate medications/consider consulting pharmacy, Patient to call before getting OOB, Teach patient to arise slowly    Elimination Interventions: Call light in reach              Problem: Pressure Injury - Risk of  Goal: *Prevention of pressure injury  Description  Document Capo Scale and appropriate interventions in the flowsheet.   Outcome: Progressing Towards Goal  Note:   Pressure Injury Interventions:  Sensory Interventions: Float heels         Activity Interventions: PT/OT evaluation    Mobility Interventions: PT/OT evaluation, Pressure redistribution bed/mattress (bed type), HOB 30 degrees or less    Nutrition Interventions: Offer support with meals,snacks and hydration                     Problem: Patient Education: Go to Patient Education Activity  Goal: Patient/Family Education  Outcome: Progressing Towards Goal     Problem: TIA/CVA Stroke: Day 2 Until Discharge  Goal: Off Pathway (Use only if patient is Off Pathway)  Outcome: Progressing Towards Goal  Goal: Activity/Safety  Outcome: Progressing Towards Goal  Goal: Diagnostic Test/Procedures  Outcome: Progressing Towards Goal  Goal: Nutrition/Diet  Outcome: Progressing Towards Goal  Goal: Discharge Planning  Outcome: Progressing Towards Goal  Goal: Medications  Outcome: Progressing Towards Goal  Goal: Respiratory  Outcome: Progressing Towards Goal  Goal: Treatments/Interventions/Procedures  Outcome: Progressing Towards Goal  Goal: Psychosocial  Outcome: Progressing Towards Goal  Goal: *Verbalizes anxiety and depression are reduced or absent  Outcome: Progressing Towards Goal  Goal: *Absence of aspiration  Outcome: Progressing Towards Goal  Goal: *Absence of deep venous thrombosis signs and symptoms(Stroke Metric)  Outcome: Progressing Towards Goal  Goal: *Optimal pain control at patient's stated goal  Outcome: Progressing Towards Goal  Goal: *Tolerating diet  Outcome: Progressing Towards Goal  Goal: *Ability to perform ADLs and demonstrates progressive mobility and function  Outcome: Progressing Towards Goal  Goal: *Stroke education continued(Stroke Metric)  Outcome: Progressing Towards Goal     Problem: Ischemic Stroke: Discharge Outcomes  Goal: *Verbalizes anxiety and depression are reduced or absent  Outcome: Progressing Towards Goal  Goal: *Verbalize understanding of risk factor modification(Stroke Metric)  Outcome: Progressing Towards Goal  Goal: *Hemodynamically stable  Outcome: Progressing Towards Goal  Goal: *Absence of aspiration pneumonia  Outcome: Progressing Towards Goal  Goal: *Aware of needed dietary changes  Outcome: Progressing Towards Goal  Goal: *Verbalize understanding of prescribed medications including anti-coagulants, anti-lipid, and/or anti-platelets(Stroke Metric)  Outcome: Progressing Towards Goal  Goal: *Tolerating diet  Outcome: Progressing Towards Goal  Goal: *Aware of follow-up diagnostics related to anticoagulants  Outcome: Progressing Towards Goal  Goal: *Ability to perform ADLs and demonstrates progressive mobility and function  Outcome: Progressing Towards Goal  Goal: *Absence of DVT(Stroke Metric)  Outcome: Progressing Towards Goal  Goal: *Absence of aspiration  Outcome: Progressing Towards Goal  Goal: *Optimal pain control at patient's stated goal  Outcome: Progressing Towards Goal  Goal: *Home safety concerns addressed  Outcome: Progressing Towards Goal  Goal: *Describes available resources and support systems  Outcome: Progressing Towards Goal  Goal: *Verbalizes understanding of activation of EMS(911) for stroke symptoms(Stroke Metric)  Outcome: Progressing Towards Goal  Goal: *Understands and describes signs and symptoms to report to providers(Stroke Metric)  Outcome: Progressing Towards Goal  Goal: *Neurolgocially stable (absence of additional neurological deficits)  Outcome: Progressing Towards Goal  Goal: *Verbalizes importance of follow-up with primary care physician(Stroke Metric)  Outcome: Progressing Towards Goal  Goal: *Smoking cessation discussed,if applicable(Stroke Metric)  Outcome: Progressing Towards Goal  Goal: *Depression screening completed(Stroke Metric)  Outcome: Progressing Towards Goal     Problem: Patient Education: Go to Patient Education Activity  Goal: Patient/Family Education  Outcome: Progressing Towards Goal     Problem: Patient Education: Go to Patient Education Activity  Goal: Patient/Family Education  Outcome: Progressing Towards Goal

## 2019-10-12 NOTE — PROGRESS NOTES
Problem: Pain  Goal: *Control of Pain  Outcome: Progressing Towards Goal     Problem: Falls - Risk of  Goal: *Absence of Falls  Description  Document Deric Stewart Fall Risk and appropriate interventions in the flowsheet. Outcome: Progressing Towards Goal  Note:   Fall Risk Interventions:  Mobility Interventions: Communicate number of staff needed for ambulation/transfer, OT consult for ADLs, Patient to call before getting OOB, PT Consult for mobility concerns, PT Consult for assist device competence         Medication Interventions: Evaluate medications/consider consulting pharmacy, Patient to call before getting OOB, Teach patient to arise slowly    Elimination Interventions: Call light in reach, Elevated toilet seat, Patient to call for help with toileting needs, Stay With Me (per policy)              Problem: Pressure Injury - Risk of  Goal: *Prevention of pressure injury  Description  Document Capo Scale and appropriate interventions in the flowsheet.   Outcome: Progressing Towards Goal  Note:   Pressure Injury Interventions:  Sensory Interventions: Discuss PT/OT consult with provider, Float heels         Activity Interventions: Increase time out of bed, Pressure redistribution bed/mattress(bed type), PT/OT evaluation    Mobility Interventions: HOB 30 degrees or less, Pressure redistribution bed/mattress (bed type), PT/OT evaluation    Nutrition Interventions: Offer support with meals,snacks and hydration                     Problem: Patient Education: Go to Patient Education Activity  Goal: Patient/Family Education  Outcome: Progressing Towards Goal     Problem: TIA/CVA Stroke: Day 2 Until Discharge  Goal: Activity/Safety  Outcome: Progressing Towards Goal  Goal: Diagnostic Test/Procedures  Outcome: Progressing Towards Goal  Goal: Nutrition/Diet  Outcome: Progressing Towards Goal  Goal: Discharge Planning  Outcome: Progressing Towards Goal  Goal: Medications  Outcome: Progressing Towards Goal  Goal: Respiratory  Outcome: Progressing Towards Goal  Goal: Treatments/Interventions/Procedures  Outcome: Progressing Towards Goal  Goal: Psychosocial  Outcome: Progressing Towards Goal  Goal: *Verbalizes anxiety and depression are reduced or absent  Outcome: Progressing Towards Goal  Goal: *Absence of aspiration  Outcome: Progressing Towards Goal  Goal: *Absence of deep venous thrombosis signs and symptoms(Stroke Metric)  Outcome: Progressing Towards Goal  Goal: *Optimal pain control at patient's stated goal  Outcome: Progressing Towards Goal  Goal: *Tolerating diet  Outcome: Progressing Towards Goal  Goal: *Ability to perform ADLs and demonstrates progressive mobility and function  Outcome: Progressing Towards Goal  Goal: *Stroke education continued(Stroke Metric)  Outcome: Progressing Towards Goal     Problem: Patient Education: Go to Patient Education Activity  Goal: Patient/Family Education  Outcome: Progressing Towards Goal     Problem: Patient Education: Go to Patient Education Activity  Goal: Patient/Family Education  Outcome: Progressing Towards Goal

## 2019-10-12 NOTE — PROGRESS NOTES
Pharmacist Note - Warfarin Dosing  Consult provided for this 60 y.o.male to manage warfarin for right carotid artery thrombus  - Recent history bilateral PE and LE DVT. INR Goal: 2 - 3    Home regimen/ tablet size: 5 mg every day (patient unsure/not compliant). 9/10/19 Eliquis changed back to warfarin due to lack of insurance    Drugs that may increase INR: None  Drugs that may decrease INR: None  Other current anticoagulants/ drugs that may increase bleeding risk: Aspirin  Risk factors: None  Daily INR ordered: YES    Recent Labs     10/12/19  0505 10/11/19  0504 10/10/19  0530 10/10/19  0003   HGB 8.9* 8.6*  --  8.7*   INR 2.3* 2.2* 2.0* 1.9*     Date               INR                  Dose  10/3  1.2  5 mg   10/4  1.2  5 mg  10/5  1.2  7.5 mg  10/6  1.2  7.5 mg     10/7  1.3  9 mg   10/8  1.3  10 mg  10/9  1.5  10 mg  10/10  1.9, 2.0  9 mg  10/11                 2.2                     9 mg  10/12  2.3  7.5 mg                                                                                 Assessment/ Plan: Will order warfarin 9 mg PO x 1 dose. NIS recommends 1-2 days of overlap with heparin drip and therapeutic INR, per attending notes. Pharmacy will continue to monitor daily and adjust therapy as indicated. Please contact the pharmacist at x [phone] or [phone] for outpatient recommendations if needed.

## 2019-10-13 LAB
APTT PPP: 40 SEC (ref 22.1–32)
HCT VFR BLD AUTO: 29.8 % (ref 36.6–50.3)
HGB BLD-MCNC: 9.5 G/DL (ref 12.1–17)
INR PPP: 2 (ref 0.9–1.1)
PROTHROMBIN TIME: 19.9 SEC (ref 9–11.1)
THERAPEUTIC RANGE,PTTT: ABNORMAL SECS (ref 58–77)

## 2019-10-13 PROCEDURE — 74011250637 HC RX REV CODE- 250/637: Performed by: HOSPITALIST

## 2019-10-13 PROCEDURE — 74011250637 HC RX REV CODE- 250/637: Performed by: PSYCHIATRY & NEUROLOGY

## 2019-10-13 PROCEDURE — 36415 COLL VENOUS BLD VENIPUNCTURE: CPT

## 2019-10-13 PROCEDURE — 65660000000 HC RM CCU STEPDOWN

## 2019-10-13 PROCEDURE — 85610 PROTHROMBIN TIME: CPT

## 2019-10-13 PROCEDURE — 85018 HEMOGLOBIN: CPT

## 2019-10-13 PROCEDURE — 74011250637 HC RX REV CODE- 250/637: Performed by: INTERNAL MEDICINE

## 2019-10-13 PROCEDURE — 85730 THROMBOPLASTIN TIME PARTIAL: CPT

## 2019-10-13 PROCEDURE — 74011250637 HC RX REV CODE- 250/637: Performed by: NURSE PRACTITIONER

## 2019-10-13 RX ADMIN — ATORVASTATIN CALCIUM 80 MG: 40 TABLET, FILM COATED ORAL at 08:28

## 2019-10-13 RX ADMIN — WARFARIN SODIUM 9 MG: 5 TABLET ORAL at 18:02

## 2019-10-13 RX ADMIN — PANTOPRAZOLE SODIUM 40 MG: 40 TABLET, DELAYED RELEASE ORAL at 08:28

## 2019-10-13 RX ADMIN — BENZOCAINE AND MENTHOL 1 LOZENGE: 15; 3.6 LOZENGE ORAL at 03:50

## 2019-10-13 RX ADMIN — ASPIRIN 81 MG CHEWABLE TABLET 81 MG: 81 TABLET CHEWABLE at 08:28

## 2019-10-13 RX ADMIN — ACETAMINOPHEN 650 MG: 325 TABLET, FILM COATED ORAL at 03:50

## 2019-10-13 NOTE — ROUTINE PROCESS
Bedside shift change report given to Chelsea Lima (oncoming nurse) by Dora Pimentel RN (offgoing nurse). Report included the following information SBAR, Kardex, ED Summary, Procedure Summary, Intake/Output, MAR, Accordion, Recent Results and Cardiac Rhythm NSR.

## 2019-10-13 NOTE — PROGRESS NOTES
Problem: Pain  Goal: *Control of Pain  Outcome: Progressing Towards Goal     Problem: Falls - Risk of  Goal: *Absence of Falls  Description  Document Deanna Reardon Fall Risk and appropriate interventions in the flowsheet. Outcome: Progressing Towards Goal  Note:   Fall Risk Interventions:  Mobility Interventions: Patient to call before getting OOB         Medication Interventions: Evaluate medications/consider consulting pharmacy, Patient to call before getting OOB, Teach patient to arise slowly    Elimination Interventions: Call light in reach              Problem: Pressure Injury - Risk of  Goal: *Prevention of pressure injury  Description  Document Capo Scale and appropriate interventions in the flowsheet.   Outcome: Progressing Towards Goal  Note:   Pressure Injury Interventions:  Sensory Interventions: Float heels         Activity Interventions: PT/OT evaluation    Mobility Interventions: PT/OT evaluation, Pressure redistribution bed/mattress (bed type), HOB 30 degrees or less    Nutrition Interventions: Offer support with meals,snacks and hydration                     Problem: TIA/CVA Stroke: Day 2 Until Discharge  Goal: Activity/Safety  Outcome: Progressing Towards Goal  Goal: Diagnostic Test/Procedures  Outcome: Progressing Towards Goal  Goal: Nutrition/Diet  Outcome: Progressing Towards Goal  Goal: Discharge Planning  Outcome: Progressing Towards Goal  Goal: Medications  Outcome: Progressing Towards Goal  Goal: Respiratory  Outcome: Progressing Towards Goal  Goal: Treatments/Interventions/Procedures  Outcome: Progressing Towards Goal  Goal: Psychosocial  Outcome: Progressing Towards Goal  Goal: *Verbalizes anxiety and depression are reduced or absent  Outcome: Progressing Towards Goal  Goal: *Absence of aspiration  Outcome: Progressing Towards Goal  Goal: *Absence of deep venous thrombosis signs and symptoms(Stroke Metric)  Outcome: Progressing Towards Goal  Goal: *Optimal pain control at patient's stated goal  Outcome: Progressing Towards Goal  Goal: *Tolerating diet  Outcome: Progressing Towards Goal  Goal: *Ability to perform ADLs and demonstrates progressive mobility and function  Outcome: Progressing Towards Goal  Goal: *Stroke education continued(Stroke Metric)  Outcome: Progressing Towards Goal     Problem: Ischemic Stroke: Discharge Outcomes  Goal: *Verbalizes anxiety and depression are reduced or absent  Outcome: Progressing Towards Goal  Goal: *Verbalize understanding of risk factor modification(Stroke Metric)  Outcome: Progressing Towards Goal  Goal: *Hemodynamically stable  Outcome: Progressing Towards Goal  Goal: *Absence of aspiration pneumonia  Outcome: Progressing Towards Goal  Goal: *Aware of needed dietary changes  Outcome: Progressing Towards Goal  Goal: *Verbalize understanding of prescribed medications including anti-coagulants, anti-lipid, and/or anti-platelets(Stroke Metric)  Outcome: Progressing Towards Goal  Goal: *Tolerating diet  Outcome: Progressing Towards Goal  Goal: *Aware of follow-up diagnostics related to anticoagulants  Outcome: Progressing Towards Goal  Goal: *Ability to perform ADLs and demonstrates progressive mobility and function  Outcome: Progressing Towards Goal  Goal: *Absence of DVT(Stroke Metric)  Outcome: Progressing Towards Goal  Goal: *Absence of aspiration  Outcome: Progressing Towards Goal  Goal: *Optimal pain control at patient's stated goal  Outcome: Progressing Towards Goal  Goal: *Home safety concerns addressed  Outcome: Progressing Towards Goal  Goal: *Describes available resources and support systems  Outcome: Progressing Towards Goal  Goal: *Verbalizes understanding of activation of EMS(911) for stroke symptoms(Stroke Metric)  Outcome: Progressing Towards Goal  Goal: *Understands and describes signs and symptoms to report to providers(Stroke Metric)  Outcome: Progressing Towards Goal  Goal: *Neurolgocially stable (absence of additional neurological deficits)  Outcome: Progressing Towards Goal  Goal: *Verbalizes importance of follow-up with primary care physician(Stroke Metric)  Outcome: Progressing Towards Goal  Goal: *Smoking cessation discussed,if applicable(Stroke Metric)  Outcome: Progressing Towards Goal  Goal: *Depression screening completed(Stroke Metric)  Outcome: Progressing Towards Goal

## 2019-10-13 NOTE — PROGRESS NOTES
Pharmacist Note - Warfarin Dosing  Consult provided for this 60 y.o.male to manage warfarin for right carotid artery thrombus  - Recent history bilateral PE and LE DVT. INR Goal: 2 - 3    Home regimen/ tablet size: 5 mg every day (patient unsure/not compliant). 9/10/19 Eliquis changed back to warfarin due to lack of insurance    Drugs that may increase INR: None  Drugs that may decrease INR: None  Other current anticoagulants/ drugs that may increase bleeding risk: Aspirin  Risk factors: None  Daily INR ordered: YES    Recent Labs     10/13/19  0426 10/12/19  0505 10/11/19  0504   HGB 9.5* 8.9* 8.6*   INR 2.0* 2.3* 2.2*     Date               INR                  Dose  10/3  1.2  5 mg   10/4  1.2  5 mg  10/5  1.2  7.5 mg  10/6  1.2  7.5 mg     10/7  1.3  9 mg   10/8  1.3  10 mg  10/9  1.5  10 mg  10/10  1.9, 2.0  9 mg  10/11                 2.2                     9 mg  10/12  2.3  7.5 mg  10/13  2.0  9                                                                                   Assessment/ Plan: Will order warfarin 9 mg PO x 1 dose. NIS recommends 1-2 days of overlap with heparin drip and therapeutic INR, per attending notes. Pharmacy will continue to monitor daily and adjust therapy as indicated. Please contact the pharmacist at x [phone] or [phone] for outpatient recommendations if needed.

## 2019-10-13 NOTE — PROGRESS NOTES
Hospitalist Progress Note  Chicho Navarrete MD  Answering service: [phone] OR [phone] from in house phone        Date of Service:  10/13/2019  NAME:  Roseann Batista  :  1955  MRN:  [id number]      Admission Summary: This is a 24-year-old  male with past medical history significant for bilateral lower extremity DVT and bilateral PE, on Coumadin, history of kidney stones, history of alcohol abuse and tobacco abuse, presented to [facility] with left hand weakness that started this morning quarter to 07:00 a.m. He stated that he had left hand weakness around 04:00 p.m. yesterday, and the weakness lasted for 3 hours and went away. This morning quarter to 07:00 a.m., he said he felt like someone put pressure on his left arm. He could not make a fist and decided to come to [facility]. No headache, fever, chills, sweating, left-sided chest pain, palpitation, shortness of breath, cough, abdominal pain, urinary complaint, or lower extremity weakness or swelling. No abnormal bowel movement. He took his Coumadin at 6 a.m. this morning. Interval history / Subjective:   Patient seen and evaluated. He has no complaints. He is asking where he can go home. He has been on warfarin before and he says he took it for 8 months without INR check? Stated the doctor \"did not tell me I have to follow. \"        Assessment & Plan:     Symptomatic right carotid thrombus with subsequent small infarcts    -Pt presented with left hand weakness and CTA showed large wall adherent fibrofatty plaque/thrombus in the distal right common carotid artery extending to the bifurcation.  -Neurointervention consulted. Pt is on heparin drip. Will continue to follow recommendations. -ECHO c/w EF 56 - 60%.  No regional wall motion abnormality noted.  -Educated about stroke   -Follow up  CTA of neck and head showed improvement in the caliber of thrombus in the right common carotid, internal and external carotid arteries  -INR therapeutic x 2.heparin gtt stopped on 10/11    Anemia:   Hb stable. Monitor      Hypokalemia. -Resolved    Hypophosphatemia.   -On Neutra-Phos. am labs     Leukocytosis, 12.1 10/3 . Resolved  -The patient is afebrile, no signs or symptoms  of infection. H/o  bilateral large PE and bilateral lower extremity DVT, on 08/08/2018    -Pt took Coumadin on outpatient. Difficult to tell if he was compliant being an alcohol and not knowing when the last time he took it. -INR therapeutic. Tobacco abuse.    -The patient is advised to stop smoking. H/o alcohol abuse  -Thiamine,folic acid po  -No evidence of withdrawal.     Code status:full code  DVT prophylaxis:on heparin drip+ warfarin  Care Plan discussed with: Patient/Family  Disposition:   Barriers to discharge:Uninsured. No PCP to monitor INR therapy. History of poor compliance. He is scheduled to go [facility]17[facility],thursday. If INR still therapeutic by tomorrow ,may discharge him,he would definitely need INR check in 3-5 days of discharge. Hospital Problems  Date Reviewed: 8/8/2018          Codes Class Noted POA    Carotid artery embolus, right ICD-10-CM: I65.21  ICD-9-CM: 433.10  10/2/2019 Unknown        * (Principal) Carotid artery thrombosis, right ICD-10-CM: I65.21  ICD-9-CM: 433.10  10/1/2019 Unknown        Stroke due to embolism [facility]) ICD-10-CM: I63.9  ICD-9-CM: 434.11  10/1/2019 Unknown        Left hand weakness ICD-10-CM: R29.898  ICD-9-CM: 728.87  9/30/2019 Unknown                Review of Systems:   A comprehensive review of systems was negative except for that written in the HPI. Vital Signs:    Last 24hrs VS reviewed since prior progress note.  Most recent are:  Visit Vitals  /68 (BP 1 Location: Left arm, BP Patient Position: At rest)   Pulse 67   Temp 99.3 °F (37.4 °C)   Resp 17   Ht 5' 10\" (1.778 m)   Wt 82.2 kg (181 lb 3.2 oz)   SpO2 95%   BMI 26.00 kg/m²       No intake or output data in the 24 hours ending 10/13/19 1352     Physical Examination:         Constitutional:  No acute distress, cooperative, pleasant    ENT:  Oral mucous moist, oropharynx benign. Resp:  CTA bilaterally. No wheezing/rhonchi/rales. No accessory muscle use   CV:  Regular rhythm, normal rate, no murmurs, gallops, rubs    GI:  Soft, non distended, non tender. normoactive bowel sounds, no hepatosplenomegaly     Musculoskeletal:  No edema, warm, 2+ pulses throughout    Neurologic: Poor hand  on left since admission. AAOx3. Psych:  Good insight, Not anxious nor agitated. Data Review:    Review and/or order of clinical lab test      Labs:     Recent Labs     10/13/19  0426 10/12/19  0505   HGB 9.5* 8.9*   HCT 29.8* 28.5*     No results for input(s): NA, K, CL, CO2, BUN, CREA, GLU, CA, MG, PHOS, URICA in the last 72 hours. No results for input(s): SGOT, GPT, ALT, AP, TBIL, TBILI, TP, ALB, GLOB, GGT, AML, LPSE in the last 72 hours. No lab exists for component: AMYP, HLPSE  Recent Labs     10/13/19  0426 10/12/19  0505 10/11/19  0504   INR 2.0* 2.3* 2.2*   PTP 19.9* 22.4* 21.8*   APTT 40.0* 41.0* 66.4*      No results for input(s): FE, TIBC, PSAT, FERR in the last 72 hours. Lab Results   Component Value Date/Time    Folate 4.4 (L) 08/09/2018 03:24 AM      No results for input(s): PH, PCO2, PO2 in the last 72 hours. No results for input(s): CPK, CKNDX, TROIQ in the last 72 hours.     No lab exists for component: CPKMB  Lab Results   Component Value Date/Time    Cholesterol, total 94 10/01/2019 03:50 AM    HDL Cholesterol 37 10/01/2019 03:50 AM    LDL, calculated 44.6 10/01/2019 03:50 AM    Triglyceride 62 10/01/2019 03:50 AM    CHOL/HDL Ratio 2.5 10/01/2019 03:50 AM     Lab Results   Component Value Date/Time    Glucose (POC) 138 (H) 09/30/2019 09:55 AM     Lab Results   Component Value Date/Time    Color YELLOW/STRAW 08/27/2015 05:27 PM Appearance CLEAR 08/27/2015 05:27 PM    Specific gravity 1.017 08/27/2015 05:27 PM    pH (UA) 7.0 08/27/2015 05:27 PM    Protein NEGATIVE  08/27/2015 05:27 PM    Glucose NEGATIVE  08/27/2015 05:27 PM    Ketone 40 (A) 08/27/2015 05:27 PM    Bilirubin NEGATIVE  08/27/2015 05:27 PM    Urobilinogen 0.2 08/27/2015 05:27 PM    Nitrites NEGATIVE  08/27/2015 05:27 PM    Leukocyte Esterase NEGATIVE  08/27/2015 05:27 PM    Epithelial cells FEW 08/27/2015 05:27 PM    Bacteria NEGATIVE  08/27/2015 05:27 PM    WBC 0-4 08/27/2015 05:27 PM    RBC 0-5 08/27/2015 05:27 PM         Medications Reviewed:     Current Facility-Administered Medications   Medication Dose Route Frequency    warfarin (COUMADIN) tablet 9 mg  9 mg Oral ONCE    benzocaine-menthol (CEPACOL) lozenge 1 Lozenge  1 Lozenge Mucous Membrane Q4H PRN    pantoprazole (PROTONIX) tablet 40 mg  40 mg Oral DAILY    Warfarin - pharmacy to dose   Other Rx Dosing/Monitoring    aspirin chewable tablet 81 mg  81 mg Oral DAILY    atorvastatin (LIPITOR) tablet 80 mg  80 mg Oral DAILY    acetaminophen (TYLENOL) tablet 650 mg  650 mg Oral Q4H PRN    Or    acetaminophen (TYLENOL) solution 650 mg  650 mg Per NG tube Q4H PRN    Or    acetaminophen (TYLENOL) suppository 650 mg  650 mg Rectal Q4H PRN     ______________________________________________________________________  EXPECTED LENGTH OF STAY: 2d 2h  ACTUAL LENGTH OF STAY:          11                 Neelam Ramires MD

## 2019-10-13 NOTE — PROGRESS NOTES
Problem: Pain  Goal: *Control of Pain  Outcome: Progressing Towards Goal     Problem: Falls - Risk of  Goal: *Absence of Falls  Description  Document Kaitlynn Patches Fall Risk and appropriate interventions in the flowsheet. Outcome: Progressing Towards Goal  Note:   Fall Risk Interventions:  Mobility Interventions: Strengthening exercises (ROM-active/passive)         Medication Interventions: Teach patient to arise slowly    Elimination Interventions: Call light in reach              Problem: Pressure Injury - Risk of  Goal: *Prevention of pressure injury  Description  Document Capo Scale and appropriate interventions in the flowsheet.   Outcome: Progressing Towards Goal  Note:   Pressure Injury Interventions:  Sensory Interventions: Float heels         Activity Interventions: Increase time out of bed    Mobility Interventions: HOB 30 degrees or less    Nutrition Interventions: Offer support with meals,snacks and hydration                     Problem: Patient Education: Go to Patient Education Activity  Goal: Patient/Family Education  Outcome: Progressing Towards Goal     Problem: TIA/CVA Stroke: Day 2 Until Discharge  Goal: Off Pathway (Use only if patient is Off Pathway)  Outcome: Progressing Towards Goal  Goal: Activity/Safety  Outcome: Progressing Towards Goal  Goal: Diagnostic Test/Procedures  Outcome: Progressing Towards Goal  Goal: Nutrition/Diet  Outcome: Progressing Towards Goal  Goal: Discharge Planning  Outcome: Progressing Towards Goal  Goal: Medications  Outcome: Progressing Towards Goal  Goal: Respiratory  Outcome: Progressing Towards Goal  Goal: Treatments/Interventions/Procedures  Outcome: Progressing Towards Goal  Goal: Psychosocial  Outcome: Progressing Towards Goal  Goal: *Verbalizes anxiety and depression are reduced or absent  Outcome: Progressing Towards Goal  Goal: *Absence of aspiration  Outcome: Progressing Towards Goal  Goal: *Absence of deep venous thrombosis signs and symptoms(Stroke Metric)  Outcome: Progressing Towards Goal  Goal: *Optimal pain control at patient's stated goal  Outcome: Progressing Towards Goal  Goal: *Tolerating diet  Outcome: Progressing Towards Goal  Goal: *Ability to perform ADLs and demonstrates progressive mobility and function  Outcome: Progressing Towards Goal  Goal: *Stroke education continued(Stroke Metric)  Outcome: Progressing Towards Goal     Problem: Ischemic Stroke: Discharge Outcomes  Goal: *Verbalizes anxiety and depression are reduced or absent  Outcome: Progressing Towards Goal  Goal: *Verbalize understanding of risk factor modification(Stroke Metric)  Outcome: Progressing Towards Goal  Goal: *Hemodynamically stable  Outcome: Progressing Towards Goal  Goal: *Absence of aspiration pneumonia  Outcome: Progressing Towards Goal  Goal: *Aware of needed dietary changes  Outcome: Progressing Towards Goal  Goal: *Verbalize understanding of prescribed medications including anti-coagulants, anti-lipid, and/or anti-platelets(Stroke Metric)  Outcome: Progressing Towards Goal  Goal: *Tolerating diet  Outcome: Progressing Towards Goal  Goal: *Aware of follow-up diagnostics related to anticoagulants  Outcome: Progressing Towards Goal  Goal: *Ability to perform ADLs and demonstrates progressive mobility and function  Outcome: Progressing Towards Goal  Goal: *Absence of DVT(Stroke Metric)  Outcome: Progressing Towards Goal  Goal: *Absence of aspiration  Outcome: Progressing Towards Goal  Goal: *Optimal pain control at patient's stated goal  Outcome: Progressing Towards Goal  Goal: *Home safety concerns addressed  Outcome: Progressing Towards Goal  Goal: *Describes available resources and support systems  Outcome: Progressing Towards Goal  Goal: *Verbalizes understanding of activation of EMS(911) for stroke symptoms(Stroke Metric)  Outcome: Progressing Towards Goal  Goal: *Understands and describes signs and symptoms to report to providers(Stroke Metric)  Outcome: Progressing Towards Goal  Goal: *Neurolgocially stable (absence of additional neurological deficits)  Outcome: Progressing Towards Goal  Goal: *Verbalizes importance of follow-up with primary care physician(Stroke Metric)  Outcome: Progressing Towards Goal  Goal: *Smoking cessation discussed,if applicable(Stroke Metric)  Outcome: Progressing Towards Goal  Goal: *Depression screening completed(Stroke Metric)  Outcome: Progressing Towards Goal     Problem: Patient Education: Go to Patient Education Activity  Goal: Patient/Family Education  Outcome: Progressing Towards Goal     Problem: Patient Education: Go to Patient Education Activity  Goal: Patient/Family Education  Outcome: Progressing Towards Goal

## 2019-10-14 LAB
APTT PPP: 36.1 SEC (ref 22.1–32)
HCT VFR BLD AUTO: 28.4 % (ref 36.6–50.3)
HGB BLD-MCNC: 9 G/DL (ref 12.1–17)
INR PPP: 1.9 (ref 0.9–1.1)
PROTHROMBIN TIME: 18.9 SEC (ref 9–11.1)
THERAPEUTIC RANGE,PTTT: ABNORMAL SECS (ref 58–77)

## 2019-10-14 PROCEDURE — 74011250637 HC RX REV CODE- 250/637: Performed by: INTERNAL MEDICINE

## 2019-10-14 PROCEDURE — 85730 THROMBOPLASTIN TIME PARTIAL: CPT

## 2019-10-14 PROCEDURE — 85610 PROTHROMBIN TIME: CPT

## 2019-10-14 PROCEDURE — 74011250637 HC RX REV CODE- 250/637: Performed by: NURSE PRACTITIONER

## 2019-10-14 PROCEDURE — 74011250637 HC RX REV CODE- 250/637: Performed by: HOSPITALIST

## 2019-10-14 PROCEDURE — 36415 COLL VENOUS BLD VENIPUNCTURE: CPT

## 2019-10-14 PROCEDURE — 65270000032 HC RM SEMIPRIVATE

## 2019-10-14 PROCEDURE — 74011250637 HC RX REV CODE- 250/637: Performed by: PSYCHIATRY & NEUROLOGY

## 2019-10-14 PROCEDURE — 85018 HEMOGLOBIN: CPT

## 2019-10-14 RX ORDER — WARFARIN SODIUM 5 MG/1
10 TABLET ORAL ONCE
Status: COMPLETED | OUTPATIENT
Start: 2019-10-14 | End: 2019-10-14

## 2019-10-14 RX ORDER — IBUPROFEN 200 MG
1 TABLET ORAL DAILY
Status: DISCONTINUED | OUTPATIENT
Start: 2019-10-15 | End: 2019-10-14

## 2019-10-14 RX ORDER — IBUPROFEN 200 MG
1 TABLET ORAL DAILY
Status: DISCONTINUED | OUTPATIENT
Start: 2019-10-14 | End: 2019-10-17 | Stop reason: HOSPADM

## 2019-10-14 RX ADMIN — PANTOPRAZOLE SODIUM 40 MG: 40 TABLET, DELAYED RELEASE ORAL at 09:34

## 2019-10-14 RX ADMIN — WARFARIN SODIUM 10 MG: 5 TABLET ORAL at 18:41

## 2019-10-14 RX ADMIN — ATORVASTATIN CALCIUM 80 MG: 40 TABLET, FILM COATED ORAL at 09:34

## 2019-10-14 RX ADMIN — ASPIRIN 81 MG CHEWABLE TABLET 81 MG: 81 TABLET CHEWABLE at 09:34

## 2019-10-14 NOTE — PROGRESS NOTES
Problem: Pain  Goal: *Control of Pain  Outcome: Progressing Towards Goal     Problem: Falls - Risk of  Goal: *Absence of Falls  Description  Document Regcade Mello Fall Risk and appropriate interventions in the flowsheet. Outcome: Progressing Towards Goal  Note:   Fall Risk Interventions:  Mobility Interventions: Strengthening exercises (ROM-active/passive)         Medication Interventions: Teach patient to arise slowly    Elimination Interventions: Call light in reach              Problem: Pressure Injury - Risk of  Goal: *Prevention of pressure injury  Description  Document Capo Scale and appropriate interventions in the flowsheet.   Outcome: Progressing Towards Goal  Note:   Pressure Injury Interventions:  Sensory Interventions: Float heels         Activity Interventions: Increase time out of bed    Mobility Interventions: HOB 30 degrees or less    Nutrition Interventions: Offer support with meals,snacks and hydration                     Problem: TIA/CVA Stroke: Day 2 Until Discharge  Goal: Activity/Safety  Outcome: Progressing Towards Goal  Goal: Diagnostic Test/Procedures  Outcome: Progressing Towards Goal  Goal: Nutrition/Diet  Outcome: Progressing Towards Goal  Goal: Discharge Planning  Outcome: Progressing Towards Goal  Goal: Medications  Outcome: Progressing Towards Goal  Goal: Respiratory  Outcome: Progressing Towards Goal  Goal: Treatments/Interventions/Procedures  Outcome: Progressing Towards Goal  Goal: Psychosocial  Outcome: Progressing Towards Goal  Goal: *Verbalizes anxiety and depression are reduced or absent  Outcome: Progressing Towards Goal  Goal: *Absence of aspiration  Outcome: Progressing Towards Goal  Goal: *Absence of deep venous thrombosis signs and symptoms(Stroke Metric)  Outcome: Progressing Towards Goal  Goal: *Optimal pain control at patient's stated goal  Outcome: Progressing Towards Goal  Goal: *Tolerating diet  Outcome: Progressing Towards Goal  Goal: *Ability to perform ADLs and demonstrates progressive mobility and function  Outcome: Progressing Towards Goal  Goal: *Stroke education continued(Stroke Metric)  Outcome: Progressing Towards Goal     Problem: Ischemic Stroke: Discharge Outcomes  Goal: *Verbalizes anxiety and depression are reduced or absent  Outcome: Progressing Towards Goal  Goal: *Verbalize understanding of risk factor modification(Stroke Metric)  Outcome: Progressing Towards Goal  Goal: *Hemodynamically stable  Outcome: Progressing Towards Goal  Goal: *Absence of aspiration pneumonia  Outcome: Progressing Towards Goal  Goal: *Aware of needed dietary changes  Outcome: Progressing Towards Goal  Goal: *Verbalize understanding of prescribed medications including anti-coagulants, anti-lipid, and/or anti-platelets(Stroke Metric)  Outcome: Progressing Towards Goal  Goal: *Tolerating diet  Outcome: Progressing Towards Goal  Goal: *Aware of follow-up diagnostics related to anticoagulants  Outcome: Progressing Towards Goal  Goal: *Ability to perform ADLs and demonstrates progressive mobility and function  Outcome: Progressing Towards Goal  Goal: *Absence of DVT(Stroke Metric)  Outcome: Progressing Towards Goal  Goal: *Absence of aspiration  Outcome: Progressing Towards Goal  Goal: *Optimal pain control at patient's stated goal  Outcome: Progressing Towards Goal  Goal: *Home safety concerns addressed  Outcome: Progressing Towards Goal  Goal: *Describes available resources and support systems  Outcome: Progressing Towards Goal  Goal: *Verbalizes understanding of activation of EMS(911) for stroke symptoms(Stroke Metric)  Outcome: Progressing Towards Goal  Goal: *Understands and describes signs and symptoms to report to providers(Stroke Metric)  Outcome: Progressing Towards Goal  Goal: *Neurolgocially stable (absence of additional neurological deficits)  Outcome: Progressing Towards Goal  Goal: *Verbalizes importance of follow-up with primary care physician(Stroke Metric)  Outcome: Progressing Towards Goal  Goal: *Smoking cessation discussed,if applicable(Stroke Metric)  Outcome: Progressing Towards Goal  Goal: *Depression screening completed(Stroke Metric)  Outcome: Progressing Towards Goal

## 2019-10-14 NOTE — PROGRESS NOTES
Called patient's [facility]'s pharmacy to inquire about his prescription fill. He had filled Apixaban one time in August of 2018 and Warfarin one time in September of 2018,none since at least in the [facility].

## 2019-10-14 NOTE — PROGRESS NOTES
Pharmacist Note - Warfarin Dosing  Consult provided for this 60 y.o.male to manage warfarin for right carotid artery thrombus  - Recent history bilateral PE and LE DVT. INR Goal: 2 - 3    Date               INR                  Dose  10/3  1.2  5 mg   10/4  1.2  5 mg  10/5  1.2  7.5 mg  10/6  1.2  7.5 mg     10/7  1.3  9 mg   10/8  1.3  10 mg  10/9  1.5  10 mg  10/10  1.9, 2.0  9 mg  10/11                 2.2                     9 mg  10/12  2.3  7.5 mg  10/13  2.0  9 mg  10/14                 1.9                     10 mg                                                                               Assessment/ Plan: Will order warfarin 10 mg PO x 1 dose. Pharmacy will continue to monitor daily and adjust therapy as indicated. Please contact the pharmacist at [phone] or [phone] for outpatient recommendations if needed.

## 2019-10-14 NOTE — PROGRESS NOTES
NUTRITION   RD Assessment       Pt seen for:    [] Rescreen   []   Education    [] Diet order clarification []   Other   [x] LOS                          Nutrition Prescription:     No changes or new recommendations at this time  Encourage adequate intake of meals and supplements    Assessment:   Information obtained from:   patient         PMHx: Alcohol use, Idiopathic gout of multiple sites (6/7/2016), Kidney stone (10/16/2015), and Tobacco abuse. Pt previously educated on diet parameters while on coumadin therapy. Discussion & booklet given. Pt has been eating well and awaiting for INR to be at therapeutic levels before discharge home. Pt is expected to [facility]. No further nutrition related concerns identified at this time. Wt has been stable. Cultural, Druze and ethnic food preferences:   [x]  None   []  Identified and addressed    Diet:  Cardiac    PO Intake: [x] Good     [] Fair      [] Poor   Intake documented from Nursing flow sheets:   No data found. - eating well, no complaints    Wt Readings from Last 5 Encounters:   10/14/19 82.3 kg (181 lb 6.4 oz)   09/30/19 82.4 kg (181 lb 9.6 oz)   08/13/18 92.5 kg (204 lb)   08/08/18 99.8 kg (220 lb)   10/16/15 93 kg (205 lb)   ]  Body mass index is 26.03 kg/m². Skin: intact  BM: 10/12/19       ABD: WDL      Bowel Sounds last documented:       Estimated Daily Nutrition Requirements:  Kcals/day: 2118 Kcals/day  Protein: 99 g(1.2g/kg of current wt)  Fluid: (1 mL/kcal of PO)     Based On:  [place](AF 1.3)  Weight Used: Actual wt(82.3 kg)    Weight Changes:   []   Loss  []   Gain  [x]   Stable    Last 3 Recorded Weights in this Encounter    10/12/19 0212 10/13/19 0157 10/14/19 0205   Weight: 83.5 kg (184 lb 1.4 oz) 82.2 kg (181 lb 3.2 oz) 82.3 kg (181 lb 6.4 oz)     Nutrition Problems Identified  [x]     None  []     Specified food preferences     Nutrition Diagnosis:      No nutrition diagnosis identified at this time    Intervention:   []    Obtained/adjusted food preferences/tolerances and/or snacks options   []    Dislikes supplements will try a substitution   []    Modify diet for food allergies  []    Adjust texture due to difficulty chewing   []    Encourage Fresh Fruit, Activia yogurt, fluid   [x]    Educated patient - consistent Vit K diet   [x]    Rescreen per screening protocol  []    Add Supplements    Goal: n/a    Monitoring/Evaluation:   Education & Discharge Needs  [] Nutrition related discharge needs addressed:   [] Supplements (on d/c instruction &/or coupons provided)    [] Education   [x] No nutrition related discharge needs at this time    Rescreen: []  At Nutrition Risk          [x]  Not at Nutrition Risk, rescreen per screening protocol    Perla Tang RD, MS, CDE  Pager # [phone]

## 2019-10-14 NOTE — PROGRESS NOTES
TRANSFER - IN REPORT:    Verbal report received from Rebel Otero RN (name) on Khoi Prescott  being received from 6S (unit) for routine progression of care      Report consisted of patients Situation, Background, Assessment and   Recommendations(SBAR). Information from the following report(s) SBAR, Kardex, Intake/Output, MAR and Recent Results was reviewed with the receiving nurse. Opportunity for questions and clarification was provided. Assessment completed upon patients arrival to unit and care assumed.

## 2019-10-14 NOTE — PROGRESS NOTES
Hospitalist Progress Note  Niyah Gonzalez MD  Answering service: [phone] OR [phone] from in house phone        Date of Service:  10/14/2019  NAME:  Francesca Sheffield  :  1955  MRN:  [id number]      Admission Summary: This is a 71-year-old  male with past medical history significant for bilateral lower extremity DVT and bilateral PE, on Coumadin, history of kidney stones, history of alcohol abuse and tobacco abuse, presented to [facility] with left hand weakness that started this morning quarter to 07:00 a.m. He stated that he had left hand weakness around 04:00 p.m. yesterday, and the weakness lasted for 3 hours and went away. This morning quarter to 07:00 a.m., he said he felt like someone put pressure on his left arm. He could not make a fist and decided to come to [facility] Emergency [facility]. No headache, fever, chills, sweating, left-sided chest pain, palpitation, shortness of breath, cough, abdominal pain, urinary complaint, or lower extremity weakness or swelling. No abnormal bowel movement. He took his Coumadin at 6 a.m. this morning. Interval history / Subjective:   Patient lying in bed. Offered no complaints. He is aware INR is subtherapeutic. Assessment & Plan:     Symptomatic right carotid thrombus with subsequent small infarcts    -Pt presented with left hand weakness and CTA showed large wall adherent fibrofatty plaque/thrombus in the distal right common carotid artery extending to the bifurcation.  -Neurointervention consulted. Pt is on heparin drip. Will continue to follow recommendations. -ECHO c/w EF 56 - 60%. No regional wall motion abnormality noted.  -Educated about stroke   -Follow up  CTA of neck and head showed improvement in the caliber of thrombus in the right common carotid, internal and external carotid arteries  -Heparin gtt stopped on 10/11  -INR 1.9 today. Discussed with Dr Adrianne Soriano NOACs coud have been just fine over VKA,but patient is uninsured and could not afford NOACs.    -Pharmacy dosing and monitoring. Anemia:   Hb stable. Monitor      Hypokalemia. -Resolved    Hypophosphatemia.   -On Neutra-Phos. am labs     Leukocytosis, 12.1 10/3 . Resolved  -The patient is afebrile, no signs or symptoms  of infection. H/o  bilateral large PE and bilateral lower extremity DVT, on 08/08/2018    -Pt took Coumadin on outpatient. Difficult to tell if he was compliant being an alcohol and not knowing when the last time he took it. -INR therapeutic. Tobacco abuse.    -The patient is advised to stop smoking.  -Nicotine patch    H/o alcohol abuse  -Thiamine,folic acid po  -No evidence of withdrawal.     Code status:full code  DVT prophylaxis:on heparin drip+ warfarin  Care Plan discussed with: Patient/Family  Disposition:   Barriers to discharge:Uninsured. No PCP to monitor INR therapy. History of poor compliance. [facility]. If INR still therapeutic by tomorrow ,may discharge him,he would definitely need INR check in 3-5 days of discharge. He is uninsured and could not afford NOACs. Hospital Problems  Date Reviewed: 8/8/2018          Codes Class Noted POA    Carotid artery embolus, right ICD-10-CM: I65.21  ICD-9-CM: 433.10  10/2/2019 Unknown        * (Principal) Carotid artery thrombosis, right ICD-10-CM: I65.21  ICD-9-CM: 433.10  10/1/2019 Unknown        Stroke due to embolism [facility]) ICD-10-CM: I63.9  ICD-9-CM: 434.11  10/1/2019 Unknown        Left hand weakness ICD-10-CM: R29.898  ICD-9-CM: 728.87  9/30/2019 Unknown                Review of Systems:   A comprehensive review of systems was negative except for that written in the HPI. Vital Signs:    Last 24hrs VS reviewed since prior progress note.  Most recent are:  Visit Vitals  BP 92/49   Pulse 60   Temp 98.5 °F (36.9 °C)   Resp 20   Ht 5' 10\" (1.778 m)   Wt 82.3 kg (181 lb 6.4 oz)   SpO2 97%   BMI 26.03 kg/m²       No intake or output data in the 24 hours ending 10/14/19 1727     Physical Examination:         Constitutional:  No acute distress, cooperative, pleasant    ENT:  Oral mucous moist, oropharynx benign. Resp:  CTA bilaterally. No wheezing/rhonchi/rales. No accessory muscle use   CV:  Regular rhythm, normal rate, no murmurs, gallops, rubs    GI:  Soft, non distended, non tender. normoactive bowel sounds, no hepatosplenomegaly     Musculoskeletal:  No edema, warm, 2+ pulses throughout    Neurologic: Poor hand  on left since admission. AAOx3. Psych:  Good insight, Not anxious nor agitated. Data Review:    Review and/or order of clinical lab test      Labs:     Recent Labs     10/14/19  0455 10/13/19  0426   HGB 9.0* 9.5*   HCT 28.4* 29.8*     No results for input(s): NA, K, CL, CO2, BUN, CREA, GLU, CA, MG, PHOS, URICA in the last 72 hours. No results for input(s): SGOT, GPT, ALT, AP, TBIL, TBILI, TP, ALB, GLOB, GGT, AML, LPSE in the last 72 hours. No lab exists for component: AMYP, HLPSE  Recent Labs     10/14/19  0455 10/13/19  0426 10/12/19  0505   INR 1.9* 2.0* 2.3*   PTP 18.9* 19.9* 22.4*   APTT 36.1* 40.0* 41.0*      No results for input(s): FE, TIBC, PSAT, FERR in the last 72 hours. Lab Results   Component Value Date/Time    Folate 4.4 (L) 08/09/2018 03:24 AM      No results for input(s): PH, PCO2, PO2 in the last 72 hours. No results for input(s): CPK, CKNDX, TROIQ in the last 72 hours.     No lab exists for component: CPKMB  Lab Results   Component Value Date/Time    Cholesterol, total 94 10/01/2019 03:50 AM    HDL Cholesterol 37 10/01/2019 03:50 AM    LDL, calculated 44.6 10/01/2019 03:50 AM    Triglyceride 62 10/01/2019 03:50 AM    CHOL/HDL Ratio 2.5 10/01/2019 03:50 AM     Lab Results   Component Value Date/Time    Glucose (POC) 138 (H) 09/30/2019 09:55 AM     Lab Results   Component Value Date/Time    Color YELLOW/STRAW 08/27/2015 05:27 PM    Appearance CLEAR 08/27/2015 05:27 PM    Specific gravity 1.017 08/27/2015 05:27 PM    pH (UA) 7.0 08/27/2015 05:27 PM    Protein NEGATIVE  08/27/2015 05:27 PM    Glucose NEGATIVE  08/27/2015 05:27 PM    Ketone 40 (A) 08/27/2015 05:27 PM    Bilirubin NEGATIVE  08/27/2015 05:27 PM    Urobilinogen 0.2 08/27/2015 05:27 PM    Nitrites NEGATIVE  08/27/2015 05:27 PM    Leukocyte Esterase NEGATIVE  08/27/2015 05:27 PM    Epithelial cells FEW 08/27/2015 05:27 PM    Bacteria NEGATIVE  08/27/2015 05:27 PM    WBC 0-4 08/27/2015 05:27 PM    RBC 0-5 08/27/2015 05:27 PM         Medications Reviewed:     Current Facility-Administered Medications   Medication Dose Route Frequency    warfarin (COUMADIN) tablet 10 mg  10 mg Oral ONCE    nicotine (NICODERM CQ) 21 mg/24 hr patch 1 Patch  1 Patch TransDERmal DAILY    benzocaine-menthol (CEPACOL) lozenge 1 Lozenge  1 Lozenge Mucous Membrane Q4H PRN    pantoprazole (PROTONIX) tablet 40 mg  40 mg Oral DAILY    Warfarin - pharmacy to dose   Other Rx Dosing/Monitoring    aspirin chewable tablet 81 mg  81 mg Oral DAILY    atorvastatin (LIPITOR) tablet 80 mg  80 mg Oral DAILY    acetaminophen (TYLENOL) tablet 650 mg  650 mg Oral Q4H PRN    Or    acetaminophen (TYLENOL) solution 650 mg  650 mg Per NG tube Q4H PRN    Or    acetaminophen (TYLENOL) suppository 650 mg  650 mg Rectal Q4H PRN     ______________________________________________________________________  EXPECTED LENGTH OF STAY: 2d 2h  ACTUAL LENGTH OF STAY:          12                 Erica Burrell MD

## 2019-10-14 NOTE — PROGRESS NOTES
RAFFI     Expected to discharge home tomorrow when INR are therapeutic. Home with new patient follow up with [facility], appointment rescheduled for 10/17/2019. No further needs identified.      CM to follow.  CAROL Smyth,CRM

## 2019-10-15 LAB
APTT PPP: 35 SEC (ref 22.1–32)
HCT VFR BLD AUTO: 29.1 % (ref 36.6–50.3)
HGB BLD-MCNC: 9.3 G/DL (ref 12.1–17)
INR PPP: 1.9 (ref 0.9–1.1)
PROTHROMBIN TIME: 18.7 SEC (ref 9–11.1)
THERAPEUTIC RANGE,PTTT: ABNORMAL SECS (ref 58–77)

## 2019-10-15 PROCEDURE — 97110 THERAPEUTIC EXERCISES: CPT

## 2019-10-15 PROCEDURE — 36415 COLL VENOUS BLD VENIPUNCTURE: CPT

## 2019-10-15 PROCEDURE — 74011250637 HC RX REV CODE- 250/637: Performed by: INTERNAL MEDICINE

## 2019-10-15 PROCEDURE — 85730 THROMBOPLASTIN TIME PARTIAL: CPT

## 2019-10-15 PROCEDURE — 74011250637 HC RX REV CODE- 250/637: Performed by: HOSPITALIST

## 2019-10-15 PROCEDURE — 74011250637 HC RX REV CODE- 250/637: Performed by: PSYCHIATRY & NEUROLOGY

## 2019-10-15 PROCEDURE — 65270000032 HC RM SEMIPRIVATE

## 2019-10-15 PROCEDURE — 85018 HEMOGLOBIN: CPT

## 2019-10-15 PROCEDURE — 85610 PROTHROMBIN TIME: CPT

## 2019-10-15 PROCEDURE — 74011250637 HC RX REV CODE- 250/637: Performed by: NURSE PRACTITIONER

## 2019-10-15 RX ADMIN — PANTOPRAZOLE SODIUM 40 MG: 40 TABLET, DELAYED RELEASE ORAL at 09:22

## 2019-10-15 RX ADMIN — WARFARIN SODIUM 12.5 MG: 2.5 TABLET ORAL at 17:15

## 2019-10-15 RX ADMIN — ASPIRIN 81 MG CHEWABLE TABLET 81 MG: 81 TABLET CHEWABLE at 09:22

## 2019-10-15 RX ADMIN — ATORVASTATIN CALCIUM 80 MG: 40 TABLET, FILM COATED ORAL at 09:22

## 2019-10-15 NOTE — PROGRESS NOTES
Hospitalist Progress Note  Nessa Bennett MD  Answering service: [phone] from in house phone        Date of Service:  10/15/2019  NAME:  Mk Lutz  :  1955  MRN:  [id number]      Admission Summary: This is a 62-year-old  male with past medical history significant for bilateral lower extremity DVT and bilateral PE, on Coumadin, history of kidney stones, history of alcohol abuse and tobacco abuse, presented to [facility] with left hand weakness that started this morning quarter to 07:00 a.m. He stated that he had left hand weakness around 04:00 p.m. yesterday, and the weakness lasted for 3 hours and went away. This morning quarter to 07:00 a.m., he said he felt like someone put pressure on his left arm. He could not make a fist and decided to come to [facility]. No headache, fever, chills, sweating, left-sided chest pain, palpitation, shortness of breath, cough, abdominal pain, urinary complaint, or lower extremity weakness or swelling. No abnormal bowel movement. He took his Coumadin at 6 a.m. this morning. Interval history / Subjective:   Patient INR still sub therapeutic. No active complaints       Assessment & Plan:     Symptomatic right carotid thrombus with subsequent small infarcts    -Pt presented with left hand weakness and CTA showed large wall adherent fibrofatty plaque/thrombus in the distal right common carotid artery extending to the bifurcation.  -Neurointervention consulted. Pt is on heparin drip. Will continue to follow recommendations. -ECHO c/w EF 56 - 60%. No regional wall motion abnormality noted.  -Educated about stroke   -Follow up  CTA of neck and head showed improvement in the caliber of thrombus in the right common carotid, internal and external carotid arteries  -Heparin gtt stopped on 10/11  -. Discussed with Dr Dean Morel luis carlos have been just fine over VKA,but patient is uninsured and could not afford NOACs.  -Pharmacy dosing and monitoring. INR still 1.9, likely discharge tomorrow if therapeutic INR. Anemia:   Hb stable. Monitor      Hypokalemia. -Resolved    Hypophosphatemia.   -On Neutra-Phos. am labs     Leukocytosis, 12.1 10/3 . Resolved  -The patient is afebrile, no signs or symptoms  of infection. H/o  bilateral large PE and bilateral lower extremity DVT, on 08/08/2018    -Pt took Coumadin on outpatient. Difficult to tell if he was compliant being an alcohol and not knowing when the last time he took it. -INR sub therapeutic. Tobacco abuse.    -The patient is advised to stop smoking.  -Nicotine patch    H/o alcohol abuse  -Thiamine,folic acid po  -No evidence of withdrawal.     Code status:full code  DVT prophylaxis:on  warfarin  Care Plan discussed with: Patient/Family  Disposition:   Barriers to discharge:Uninsured. No PCP to monitor INR therapy. History of poor compliance. He is scheduled to [facility],thursday. If INR still therapeutic by tomorrow ,likely discharge him,he would definitely need INR check in 3-5 days of discharge. He is uninsured and could not afford NOACs. Hospital Problems  Date Reviewed: 8/8/2018          Codes Class Noted POA    Carotid artery embolus, right ICD-10-CM: I65.21  ICD-9-CM: 433.10  10/2/2019 Unknown        * (Principal) Carotid artery thrombosis, right ICD-10-CM: I65.21  ICD-9-CM: 433.10  10/1/2019 Unknown        Stroke due to embolism [facility]) ICD-10-CM: I63.9  ICD-9-CM: 434.11  10/1/2019 Unknown        Left hand weakness ICD-10-CM: R29.898  ICD-9-CM: 728.87  9/30/2019 Unknown                Review of Systems:   A comprehensive review of systems was negative except for that written in the HPI. Vital Signs:    Last 24hrs VS reviewed since prior progress note.  Most recent are:  Visit Vitals  /64 (BP 1 Location: Right arm, BP Patient Position: At rest)   Pulse 68   Temp 97.9 °F (36.6 °C)   Resp 16   Ht 5' 10\" (1.778 m)   Wt 82.2 kg (181 lb 3.5 oz)   SpO2 100%   BMI 26.00 kg/m²       No intake or output data in the 24 hours ending 10/15/19 0804     Physical Examination:         Constitutional:  No acute distress, cooperative, pleasant    ENT:  Oral mucous moist, oropharynx benign. Resp:  CTA bilaterally. No wheezing/rhonchi/rales. No accessory muscle use   CV:  Regular rhythm, normal rate, no murmurs, gallops, rubs    GI:  Soft, non distended, non tender. normoactive bowel sounds, no hepatosplenomegaly     Musculoskeletal:  No edema, warm, 2+ pulses throughout    Neurologic: Poor hand  on left since admission. AAOx3. Psych:  Good insight, Not anxious nor agitated. Data Review:    Review and/or order of clinical lab test      Labs:     Recent Labs     10/15/19  0543 10/14/19  0455   HGB 9.3* 9.0*   HCT 29.1* 28.4*     No results for input(s): NA, K, CL, CO2, BUN, CREA, GLU, CA, MG, PHOS, URICA in the last 72 hours. No results for input(s): SGOT, GPT, ALT, AP, TBIL, TBILI, TP, ALB, GLOB, GGT, AML, LPSE in the last 72 hours. No lab exists for component: AMYP, HLPSE  Recent Labs     10/15/19  0543 10/14/19  0455 10/13/19  0426   INR 1.9* 1.9* 2.0*   PTP 18.7* 18.9* 19.9*   APTT 35.0* 36.1* 40.0*      No results for input(s): FE, TIBC, PSAT, FERR in the last 72 hours. Lab Results   Component Value Date/Time    Folate 4.4 (L) 08/09/2018 03:24 AM      No results for input(s): PH, PCO2, PO2 in the last 72 hours. No results for input(s): CPK, CKNDX, TROIQ in the last 72 hours.     No lab exists for component: CPKMB  Lab Results   Component Value Date/Time    Cholesterol, total 94 10/01/2019 03:50 AM    HDL Cholesterol 37 10/01/2019 03:50 AM    LDL, calculated 44.6 10/01/2019 03:50 AM    Triglyceride 62 10/01/2019 03:50 AM    CHOL/HDL Ratio 2.5 10/01/2019 03:50 AM     Lab Results   Component Value Date/Time    Glucose (POC) 138 (H) 09/30/2019 09:55 AM     Lab Results   Component Value Date/Time    Color YELLOW/STRAW 08/27/2015 05:27 PM    Appearance CLEAR 08/27/2015 05:27 PM    Specific gravity 1.017 08/27/2015 05:27 PM    pH (UA) 7.0 08/27/2015 05:27 PM    Protein NEGATIVE  08/27/2015 05:27 PM    Glucose NEGATIVE  08/27/2015 05:27 PM    Ketone 40 (A) 08/27/2015 05:27 PM    Bilirubin NEGATIVE  08/27/2015 05:27 PM    Urobilinogen 0.2 08/27/2015 05:27 PM    Nitrites NEGATIVE  08/27/2015 05:27 PM    Leukocyte Esterase NEGATIVE  08/27/2015 05:27 PM    Epithelial cells FEW 08/27/2015 05:27 PM    Bacteria NEGATIVE  08/27/2015 05:27 PM    WBC 0-4 08/27/2015 05:27 PM    RBC 0-5 08/27/2015 05:27 PM         Medications Reviewed:     Current Facility-Administered Medications   Medication Dose Route Frequency    nicotine (NICODERM CQ) 21 mg/24 hr patch 1 Patch  1 Patch TransDERmal DAILY    benzocaine-menthol (CEPACOL) lozenge 1 Lozenge  1 Lozenge Mucous Membrane Q4H PRN    pantoprazole (PROTONIX) tablet 40 mg  40 mg Oral DAILY    Warfarin - pharmacy to dose   Other Rx Dosing/Monitoring    aspirin chewable tablet 81 mg  81 mg Oral DAILY    atorvastatin (LIPITOR) tablet 80 mg  80 mg Oral DAILY    acetaminophen (TYLENOL) tablet 650 mg  650 mg Oral Q4H PRN    Or    acetaminophen (TYLENOL) solution 650 mg  650 mg Per NG tube Q4H PRN    Or    acetaminophen (TYLENOL) suppository 650 mg  650 mg Rectal Q4H PRN     ______________________________________________________________________  EXPECTED LENGTH OF STAY: 2d 2h  ACTUAL LENGTH OF STAY:          John Kendrick MD

## 2019-10-15 NOTE — ROUTINE PROCESS
Bedside shift change report given to j luis sullivan rn (oncoming nurse) by Nicolette Thornton (offgoing nurse). Report included the following information SBAR and Kardex.

## 2019-10-15 NOTE — PROGRESS NOTES
Provided pastoral care visit to [facility] patient. Did not include sacramental care.     Scott Slade

## 2019-10-15 NOTE — PROGRESS NOTES
Bedside and Verbal shift change report given to [address] [place] [address],  [address], RN (oncoming nurse) by Zain Berry RN (offgoing nurse). Report included the following information SBAR, Kardex, Intake/Output, MAR and Recent Results.

## 2019-10-15 NOTE — PROGRESS NOTES
Problem: Self Care Deficits Care Plan (Adult)  Goal: *Acute Goals and Plan of Care (Insert Text)  Description  FUNCTIONAL STATUS PRIOR TO ADMISSION: Patient was independent and active without use of DME.    HOME SUPPORT PRIOR TO ADMISSION: The patient lived with spouse but did not require assist. Pt indicated that spouse will not be at home upon DC. Anticipate pt will live at home, however will have friends for limited support. Occupational Therapy Goals  Re-evaluation 10/10/2019  1. Patient will participate in upper extremity therapeutic exercise/activities with supervision/set-up for 10 minutes within 7 day(s). 2.  Patient will improve their Fugl Wu score by 5 points in prep for ADLs within 7 days. Initiated 10/3/2019     1. Pt will complete LB bathing with S within 7 days. 2. Pt will complete LB dressing with S within 7 days. 3. Pt will complete UB dressing with S within 7 days. 4. Pt will complete grooming/hygeine at sink with set-up/S within 7 days. 5. Pt will complete toilet transfer with S within 7 days. 6. Pt will complete toileting hygiene with S within 7 days. Outcome: Progressing Towards Goal   OCCUPATIONAL THERAPY TREATMENT  Patient: Gianni Berry (20 y.o. male)  Date: 10/15/2019  Diagnosis: Left hand weakness [R29.898]  Carotid artery embolus, right [I65.21] Carotid artery thrombosis, right       Precautions: Fall  Chart, occupational therapy assessment, plan of care, and goals were reviewed. ASSESSMENT  Patient continues with skilled OT services and is progressing towards goals. Patient impacted by impaired strength left (nondominant) UE with impaired AROM of left wrist and finger in  extension. Patient currently with resistive sponges in 2 resistances for  strength.  Patient instructed in AROM left finger extension and new resistive exercises with theraputty including rolling with left wrist and fingers in extension, 3 point pinch, full , and resistive finger extension for index finger only. Patient demonstrates understanding of instruction and verbalizes understanding of keeping putty in container issued in to prevent damaging clothing and furniture. Patient instructed in functional use of left hand in bimanual tasks to increase coordination and strength. Issued cylindrical foam to use on fork as a stabilizer while cutting food with right hand. Issued a handout with pictures and written instructions on theraputty exercises. Instructed in positioning left forearm with wrist in neutral or extended to prevent soft tissue shortening at rest.     Current Level of Function Impacting Discharge (ADLs): Impaired left, nondominant, UE strength and coordination    Other factors to consider for discharge: none         PLAN :  Patient continues to benefit from skilled intervention to address the above impairments. Continue treatment per established plan of care. to address goals. Recommend with staff: [place] in chair for all meals and throughout day, self care with distant supervision (may need occasional assist for small packages or fasteners), toileting in bathroom with Mod I    Recommend next OT session: follow through with issued exercises and education    Recommendation for discharge: (in order for the patient to meet his/her long term goals)  To be determined: Outpatient neuro OT, but noted patient without ins; possibly through [facility] if offered     This discharge recommendation:  Has been made in collaboration with the attending provider and/or case management    Equipment recommendations for successful discharge (if) home: none       SUBJECTIVE:   Patient stated I can't put my socks on.  patient demonstrated that he could put socks on    OBJECTIVE DATA SUMMARY:   Cognitive/Behavioral Status:  Neurologic State: Alert  Orientation Level: Oriented X4  Cognition: Appropriate for age attention/concentration; Follows commands  Perception: Appears intact  Perseveration: No perseveration noted  Safety/Judgement: Awareness of environment    Functional Mobility and Transfers for ADLs:    ADL Intervention:                 Lower Body Bathing  Lower Body : Modified independent(in simulation)  Position Performed: Seated edge of bed         Lower Body Dressing Assistance  Socks: Modified independent(seated EOB)         Cognitive Retraining  Safety/Judgement: Awareness of environment    Therapeutic Exercises:   See above. Completed 1 set of 5 reps of each. Instructed to complete 3 sets of 10 reps a day of each      Activity Tolerance:   Good  Please refer to the flowsheet for vital signs taken during this treatment.     After treatment patient left in no apparent distress:   Call bell within reach; seated EOB    COMMUNICATION/COLLABORATION:   The patients plan of care was discussed with: Registered Nurse    SHIV Woods Caro  Time Calculation: 49 mins

## 2019-10-15 NOTE — PROGRESS NOTES
Bedside shift change report given to Kalpana Heck RN (oncoming nurse) by Caleb Vaughn (offgoing nurse). Report included the following information SBAR, Kardex and MAR.

## 2019-10-15 NOTE — PROGRESS NOTES
Problem: Pain  Goal: *Control of Pain  Outcome: Progressing Towards Goal     Problem: Falls - Risk of  Goal: *Absence of Falls  Description  Document Kaitlynn Aria Fall Risk and appropriate interventions in the flowsheet.   Outcome: Progressing Towards Goal  Note:   Fall Risk Interventions:  Mobility Interventions: Strengthening exercises (ROM-active/passive)         Medication Interventions: Teach patient to arise slowly    Elimination Interventions: Call light in reach

## 2019-10-15 NOTE — PROGRESS NOTES
Pharmacist Note - Warfarin Dosing  Consult provided for this 64 y.o.male to manage warfarin for right carotid artery thrombus      INR Goal: 2 - 3    Home regimen/ tablet size: 5 mg every day (patient unsure/not compliant)    Drugs that may increase INR: None  Drugs that may decrease INR: None  Other current anticoagulants/ drugs that may increase bleeding risk: Aspirin  Risk factors: None  Daily INR ordered: YES    Recent Labs     10/15/19  0543 10/14/19  0455 10/13/19  0426   HGB 9.3* 9.0* 9.5*   INR 1.9* 1.9* 2.0*     Date               INR                  Dose (abbreviated)  10/3  1.2  5 mg    10/4  1.2  5 mg    10/5  1.2  7.5 mg    10/6  1.2  7.5 mg    10/7  1.3  9 mg   10/8  1.3  10 mg     10/9  1.5  10 mg    10/10  1.9  2  9 mg    10/11  2.2  9 mg    10/12  2.3  7.5 mg   10/13  2  9 mg    10/14  1.9  10 mg    10/15  1.9  12.5 mg                                                                                 Assessment/ Plan: Will order warfarin 12.5 mg PO x 1 dose. Pharmacy will continue to monitor daily and adjust therapy as indicated. Please contact the pharmacist at [phone] or  for outpatient recommendations if needed.

## 2019-10-15 NOTE — PROGRESS NOTES
Problem: Falls - Risk of  Goal: *Absence of Falls  Description  Document Deanna Reardon Fall Risk and appropriate interventions in the flowsheet.   Note:   Fall Risk Interventions:  Mobility Interventions: Strengthening exercises (ROM-active/passive)         Medication Interventions: Evaluate medications/consider consulting pharmacy    Elimination Interventions: Call light in reach

## 2019-10-16 LAB
APTT PPP: 33.2 SEC (ref 22.1–32)
HCT VFR BLD AUTO: 29.6 % (ref 36.6–50.3)
HGB BLD-MCNC: 9.4 G/DL (ref 12.1–17)
INR PPP: 1.8 (ref 0.9–1.1)
INR PPP: 1.9 (ref 0.9–1.1)
PROTHROMBIN TIME: 17.9 SEC (ref 9–11.1)
PROTHROMBIN TIME: 19.1 SEC (ref 9–11.1)
THERAPEUTIC RANGE,PTTT: ABNORMAL SECS (ref 58–77)

## 2019-10-16 PROCEDURE — 74011250637 HC RX REV CODE- 250/637: Performed by: INTERNAL MEDICINE

## 2019-10-16 PROCEDURE — 85014 HEMATOCRIT: CPT

## 2019-10-16 PROCEDURE — 74011250637 HC RX REV CODE- 250/637: Performed by: HOSPITALIST

## 2019-10-16 PROCEDURE — 74011250637 HC RX REV CODE- 250/637: Performed by: PSYCHIATRY & NEUROLOGY

## 2019-10-16 PROCEDURE — 85610 PROTHROMBIN TIME: CPT

## 2019-10-16 PROCEDURE — 85730 THROMBOPLASTIN TIME PARTIAL: CPT

## 2019-10-16 PROCEDURE — 97110 THERAPEUTIC EXERCISES: CPT

## 2019-10-16 PROCEDURE — 65270000032 HC RM SEMIPRIVATE

## 2019-10-16 PROCEDURE — 74011250637 HC RX REV CODE- 250/637: Performed by: NURSE PRACTITIONER

## 2019-10-16 PROCEDURE — 36415 COLL VENOUS BLD VENIPUNCTURE: CPT

## 2019-10-16 RX ORDER — SODIUM CHLORIDE 0.9 % (FLUSH) 0.9 %
10 SYRINGE (ML) INJECTION AS NEEDED
Status: DISCONTINUED | OUTPATIENT
Start: 2019-10-16 | End: 2019-10-17 | Stop reason: HOSPADM

## 2019-10-16 RX ADMIN — Medication 10 ML: at 04:12

## 2019-10-16 RX ADMIN — ASPIRIN 81 MG CHEWABLE TABLET 81 MG: 81 TABLET CHEWABLE at 08:35

## 2019-10-16 RX ADMIN — WARFARIN SODIUM 12.5 MG: 2.5 TABLET ORAL at 17:42

## 2019-10-16 RX ADMIN — PANTOPRAZOLE SODIUM 40 MG: 40 TABLET, DELAYED RELEASE ORAL at 08:35

## 2019-10-16 RX ADMIN — ATORVASTATIN CALCIUM 80 MG: 40 TABLET, FILM COATED ORAL at 08:36

## 2019-10-16 NOTE — ROUTINE PROCESS
Bedside shift change report given to j luis sullivan rn (oncoming nurse) by Paz Story (offgoing nurse). Report included the following information SBAR and Kardex.

## 2019-10-16 NOTE — PROGRESS NOTES
Hospitalist Progress Note  Aleksandra Amanda MD  Answering service: [phone] from in house phone        Date of Service:  10/16/2019  NAME:  Roseann Batista  :  1955  MRN:  [id number]      Admission Summary: This is a 26-year-old  male with past medical history significant for bilateral lower extremity DVT and bilateral PE, on Coumadin, history of kidney stones, history of alcohol abuse and tobacco abuse, presented to [facility] with left hand weakness that started this morning quarter to 07:00 a.m. He stated that he had left hand weakness around 04:00 p.m. yesterday, and the weakness lasted for 3 hours and went away. This morning quarter to 07:00 a.m., he said he felt like someone put pressure on his left arm. He could not make a fist and decided to come to [facility]. No headache, fever, chills, sweating, left-sided chest pain, palpitation, shortness of breath, cough, abdominal pain, urinary complaint, or lower extremity weakness or swelling. No abnormal bowel movement. He took his Coumadin at 6 a.m. this morning. Interval history / Subjective:   Patient INR still sub therapeutic. Patient somewhat frustrated      Assessment & Plan:     Symptomatic right carotid thrombus with subsequent small infarcts    -Pt presented with left hand weakness and CTA showed large wall adherent fibrofatty plaque/thrombus in the distal right common carotid artery extending to the bifurcation.  -Neurointervention consulted. Pt is on heparin drip. Will continue to follow recommendations. -ECHO c/w EF 56 - 60%. No regional wall motion abnormality noted.  -Educated about stroke   -Follow up  CTA of neck and head showed improvement in the caliber of thrombus in the right common carotid, internal and external carotid arteries  -Heparin gtt stopped on 10/11  -. Discussed with Dr Eveline Walelr NOACs coud have been just fine over VKA,but patient is uninsured and could not afford NOACs.  -Pharmacy dosing and monitoring. INR still 1.9, likely discharge tomorrow if therapeutic INR. Anemia:   Hb stable. Monitor      Hypokalemia. -Resolved    Hypophosphatemia.   -On Neutra-Phos. am labs     Leukocytosis, 12.1 10/3 . Resolved  -The patient is afebrile, no signs or symptoms  of infection. H/o  bilateral large PE and bilateral lower extremity DVT, on 08/08/2018    -Pt took Coumadin on outpatient. Difficult to tell if he was compliant being an alcohol and not knowing when the last time he took it. -INR sub therapeutic. Tobacco abuse.    -The patient is advised to stop smoking.  -Nicotine patch    H/o alcohol abuse  -Thiamine,folic acid po  -No evidence of withdrawal.     Code status:full code  DVT prophylaxis:on  warfarin  Care Plan discussed with: Patient/Family  Disposition:   Barriers to discharge:Uninsured. No PCP to monitor INR therapy. History of poor compliance. He is scheduled to [facility],thursday. If INR still therapeutic by tomorrow ,likely discharge him,he would definitely need INR check in 3-5 days of discharge. He is uninsured and could not afford NOACs. Hospital Problems  Date Reviewed: 8/8/2018          Codes Class Noted POA    Carotid artery embolus, right ICD-10-CM: I65.21  ICD-9-CM: 433.10  10/2/2019 Unknown        * (Principal) Carotid artery thrombosis, right ICD-10-CM: I65.21  ICD-9-CM: 433.10  10/1/2019 Unknown        Stroke due to embolism [facility]) ICD-10-CM: I63.9  ICD-9-CM: 434.11  10/1/2019 Unknown        Left hand weakness ICD-10-CM: R29.898  ICD-9-CM: 728.87  9/30/2019 Unknown                Review of Systems:   A comprehensive review of systems was negative except for that written in the HPI. Vital Signs:    Last 24hrs VS reviewed since prior progress note.  Most recent are:  Visit Vitals  /76 (BP 1 Location: Left arm, BP Patient Position: At rest)   Pulse (!) 54   Temp 97.9 °F (36.6 °C)   Resp 17   Ht 5' 10\" (1.778 m)   Wt 82.2 kg (181 lb 3.5 oz)   SpO2 98%   BMI 26.00 kg/m²         Intake/Output Summary (Last 24 hours) at 10/16/2019 1308  Last data filed at 10/16/2019 1734  Gross per 24 hour   Intake 240 ml   Output 300 ml   Net -60 ml        Physical Examination:         Constitutional:  No acute distress, cooperative, pleasant    ENT:  Oral mucous moist, oropharynx benign. Resp:  CTA bilaterally. No wheezing/rhonchi/rales. No accessory muscle use   CV:  Regular rhythm, normal rate, no murmurs, gallops, rubs    GI:  Soft, non distended, non tender. normoactive bowel sounds, no hepatosplenomegaly     Musculoskeletal:  No edema, warm, 2+ pulses throughout    Neurologic: Poor hand  on left since admission. AAOx3. Psych:  Good insight, Not anxious nor agitated. Data Review:    Review and/or order of clinical lab test      Labs:     Recent Labs     10/16/19  0404 10/15/19  0543   HGB 9.4* 9.3*   HCT 29.6* 29.1*     No results for input(s): NA, K, CL, CO2, BUN, CREA, GLU, CA, MG, PHOS, URICA in the last 72 hours. No results for input(s): SGOT, GPT, ALT, AP, TBIL, TBILI, TP, ALB, GLOB, GGT, AML, LPSE in the last 72 hours. No lab exists for component: AMYP, HLPSE  Recent Labs     10/16/19  0908 10/16/19  0404 10/15/19  0543 10/14/19  0455   INR 1.9* 1.8* 1.9* 1.9*   PTP 19.1* 17.9* 18.7* 18.9*   APTT  --  33.2* 35.0* 36.1*      No results for input(s): FE, TIBC, PSAT, FERR in the last 72 hours. Lab Results   Component Value Date/Time    Folate 4.4 (L) 08/09/2018 03:24 AM      No results for input(s): PH, PCO2, PO2 in the last 72 hours. No results for input(s): CPK, CKNDX, TROIQ in the last 72 hours.     No lab exists for component: CPKMB  Lab Results   Component Value Date/Time    Cholesterol, total 94 10/01/2019 03:50 AM    HDL Cholesterol 37 10/01/2019 03:50 AM    LDL, calculated 44.6 10/01/2019 03:50 AM    Triglyceride 62 10/01/2019 03:50 AM    CHOL/HDL Ratio 2.5 10/01/2019 03:50 AM     Lab Results   Component Value Date/Time    Glucose (POC) 138 (H) 09/30/2019 09:55 AM     Lab Results   Component Value Date/Time    Color YELLOW/STRAW 08/27/2015 05:27 PM    Appearance CLEAR 08/27/2015 05:27 PM    Specific gravity 1.017 08/27/2015 05:27 PM    pH (UA) 7.0 08/27/2015 05:27 PM    Protein NEGATIVE  08/27/2015 05:27 PM    Glucose NEGATIVE  08/27/2015 05:27 PM    Ketone 40 (A) 08/27/2015 05:27 PM    Bilirubin NEGATIVE  08/27/2015 05:27 PM    Urobilinogen 0.2 08/27/2015 05:27 PM    Nitrites NEGATIVE  08/27/2015 05:27 PM    Leukocyte Esterase NEGATIVE  08/27/2015 05:27 PM    Epithelial cells FEW 08/27/2015 05:27 PM    Bacteria NEGATIVE  08/27/2015 05:27 PM    WBC 0-4 08/27/2015 05:27 PM    RBC 0-5 08/27/2015 05:27 PM         Medications Reviewed:     Current Facility-Administered Medications   Medication Dose Route Frequency    saline peripheral flush soln 10 mL  10 mL InterCATHeter PRN    warfarin (COUMADIN) tablet 12.5 mg  12.5 mg Oral ONCE    nicotine (NICODERM CQ) 21 mg/24 hr patch 1 Patch  1 Patch TransDERmal DAILY    benzocaine-menthol (CEPACOL) lozenge 1 Lozenge  1 Lozenge Mucous Membrane Q4H PRN    pantoprazole (PROTONIX) tablet 40 mg  40 mg Oral DAILY    Warfarin - pharmacy to dose   Other Rx Dosing/Monitoring    aspirin chewable tablet 81 mg  81 mg Oral DAILY    atorvastatin (LIPITOR) tablet 80 mg  80 mg Oral DAILY    acetaminophen (TYLENOL) tablet 650 mg  650 mg Oral Q4H PRN    Or    acetaminophen (TYLENOL) solution 650 mg  650 mg Per NG tube Q4H PRN    Or    acetaminophen (TYLENOL) suppository 650 mg  650 mg Rectal Q4H PRN     ______________________________________________________________________  EXPECTED LENGTH OF STAY: 2d 2h  ACTUAL LENGTH OF STAY:          Joni Christina MD

## 2019-10-16 NOTE — PROGRESS NOTES
Problem: Self Care Deficits Care Plan (Adult)  Goal: *Acute Goals and Plan of Care (Insert Text)  Description  FUNCTIONAL STATUS PRIOR TO ADMISSION: Patient was independent and active without use of DME.    HOME SUPPORT PRIOR TO ADMISSION: The patient lived with spouse but did not require assist. Pt indicated that spouse will not be at home upon DC. Anticipate pt will live at home, however will have friends for limited support. Occupational Therapy Goals  Re-evaluation 10/10/2019  1. Patient will participate in upper extremity therapeutic exercise/activities with supervision/set-up for 10 minutes within 7 day(s). MET 10/16/19  2. Patient will improve their Fugl Wu score by 5 points in prep for ADLs within 7 days. Initiated 10/3/2019     1. Pt will complete LB bathing with S within 7 days. 2. Pt will complete LB dressing with S within 7 days. 3. Pt will complete UB dressing with S within 7 days. 4. Pt will complete grooming/hygeine at sink with set-up/S within 7 days. 5. Pt will complete toilet transfer with S within 7 days. 6. Pt will complete toileting hygiene with S within 7 days. Outcome: Progressing Towards Goal   OCCUPATIONAL THERAPY TREATMENT  Patient: Alannah Duarte (89 y.o. male)  Date: 10/16/2019  Diagnosis: Left hand weakness [R29.898]  Carotid artery embolus, right [I65.21] Carotid artery thrombosis, right       Precautions: Fall  Chart, occupational therapy assessment, plan of care, and goals were reviewed. ASSESSMENT  Patient continues with skilled OT services and is progressing towards goals. Patient demonstrating understanding and follow through with issued exercise program from last 2 visits for left UE. He also demonstrates improvement in ability to actively extend left wrist and maintain  active finger extension for up to 3 seconds before last 3 fingers begin to flex.  Added 2 new exercises this date with focus on in-hand manipulation and adjusting pressure with thumb and fingers. Patient instructed in exercises using theraputty. Patient demonstrates understanding and follow through. Current Level of Function Impacting Discharge (ADLs): Self care with Mod I. Left UE exercises with Mod I. Other factors to consider for discharge: patient uninsured         PLAN :  Patient continues to benefit from skilled intervention to address the above impairments. Continue treatment per established plan of care. to address goals. Recommend with staff: Mod I with self care and mobility    Recommend next OT session: Galilea Zhou left UE    Recommendation for discharge: (in order for the patient to meet his/her long term goals)  Outpatient occupational therapy follow up recommended for left UE weakness. Noted patient uninsured. This discharge recommendation:  Has been made in collaboration with the attending provider and/or case management    Equipment recommendations for successful discharge (if) home: none       SUBJECTIVE:   Patient stated Look, it looks better today.  Left UE AROM    OBJECTIVE DATA SUMMARY:   Cognitive/Behavioral Status:  Neurologic State: Alert  Orientation Level: Oriented X4  Cognition: Appropriate for age attention/concentration; Follows commands  Perception: Appears intact  Perseveration: No perseveration noted  Safety/Judgement: Awareness of environment    Functional Mobility and Transfers for ADLs:  Bed Mobility:            Cognitive Retraining  Safety/Judgement: Awareness of environment    Therapeutic Exercises:   See above      Activity Tolerance:   Good  Please refer to the flowsheet for vital signs taken during this treatment.     After treatment patient left in no apparent distress:   Call bell within reach, seated EOB    COMMUNICATION/COLLABORATION:   The patients plan of care was discussed with: Registered Nurse    SHIV Perla  Time Calculation: 17 mins

## 2019-10-16 NOTE — PROGRESS NOTES
Pharmacist Note - Warfarin Dosing  Consult provided for this 64 y.o.male to manage warfarin for right carotid artery thrombus      INR Goal: 2 - 3    Home regimen/ tablet size: 5 mg every day (patient unsure/not compliant)    Drugs that may increase INR: None  Drugs that may decrease INR: None  Other current anticoagulants/ drugs that may increase bleeding risk: Aspirin  Risk factors: None  Daily INR ordered: YES    Recent Labs     10/16/19  0404 10/15/19  0543 10/14/19  0455   HGB 9.4* 9.3* 9.0*   INR 1.8* 1.9* 1.9*     Date               INR                  Dose   10/3  1.2  5 mg    10/4  1.2  5 mg    10/5  1.2  7.5 mg    10/6  1.2  7.5 mg    10/7  1.3  9 mg   10/8  1.3  10 mg     10/9  1.5  10 mg    10/10  1.9  2  9 mg    10/11  2.2  9 mg    10/12  2.3  7.5 mg   10/13  2  9 mg    10/14  1.9  10 mg    10/15  1.9  12.5 mg    10/16  1.8  12.5 mg                                                                                 Assessment/ Plan: Will order warfarin 12.5 mg PO x 1 dose. If patient is discharged I would recommend a dose of 12.5 mg daily and follow-up within a couple of days. Pharmacy will continue to monitor daily and adjust therapy as indicated. Please contact the pharmacist at x [phone] or  for outpatient recommendations if needed.

## 2019-10-16 NOTE — PROGRESS NOTES
Transition of [address] with family assistance once medically stable; expected to discharge when INR are therapeutic    Transport: spouse or family  [address] Follow up with [facility] scheduled for 10/17/2019; may need to reschedule again if patient remains hospitalized until tomorrow     [address] [facility]    [phone]

## 2019-10-17 VITALS
TEMPERATURE: 98.2 F | BODY MASS INDEX: 25.2 KG/M2 | WEIGHT: 176 LBS | HEIGHT: 70 IN | RESPIRATION RATE: 16 BRPM | DIASTOLIC BLOOD PRESSURE: 76 MMHG | OXYGEN SATURATION: 95 % | HEART RATE: 65 BPM | SYSTOLIC BLOOD PRESSURE: 123 MMHG

## 2019-10-17 LAB
APTT PPP: 35.3 SEC (ref 22.1–32)
HCT VFR BLD AUTO: 30.8 % (ref 36.6–50.3)
HGB BLD-MCNC: 9.7 G/DL (ref 12.1–17)
INR PPP: 2.1 (ref 0.9–1.1)
PROTHROMBIN TIME: 20.9 SEC (ref 9–11.1)
THERAPEUTIC RANGE,PTTT: ABNORMAL SECS (ref 58–77)

## 2019-10-17 PROCEDURE — 85610 PROTHROMBIN TIME: CPT

## 2019-10-17 PROCEDURE — 74011250637 HC RX REV CODE- 250/637: Performed by: INTERNAL MEDICINE

## 2019-10-17 PROCEDURE — 74011250637 HC RX REV CODE- 250/637: Performed by: HOSPITALIST

## 2019-10-17 PROCEDURE — 36415 COLL VENOUS BLD VENIPUNCTURE: CPT

## 2019-10-17 PROCEDURE — 74011250637 HC RX REV CODE- 250/637: Performed by: PSYCHIATRY & NEUROLOGY

## 2019-10-17 PROCEDURE — 85730 THROMBOPLASTIN TIME PARTIAL: CPT

## 2019-10-17 PROCEDURE — 74011250637 HC RX REV CODE- 250/637: Performed by: NURSE PRACTITIONER

## 2019-10-17 PROCEDURE — 85018 HEMOGLOBIN: CPT

## 2019-10-17 RX ORDER — GUAIFENESIN 100 MG/5ML
81 LIQUID (ML) ORAL DAILY
Qty: 30 TAB | Refills: 0 | Status: SHIPPED | OUTPATIENT
Start: 2019-10-18

## 2019-10-17 RX ORDER — WARFARIN 2.5 MG/1
12.5 TABLET ORAL DAILY
Qty: 30 TAB | Refills: 0 | Status: SHIPPED | OUTPATIENT
Start: 2019-10-17

## 2019-10-17 RX ORDER — ATORVASTATIN CALCIUM 80 MG/1
80 TABLET, FILM COATED ORAL DAILY
Qty: 30 TAB | Refills: 0 | Status: SHIPPED | OUTPATIENT
Start: 2019-10-18

## 2019-10-17 RX ADMIN — PANTOPRAZOLE SODIUM 40 MG: 40 TABLET, DELAYED RELEASE ORAL at 09:57

## 2019-10-17 RX ADMIN — ATORVASTATIN CALCIUM 80 MG: 40 TABLET, FILM COATED ORAL at 09:58

## 2019-10-17 RX ADMIN — WARFARIN SODIUM 12.5 MG: 2.5 TABLET ORAL at 11:19

## 2019-10-17 RX ADMIN — ASPIRIN 81 MG CHEWABLE TABLET 81 MG: 81 TABLET CHEWABLE at 09:58

## 2019-10-17 NOTE — PROGRESS NOTES
Transition of Care Plan     · Home with family assistance once medically stable; expected to discharge when INR are therapeutic   · Transport: spouse or family  · [address]   · Follow up with [facility] scheduled for 10/24/2019 @12:45 ; updated on AVS     Kaye Justice  Clinical     [phone]

## 2019-10-17 NOTE — DISCHARGE INSTRUCTIONS
Discharge Instructions       PATIENT ID: Angel Hahn  MRN: [id number]   YOB: 1955    DATE OF ADMISSION: 9/30/2019  9:36 AM    DATE OF DISCHARGE: 10/17/2019    PRIMARY CARE PROVIDER: None     ATTENDING PHYSICIAN: Domenic Arteaga MD  DISCHARGING PROVIDER: Claus Parson MD    To contact this individual call [phone] and ask the  to page. If unavailable ask to be transferred the Adult Hospitalist Department. DISCHARGE DIAGNOSES right carotid thrombus with subsequent small infarcts        CONSULTATIONS: IP CONSULT TO NEUROLOGY  IP CONSULT TO NEUROINTERVENTIONAL SURGERY  IP CONSULT TO INTERVENTIONAL RADIOLOGY  IP CONSULT TO NEUROINTERVENTIONAL SURGERY    PROCEDURES/SURGERIES: * No surgery found *        FOLLOW UP APPOINTMENTS:   Follow-up Information     Follow up With Specialties Details Why Contact Info    [facility]  On 10/17/2019 Hospital f/u new PCP appointment Thursday, 10/17/19 @ 1:30 p.m. Please provide completed application, photo ID, two months proof of income and discharge papers. [id number] [place] [address]  On 10/21/2019 Hospital f/u Monday, 10/21/19 for INR testing. Office will provide appointment time for 10/17/19 appointment. [address] [facility] [address] [facility] [place], [place], [address], DO Neurology In 1 week  [address] [address]  [phone]             ADDITIONAL CARE RECOMMENDATIONS:   Follow up with [facility] today at 1:15 and on oct 21 (Monday)    DIET: Cardiac Diet    ACTIVITY: Activity as tolerated        DISCHARGE MEDICATIONS:   See Medication Reconciliation Form    · It is important that you take the medication exactly as they are prescribed. · Keep your medication in the bottles provided by the pharmacist and keep a list of the medication names, dosages, and times to be taken in your wallet. · Do not take other medications without consulting your doctor. NOTIFY YOUR PHYSICIAN FOR ANY OF THE FOLLOWING:   Fever over 101 degrees for 24 hours. Chest pain, shortness of breath, fever, chills, nausea, vomiting, diarrhea, change in mentation, falling, weakness, bleeding. Severe pain or pain not relieved by medications. Or, any other signs or symptoms that you may have questions about. DISPOSITION:    Home With:   OT  PT  HH  RN       SNF/Inpatient Rehab/LTAC    Independent/assisted living    Hospice    Other:     CDMP Checked:   Yes ***     PROBLEM LIST Updated:  Yes ***       Information obtained by :   I understand that if any problems occur once I am at home I am to contact my physician. I understand and acknowledge receipt of the instructions indicated above.                                                                                                                                              Physician's or R.N.'s Signature                                                                  Date/Time                                                                                                                                              Patient or Representative Signature                                                          Date/Time          Signed:   Margarette Salvador MD  10/17/2019  9:40 AM

## 2019-10-17 NOTE — PROGRESS NOTES
Pharmacist Note - Warfarin Dosing  Consult provided for this 64 y.o.male to manage warfarin for right carotid artery thrombus      INR Goal: 2 - 3    Home regimen/ tablet size: 5 mg every day (patient unsure/not compliant)    Drugs that may increase INR: None  Drugs that may decrease INR: None  Other current anticoagulants/ drugs that may increase bleeding risk: Aspirin  Risk factors: None  Daily INR ordered: YES    Recent Labs     10/17/19  0626 10/16/19  0908 10/16/19  0404 10/15/19  0543   HGB 9.7*  --  9.4* 9.3*   INR 2.1* 1.9* 1.8* 1.9*     Date               INR                  Dose   10/3  1.2  5 mg    10/4  1.2  5 mg    10/5  1.2  7.5 mg    10/6  1.2  7.5 mg    10/7  1.3  9 mg   10/8  1.3  10 mg     10/9  1.5  10 mg    10/10  1.9  2  9 mg    10/11  2.2  9 mg    10/12  2.3  7.5 mg   10/13  2  9 mg    10/14  1.9  10 mg    10/15  1.9  12.5 mg    10/16  1.8  12.5 mg    10/17  2.1  12.5 mg                                                                                 Assessment/ Plan: Will order warfarin 12.5 mg PO x 1 dose. If patient is discharged I would recommend a dose of 12.5 mg daily and follow-up within a couple of days. Pharmacy will continue to monitor daily and adjust therapy as indicated. Please contact the pharmacist at x [phone] or  for outpatient recommendations if needed.

## 2019-10-17 NOTE — DISCHARGE SUMMARY
Inpatient hospitalist discharge summary                Brief Overview    PATIENT ID: Jennifer Ray    MRN: [id number]     YOB: 1955    Admitting Provider: Emory Petty MD    Discharging Provider: Jong Guillen MD   To contact this individual call [phone] and ask the  to page. If unavailable ask to be transferred the Adult Hospitalist Department. PCP at discharge: None None   None ([phone]) Patient stated that they have no PCP    Admission date: 9/30/2019  Date of Discharge: 10/17/19    Chief complaint:   Chief Complaint   Patient presents with    Hand Pain     Patient Active Problem List   Diagnosis Code    Idiopathic gout of multiple sites M10.09    Pulmonary emboli (HCC) I26.99    Tobacco abuse Z72.0    Alcohol use Z72.89    Chest pain, pleuritic R07.81    Left hand weakness R29.898    Carotid artery thrombosis, right I65.21    Stroke due to embolism (HCC) I63.9    Carotid artery embolus, right I65.21         Discharge diagnosis, hospital course/plan:  As per initial history   This is a 72-year-old  male with past medical history significant for bilateral lower extremity DVT and bilateral PE, on Coumadin, history of kidney stones, history of alcohol abuse and tobacco abuse, presented to [facility] Emergency Department with left hand weakness that started this morning quarter to 07:00 a.m. [facility] stated that he had left hand weakness around 04:00 p.m. yesterday, and the weakness lasted for 3 hours and went away.  This morning quarter to 07:00 a.m., he said he felt like someone put pressure on his left arm.  He could not make a fist and decided to come to [facility] Emergency Department.  No headache, fever, chills, sweating, left-sided chest pain, palpitation, shortness of breath, cough, abdominal pain, urinary complaint, or lower extremity weakness or swelling.  No abnormal bowel movement.  He took his Coumadin at 6 a.m. this morning.     right carotid thrombus with subsequent small infarcts    -Pt presented with left hand weakness and CTA showed large wall adherent fibrofatty plaque/thrombus in the distal right common carotid artery extending to the bifurcation.  -Neurointervention consulted. -ECHO c/w EF 56 - 60%. No regional wall motion abnormality noted.  -Educated about stroke   -Follow up  CTA of neck and head showed improvement in the caliber of thrombus in the right common carotid, internal and external carotid arteries  -Heparin gtt stopped on 10/11  -. Discussed with Dr Raul Cee NOACs coud have been just fine over VKA,but patient is uninsured and could not afford NOACs. INR 2.1 today. Advised the patient to follow up with crossover clinic. For initial assessment and INR check today at 7:30 am  Patient agreed to the plan. Written insturction were given to patient along with written address. Patient understood.      Anemia:   Hb stable.      Hypokalemia. -Resolve     Leukocytosis Resolved  -The patient is afebrile, no signs or symptoms  of infection.         H/o  bilateral large PE and bilateral lower extremity DVT, on 08/08/2018    -Pt took Coumadin on outpatient. Difficult to tell if he was compliant being an alcohol and not knowing when the last time he took it. -INR therapeutic today  Will follow up with cross over clinic      Tobacco abuse.    -The patient is advised to stop smoking.     H/o alcohol abuse  -Thiamine,folic acid po  -No evidence of withdrawal.    On the date of discharge, diagnostic face to face encounter was performed. Patient was hemodynamically stable, offering no new complaints. Denies any shortness of breath at rest, no fevers or chills, no diarrhea or constipation. Patient is agreeable for discharge. Patient understood and verbalized the understanding of the discharge plan. Patient was advised to seek medical help/ care or return to ED, if symptoms recur, worsen or new symptoms develop.       Discharge Disposition:  Home or Self Care    Discharge activity:  Activity as tolerated    Code status at discharge:  Full Code       Outpatient follow up:  Cross over clinic       Future appointments-  No future appointments. Follow-up Information     Follow up With Specialties Details Why [facility]  On 10/17/2019 Hospital f/u new PCP appointment Thursday, 10/17/19 @ 1:30 p.m. Please provide completed application, photo ID, two months proof of income and discharge papers. [id number] [facility]  On 10/21/2019 Hospital f/u Monday, 10/21/19 for INR testing. Office will provide appointment time for 10/17/19 appointment. [address] [facility] [address], DO Neurology In 1 week  [address] [address]  [phone]      None    None (852) Patient stated that they have no PCP                Consults: IP CONSULT TO NEUROLOGY  IP CONSULT TO NEUROINTERVENTIONAL SURGERY  IP CONSULT TO INTERVENTIONAL RADIOLOGY  IP CONSULT TO NEUROINTERVENTIONAL SURGERY    Procedures:  * No surgery found *    Diet:  DIET ONE TIME MESSAGE  DIET ONE TIME MESSAGE  DIET CARDIAC    Pertinent test results:  Xr Hand Lt Min 3 V    Result Date: 9/30/2019  EXAM: XR HAND LT MIN 3 V INDICATION: left hand pain. Left hand weakness since yesterday. COMPARISON: None. FINDINGS: Three views of the left hand demonstrate no fracture, dislocation or other acute osseous or articular abnormality. The soft tissues are within normal limits. IMPRESSION: No acute abnormality. Mri Brain W Wo Cont    Result Date: 9/30/2019  EXAM:  MRI BRAIN W WO CONT INDICATION:    left hand weakness COMPARISON:  CT head 9/30/2019. CONTRAST: 10 ml Dotarem. TECHNIQUE:  Multiplanar multisequence acquisition without and with contrast of the brain.  FINDINGS: Scattered small acute cortical/subcortical infarcts throughout the right frontal, parietal, and occipital lobes, predominantly in the frontoparietal lobes. No evidence of acute hemorrhage. The ventricles are normal in size and position. There is no extra-axial fluid collection or mass effect. There is no cerebellar tonsillar herniation. Expected arterial flow-voids are present. No evidence of abnormal enhancement. The paranasal sinuses, mastoid air cells, and middle ears are clear. The orbital contents are within normal limits. No significant osseous or scalp lesions are identified. IMPRESSION: 1. Scattered small acute cortical/subcortical infarcts throughout the right frontal, parietal, and occipital lobes. 23X     Ct Head Wo Cont    Result Date: 10/11/2019  EXAM: CT HEAD WO CONT INDICATION: severe headache COMPARISON: 10/7/2019. CONTRAST: None. TECHNIQUE: Unenhanced CT of the head was performed using 5 mm images. Brain and bone windows were generated. CT dose reduction was achieved through use of a standardized protocol tailored for this examination and automatic exposure control for dose modulation. FINDINGS: The ventricles and sulci are normal in size, shape and configuration and midline. There is no significant white matter disease. There is no intracranial hemorrhage, extra-axial collection, mass, mass effect or midline shift. The basilar cisterns are open. No acute infarct is identified. The bone windows demonstrate no abnormalities. The visualized portions of the paranasal sinuses and mastoid air cells are clear. Vascular calcification is noted. IMPRESSION: No acute process or change compared to the prior exam.     Ct Head Wo Cont    Result Date: 9/30/2019  EXAM:  CT HEAD WO CONT INDICATION:  left hand weakness, dropping things, patient presents with new onset left hand weakness starting yesterday. Loss of  strength. TECHNIQUE: Thin axial images were obtained through the calvarium and saved with standard and bone window algorithm. Coronal and sagittal reconstructions were generated.   CT dose reduction was achieved through use of a standardized protocol tailored for this examination and automatic exposure control for dose modulation. COMPARISON: None available. FINDINGS: The ventricular size and configuration are normal. The cerebral density is normal throughout. No evidence of intracranial hemorrhage, infarct, mass or abnormal extra-axial fluid collections. Visualized osseous structures of the calvarium and skull base including paranasal sinuses are unremarkable. IMPRESSION: Normal head CT. Cta Head    Result Date: 10/7/2019  CLINICAL HISTORY: Right-sided carotid embolus. Left hand weakness. EXAMINATION:  CT ANGIOGRAPHY HEAD AND NECK COMPARISON: October 3, 2019 TECHNIQUE:  Following the uneventful administration of iodinated contrast material, axial CT angiography of the head and neck was performed. Delayed axial images through the head were also obtained. Coronal and sagittal reconstructions were obtained. Manual postprocessing of images was performed. 3-D  Sagittal maximal intensity projection images were obtained. 3-D Coronal maximal intensity projections were obtained. CT dose reduction was achieved through use of a standardized protocol tailored for this examination and automatic exposure control for dose modulation. Adaptive statistical iterative reconstruction (ASIR) was utilized. FINDINGS: Delayed contrast-enhanced head CT: The ventricles are midline without hydrocephalus. There is no acute intra or extra-axial hemorrhage. The basal cisterns are clear. The paranasal sinuses are clear. CTA NECK: Great vessels: Normal arch anatomy with the origins patent. Right subclavian artery: Patent Left subclavian artery: Patent Right common carotid artery: Interval improvement in the right carotid artery soft plaque with extension into the proximal internal and external carotid arteries. Less than 50% luminal narrowing.  Left common carotid artery: Patent Cervical right internal carotid artery: Decreased caliber of the thrombus in the proximal right internal carotid artery. No significant stenosis by NASCET criteria. The component in the external carotid artery is also improved. Cervical left internal carotid artery: Patent with no significant stenosis by NASCET criteria. Right vertebral artery: Patent Left vertebral artery: Patent Lung apices are clear. Millimetric left thyroid lobe nodule. No cervical lymphadenopathy. CTA HEAD: Right cavernous internal carotid artery: Patent Left cavernous internal carotid artery: Patent Anterior cerebral arteries: Patent Anterior communicating artery: Patent Right middle cerebral artery: Patent Left middle cerebral artery: Patent Posterior communicating arteries: Diminutive Posterior cerebral arteries: Patent Basilar artery: Patent Distal vertebral arteries: Patent No evidence for intracranial aneurysm or hemodynamically significant stenosis. IMPRESSION: 1. Improvement in the caliber of thrombus in the right common carotid, internal and external carotid arteries. 2.  Patent intracranial vasculature. Cta Head    Result Date: 10/4/2019  *PRELIMINARY REPORT* Study performed at 1541 hours. Submitted for my interpretation 2121 hours. Compare to September 30, 2019. Minimal if any change in soft plaque/thrombus right carotid bifurcation extending both internal and external carotid arteries causing moderate luminal narrowing. No evidence of intracranial thrombosis/occlusion Preliminary report was provided by Dr. Teresa Hale, the on-call radiologist, at 2130 Final report to follow. *END PRELIMINARY REPORT* CLINICAL HISTORY: Left-sided weakness. Carotid artery embolus on the right. EXAMINATION:  CT ANGIOGRAPHY HEAD AND NECK COMPARISON: September 30, 2019 TECHNIQUE:  Following the uneventful administration of iodinated contrast material, axial CT angiography of the head and neck was performed. Delayed axial images through the head were also obtained.  Coronal and sagittal reconstructions were obtained. Manual postprocessing of images was performed. 3-D  Sagittal maximal intensity projection images were obtained. 3-D Coronal maximal intensity projections were obtained. CT dose reduction was achieved through use of a standardized protocol tailored for this examination and automatic exposure control for dose modulation. Adaptive statistical iterative reconstruction (ASIR) was utilized. FINDINGS: Delayed contrast-enhanced head CT: The ventricles are midline without hydrocephalus. There is no acute intra or extra-axial hemorrhage. The basal cisterns are clear. The paranasal sinuses are clear. CTA NECK: Great vessels: Normal arch anatomy with the origins patent. Right subclavian artery: Patent Left subclavian artery: Patent Right common carotid artery: Soft plaque/thrombus in the distal right common carotid artery with less than 50% luminal narrowing. The thrombus extends into the right external carotid artery and has further extended into the facial artery branch proximally. Left common carotid artery: Patent Cervical right internal carotid artery: There has been no significant change in the proximal right internal carotid artery luminal thrombus and mild luminal narrowing. No further progression into the right internal carotid artery. Less than 50% stenosis by NASCET criteria. Cervical left internal carotid artery: Patent with no significant stenosis by NASCET criteria. Right vertebral artery: Patent Left vertebral artery: Patent Lung apices are clear. Millimetric left thyroid lobe nodule. No cervical lymphadenopathy.  CTA HEAD: Right cavernous internal carotid artery: Patent Left cavernous internal carotid artery: Patent Anterior cerebral arteries: Patent Anterior communicating artery: Patent Right middle cerebral artery: Patent Left middle cerebral artery: Patent Posterior communicating arteries: Diminutive Posterior cerebral arteries: Patent Basilar artery: Patent Distal vertebral arteries: Patent No evidence for intracranial aneurysm or hemodynamically significant stenosis. IMPRESSION: 1. Minimal extension of the external carotid artery component of the thrombus in the right neck, now extending into a proximal aspect of the right facial artery. 2.  No interval change in the thrombus location in the right internal carotid artery. 3.  No large vessel occlusion or significant carotid stenosis. Cta Head    Result Date: 10/1/2019  *PRELIMINARY REPORT* Soft tissue atherosclerosis of the right carotid bifurcation extends into the extradural and internal carotid arteries and causes at least moderate stenosis. Patient is at risk for embolization from this soft tissue thrombus. No intracranial flow-limiting stenosis or occlusion. No pathologic intracranial enhancement. No CT evidence of acute infarct. Cervical spine degenerative disc disease and stenoses. Centrilobular emphysema. Preliminary report was provided by Dr. Evaristo Drummond, the on-call radiologist, at 2325 hours. Final report to follow. *END PRELIMINARY REPORT* EXAM:  CTA HEAD, CTA NECK INDICATION:   Left hand weakness COMPARISON:  MRI brain 9/30/2019. CONTRAST:  100 mL of Isovue-370. TECHNIQUE:  Unenhanced  images were obtained to localize the volume for acquisition. Multislice helical axial CT angiography was performed from the aortic arch to the top of the head during uneventful rapid bolus intravenous contrast administration. Coronal and sagittal reformations and 3D post processing was performed. CT dose reduction was achieved through use of a standardized protocol tailored for this examination and automatic exposure control for dose modulation. FINDINGS: CTA Head: There is no evidence of large vessel occlusion or flow-limiting stenosis of the intracranial internal carotid, anterior cerebral, and middle cerebral arteries. Calcification of the bilateral cavernous internal carotid arteries without significant stenosis. The anterior communicating artery is patent, with small right A1 segment. There is no evidence of large vessel occlusion or flow-limiting stenosis of the intracranial vertebral arteries, basilar artery, or posterior cerebral arteries. The posterior communicating arteries are not seen. There is no evidence of aneurysm or vascular malformation. The dural venous sinuses and deep cerebral venous system are patent. No evidence of abnormal enhancement on delayed phase images. CTA NECK: NASCET method was utilized for calculating stenosis. The aortic arch is unremarkable. Large wall adherent fibrofatty plaque/thrombus in the distal right common carotid artery extending to the carotid bifurcation measuring 1.3 x 0.7 x 1.3 cm (series 3 image 488 and series 604B image 204). There is moderate stenosis of the distal right common carotid artery. There is short segment extension of intraluminal thrombus into the proximal right internal carotid artery, with mild stenosis. There is also long segment extension of intraluminal thrombus throughout the proximal and mid external carotid artery, with moderate stenosis. Calcific atherosclerosis at the left carotid bifurcation without significant stenosis. There is a codominant vertebrobasilar arterial system. The vertebral arteries and imaged portion of the basilar artery are normal in course, size and contour without significant stenosis. Visualized soft tissues of the neck are unremarkable. Visualized lung apices are clear. No acute fracture or aggressive osseous lesion. Multilevel degenerative disc disease throughout the mid and lower cervical spine with flowing ventral osteophytes consistent with DISH. IMPRESSION: CTA Head: 1. No evidence of significant stenosis or aneurysm. CTA Neck: 1.  Large wall adherent fibrofatty plaque/thrombus in the distal right common carotid artery extending to the bifurcation, with short segment extension of intraluminal thrombus into the proximal right internal carotid artery. There is overall moderate stenosis of the distal right common carotid artery and mild stenosis of the proximal right internal carotid artery. 2. Long segment extension of intraluminal thrombus into the proximal and mid right external carotid artery with moderate stenosis. Cta Neck    Result Date: 10/7/2019  CLINICAL HISTORY: Right-sided carotid embolus. Left hand weakness. EXAMINATION:  CT ANGIOGRAPHY HEAD AND NECK COMPARISON: October 3, 2019 TECHNIQUE:  Following the uneventful administration of iodinated contrast material, axial CT angiography of the head and neck was performed. Delayed axial images through the head were also obtained. Coronal and sagittal reconstructions were obtained. Manual postprocessing of images was performed. 3-D  Sagittal maximal intensity projection images were obtained. 3-D Coronal maximal intensity projections were obtained. CT dose reduction was achieved through use of a standardized protocol tailored for this examination and automatic exposure control for dose modulation. Adaptive statistical iterative reconstruction (ASIR) was utilized. FINDINGS: Delayed contrast-enhanced head CT: The ventricles are midline without hydrocephalus. There is no acute intra or extra-axial hemorrhage. The basal cisterns are clear. The paranasal sinuses are clear. CTA NECK: Great vessels: Normal arch anatomy with the origins patent. Right subclavian artery: Patent Left subclavian artery: Patent Right common carotid artery: Interval improvement in the right carotid artery soft plaque with extension into the proximal internal and external carotid arteries. Less than 50% luminal narrowing. Left common carotid artery: Patent Cervical right internal carotid artery: Decreased caliber of the thrombus in the proximal right internal carotid artery. No significant stenosis by NASCET criteria. The component in the external carotid artery is also improved.  Cervical left internal carotid artery: Patent with no significant stenosis by NASCET criteria. Right vertebral artery: Patent Left vertebral artery: Patent Lung apices are clear. Millimetric left thyroid lobe nodule. No cervical lymphadenopathy. CTA HEAD: Right cavernous internal carotid artery: Patent Left cavernous internal carotid artery: Patent Anterior cerebral arteries: Patent Anterior communicating artery: Patent Right middle cerebral artery: Patent Left middle cerebral artery: Patent Posterior communicating arteries: Diminutive Posterior cerebral arteries: Patent Basilar artery: Patent Distal vertebral arteries: Patent No evidence for intracranial aneurysm or hemodynamically significant stenosis. IMPRESSION: 1. Improvement in the caliber of thrombus in the right common carotid, internal and external carotid arteries. 2.  Patent intracranial vasculature. Cta Neck    Result Date: 10/4/2019  *PRELIMINARY REPORT* Study performed at 1541 hours. Submitted for my interpretation 2121 hours. Compare to September 30, 2019. Minimal if any change in soft plaque/thrombus right carotid bifurcation extending both internal and external carotid arteries causing moderate luminal narrowing. No evidence of intracranial thrombosis/occlusion Preliminary report was provided by Dr. Juana Combs, the on-call radiologist, at 2130 Final report to follow. *END PRELIMINARY REPORT* CLINICAL HISTORY: Left-sided weakness. Carotid artery embolus on the right. EXAMINATION:  CT ANGIOGRAPHY HEAD AND NECK COMPARISON: September 30, 2019 TECHNIQUE:  Following the uneventful administration of iodinated contrast material, axial CT angiography of the head and neck was performed. Delayed axial images through the head were also obtained. Coronal and sagittal reconstructions were obtained. Manual postprocessing of images was performed. 3-D  Sagittal maximal intensity projection images were obtained. 3-D Coronal maximal intensity projections were obtained.  CT dose reduction was achieved through use of a standardized protocol tailored for this examination and automatic exposure control for dose modulation. Adaptive statistical iterative reconstruction (ASIR) was utilized. FINDINGS: Delayed contrast-enhanced head CT: The ventricles are midline without hydrocephalus. There is no acute intra or extra-axial hemorrhage. The basal cisterns are clear. The paranasal sinuses are clear. CTA NECK: Great vessels: Normal arch anatomy with the origins patent. Right subclavian artery: Patent Left subclavian artery: Patent Right common carotid artery: Soft plaque/thrombus in the distal right common carotid artery with less than 50% luminal narrowing. The thrombus extends into the right external carotid artery and has further extended into the facial artery branch proximally. Left common carotid artery: Patent Cervical right internal carotid artery: There has been no significant change in the proximal right internal carotid artery luminal thrombus and mild luminal narrowing. No further progression into the right internal carotid artery. Less than 50% stenosis by NASCET criteria. Cervical left internal carotid artery: Patent with no significant stenosis by NASCET criteria. Right vertebral artery: Patent Left vertebral artery: Patent Lung apices are clear. Millimetric left thyroid lobe nodule. No cervical lymphadenopathy. CTA HEAD: Right cavernous internal carotid artery: Patent Left cavernous internal carotid artery: Patent Anterior cerebral arteries: Patent Anterior communicating artery: Patent Right middle cerebral artery: Patent Left middle cerebral artery: Patent Posterior communicating arteries: Diminutive Posterior cerebral arteries: Patent Basilar artery: Patent Distal vertebral arteries: Patent No evidence for intracranial aneurysm or hemodynamically significant stenosis. IMPRESSION: 1.   Minimal extension of the external carotid artery component of the thrombus in the right neck, now extending into a proximal aspect of the right facial artery. 2.  No interval change in the thrombus location in the right internal carotid artery. 3.  No large vessel occlusion or significant carotid stenosis. Cta Neck    Result Date: 10/1/2019  *PRELIMINARY REPORT* Soft tissue atherosclerosis of the right carotid bifurcation extends into the extradural and internal carotid arteries and causes at least moderate stenosis. Patient is at risk for embolization from this soft tissue thrombus. No intracranial flow-limiting stenosis or occlusion. No pathologic intracranial enhancement. No CT evidence of acute infarct. Cervical spine degenerative disc disease and stenoses. Centrilobular emphysema. Preliminary report was provided by Dr. Gregorio Dyer, the on-call radiologist, at 2325 hours. Final report to follow. *END PRELIMINARY REPORT* EXAM:  CTA HEAD, CTA NECK INDICATION:   Left hand weakness COMPARISON:  MRI brain 9/30/2019. CONTRAST:  100 mL of Isovue-370. TECHNIQUE:  Unenhanced  images were obtained to localize the volume for acquisition. Multislice helical axial CT angiography was performed from the aortic arch to the top of the head during uneventful rapid bolus intravenous contrast administration. Coronal and sagittal reformations and 3D post processing was performed. CT dose reduction was achieved through use of a standardized protocol tailored for this examination and automatic exposure control for dose modulation. FINDINGS: CTA Head: There is no evidence of large vessel occlusion or flow-limiting stenosis of the intracranial internal carotid, anterior cerebral, and middle cerebral arteries. Calcification of the bilateral cavernous internal carotid arteries without significant stenosis. The anterior communicating artery is patent, with small right A1 segment. There is no evidence of large vessel occlusion or flow-limiting stenosis of the intracranial vertebral arteries, basilar artery, or posterior cerebral arteries. The posterior communicating arteries are not seen. There is no evidence of aneurysm or vascular malformation. The dural venous sinuses and deep cerebral venous system are patent. No evidence of abnormal enhancement on delayed phase images. CTA NECK: NASCET method was utilized for calculating stenosis. The aortic arch is unremarkable. Large wall adherent fibrofatty plaque/thrombus in the distal right common carotid artery extending to the carotid bifurcation measuring 1.3 x 0.7 x 1.3 cm (series 3 image 488 and series 604B image 204). There is moderate stenosis of the distal right common carotid artery. There is short segment extension of intraluminal thrombus into the proximal right internal carotid artery, with mild stenosis. There is also long segment extension of intraluminal thrombus throughout the proximal and mid external carotid artery, with moderate stenosis. Calcific atherosclerosis at the left carotid bifurcation without significant stenosis. There is a codominant vertebrobasilar arterial system. The vertebral arteries and imaged portion of the basilar artery are normal in course, size and contour without significant stenosis. Visualized soft tissues of the neck are unremarkable. Visualized lung apices are clear. No acute fracture or aggressive osseous lesion. Multilevel degenerative disc disease throughout the mid and lower cervical spine with flowing ventral osteophytes consistent with DISH. IMPRESSION: CTA Head: 1. No evidence of significant stenosis or aneurysm. CTA Neck: 1. Large wall adherent fibrofatty plaque/thrombus in the distal right common carotid artery extending to the bifurcation, with short segment extension of intraluminal thrombus into the proximal right internal carotid artery. There is overall moderate stenosis of the distal right common carotid artery and mild stenosis of the proximal right internal carotid artery.  2. Long segment extension of intraluminal thrombus into the proximal and mid right external carotid artery with moderate stenosis.        Recent Results (from the past 336 hour(s))   PTT    Collection Time: 10/03/19  1:35 PM   Result Value Ref Range    aPTT 56.8 (H) 22.1 - 32.0 sec    aPTT, therapeutic range     58.0 - 77.0 SECS   PTT    Collection Time: 10/03/19  7:25 PM   Result Value Ref Range    aPTT 58.1 (H) 22.1 - 32.0 sec    aPTT, therapeutic range     58.0 - 77.0 SECS   PROTHROMBIN TIME + INR    Collection Time: 10/04/19  1:27 AM   Result Value Ref Range    INR 1.2 (H) 0.9 - 1.1      Prothrombin time 11.7 (H) 9.0 - 11.1 sec   PTT    Collection Time: 10/04/19  1:27 AM   Result Value Ref Range    aPTT 65.3 (H) 22.1 - 32.0 sec    aPTT, therapeutic range     58.0 - 77.0 SECS   MAGNESIUM    Collection Time: 10/04/19  1:27 AM   Result Value Ref Range    Magnesium 1.9 1.6 - 2.4 mg/dL   PHOSPHORUS    Collection Time: 10/04/19  1:27 AM   Result Value Ref Range    Phosphorus 2.9 2.6 - 4.7 MG/DL   METABOLIC PANEL, BASIC    Collection Time: 10/04/19  1:27 AM   Result Value Ref Range    Sodium 140 136 - 145 mmol/L    Potassium 3.0 (L) 3.5 - 5.1 mmol/L    Chloride 99 97 - 108 mmol/L    CO2 34 (H) 21 - 32 mmol/L    Anion gap 7 5 - 15 mmol/L    Glucose 104 (H) 65 - 100 mg/dL    BUN 4 (L) 6 - 20 MG/DL    Creatinine 0.60 (L) 0.70 - 1.30 MG/DL    BUN/Creatinine ratio 7 (L) 12 - 20      GFR est AA >60 >60 ml/min/1.73m2    GFR est non-AA >60 >60 ml/min/1.73m2    Calcium 8.0 (L) 8.5 - 10.1 MG/DL   CBC W/O DIFF    Collection Time: 10/04/19  1:27 AM   Result Value Ref Range    WBC 10.9 4.1 - 11.1 K/uL    RBC 2.28 (L) 4.10 - 5.70 M/uL    HGB 7.7 (L) 12.1 - 17.0 g/dL    HCT 23.5 (L) 36.6 - 50.3 %    .1 (H) 80.0 - 99.0 FL    MCH 33.8 26.0 - 34.0 PG    MCHC 32.8 30.0 - 36.5 g/dL    RDW 18.3 (H) 11.5 - 14.5 %    PLATELET 184 293 - 884 K/uL    MPV 9.7 8.9 - 12.9 FL    NRBC 0.0 0  WBC    ABSOLUTE NRBC 0.00 0.00 - 0.01 K/uL   PTT    Collection Time: 10/05/19  1:27 AM   Result Value Ref Range aPTT 68.6 (H) 22.1 - 32.0 sec    aPTT, therapeutic range     58.0 - 77.0 SECS   MAGNESIUM    Collection Time: 10/05/19  1:27 AM   Result Value Ref Range    Magnesium 1.6 1.6 - 2.4 mg/dL   PHOSPHORUS    Collection Time: 10/05/19  1:27 AM   Result Value Ref Range    Phosphorus 3.0 2.6 - 4.7 MG/DL   METABOLIC PANEL, BASIC    Collection Time: 10/05/19  1:27 AM   Result Value Ref Range    Sodium 140 136 - 145 mmol/L    Potassium 3.6 3.5 - 5.1 mmol/L    Chloride 104 97 - 108 mmol/L    CO2 29 21 - 32 mmol/L    Anion gap 7 5 - 15 mmol/L    Glucose 89 65 - 100 mg/dL    BUN 4 (L) 6 - 20 MG/DL    Creatinine 0.62 (L) 0.70 - 1.30 MG/DL    BUN/Creatinine ratio 6 (L) 12 - 20      GFR est AA >60 >60 ml/min/1.73m2    GFR est non-AA >60 >60 ml/min/1.73m2    Calcium 8.2 (L) 8.5 - 10.1 MG/DL   CBC W/O DIFF    Collection Time: 10/05/19  1:27 AM   Result Value Ref Range    WBC 10.3 4.1 - 11.1 K/uL    RBC 2.29 (L) 4.10 - 5.70 M/uL    HGB 7.8 (L) 12.1 - 17.0 g/dL    HCT 23.9 (L) 36.6 - 50.3 %    .4 (H) 80.0 - 99.0 FL    MCH 34.1 (H) 26.0 - 34.0 PG    MCHC 32.6 30.0 - 36.5 g/dL    RDW 19.0 (H) 11.5 - 14.5 %    PLATELET 061 634 - 627 K/uL    MPV 9.8 8.9 - 12.9 FL    NRBC 0.0 0  WBC    ABSOLUTE NRBC 0.00 0.00 - 0.01 K/uL   PROTHROMBIN TIME + INR    Collection Time: 10/05/19  1:27 AM   Result Value Ref Range    INR 1.2 (H) 0.9 - 1.1      Prothrombin time 11.8 (H) 9.0 - 11.1 sec   PROTHROMBIN TIME + INR    Collection Time: 10/06/19  1:30 AM   Result Value Ref Range    INR 1.2 (H) 0.9 - 1.1      Prothrombin time 11.9 (H) 9.0 - 11.1 sec   PTT    Collection Time: 10/06/19  1:30 AM   Result Value Ref Range    aPTT 96.8 (HH) 22.1 - 32.0 sec    aPTT, therapeutic range     58.0 - 77.0 SECS   CBC WITH AUTOMATED DIFF    Collection Time: 10/06/19  1:30 AM   Result Value Ref Range    WBC 10.6 4.1 - 11.1 K/uL    RBC 2.43 (L) 4.10 - 5.70 M/uL    HGB 8.1 (L) 12.1 - 17.0 g/dL    HCT 25.5 (L) 36.6 - 50.3 %    .9 (H) 80.0 - 99.0 FL    MCH 33.3 26.0 - 34.0 PG    MCHC 31.8 30.0 - 36.5 g/dL    RDW 19.1 (H) 11.5 - 14.5 %    PLATELET 724 447 - 086 K/uL    MPV 9.5 8.9 - 12.9 FL    NRBC 0.0 0  WBC    ABSOLUTE NRBC 0.00 0.00 - 0.01 K/uL    NEUTROPHILS 62 32 - 75 %    LYMPHOCYTES 34 12 - 49 %    MONOCYTES 4 (L) 5 - 13 %    EOSINOPHILS 0 0 - 7 %    BASOPHILS 0 0 - 1 %    IMMATURE GRANULOCYTES 0 %    ABS. NEUTROPHILS 6.6 1.8 - 8.0 K/UL    ABS. LYMPHOCYTES 3.6 (H) 0.8 - 3.5 K/UL    ABS. MONOCYTES 0.4 0.0 - 1.0 K/UL    ABS. EOSINOPHILS 0.0 0.0 - 0.4 K/UL    ABS. BASOPHILS 0.0 0.0 - 0.1 K/UL    ABS. IMM.  GRANS. 0.0 K/UL    DF MANUAL      RBC COMMENTS ANISOCYTOSIS  1+        RBC COMMENTS MACROCYTOSIS  1+        RBC COMMENTS OVALOCYTES  PRESENT        RBC COMMENTS POLYCHROMASIA  PRESENT       METABOLIC PANEL, BASIC    Collection Time: 10/06/19  1:30 AM   Result Value Ref Range    Sodium 139 136 - 145 mmol/L    Potassium 3.9 3.5 - 5.1 mmol/L    Chloride 105 97 - 108 mmol/L    CO2 29 21 - 32 mmol/L    Anion gap 5 5 - 15 mmol/L    Glucose 108 (H) 65 - 100 mg/dL    BUN 5 (L) 6 - 20 MG/DL    Creatinine 0.76 0.70 - 1.30 MG/DL    BUN/Creatinine ratio 7 (L) 12 - 20      GFR est AA >60 >60 ml/min/1.73m2    GFR est non-AA >60 >60 ml/min/1.73m2    Calcium 8.7 8.5 - 10.1 MG/DL   PTT    Collection Time: 10/06/19 10:07 AM   Result Value Ref Range    aPTT 71.0 (H) 22.1 - 32.0 sec    aPTT, therapeutic range     58.0 - 77.0 SECS   PTT    Collection Time: 10/06/19  4:58 PM   Result Value Ref Range    aPTT 51.0 (H) 22.1 - 32.0 sec    aPTT, therapeutic range     58.0 - 77.0 SECS   PROTHROMBIN TIME + INR    Collection Time: 10/07/19  1:00 AM   Result Value Ref Range    INR 1.3 (H) 0.9 - 1.1      Prothrombin time 12.8 (H) 9.0 - 11.1 sec   PTT    Collection Time: 10/07/19  1:00 AM   Result Value Ref Range    aPTT 77.2 (H) 22.1 - 32.0 sec    aPTT, therapeutic range     58.0 - 77.0 SECS   PTT    Collection Time: 10/07/19  7:10 AM   Result Value Ref Range    aPTT 71.5 (H) 22.1 - 32.0 sec    aPTT, therapeutic range     58.0 - 77.0 SECS   PROTHROMBIN TIME + INR    Collection Time: 10/08/19  2:00 AM   Result Value Ref Range    INR 1.3 (H) 0.9 - 1.1      Prothrombin time 12.9 (H) 9.0 - 11.1 sec   PTT    Collection Time: 10/08/19  2:00 AM   Result Value Ref Range    aPTT 32.2 (H) 22.1 - 32.0 sec    aPTT, therapeutic range     58.0 - 77.0 SECS   CBC WITH AUTOMATED DIFF    Collection Time: 10/08/19  2:00 AM   Result Value Ref Range    WBC 8.3 4.1 - 11.1 K/uL    RBC 2.50 (L) 4.10 - 5.70 M/uL    HGB 8.4 (L) 12.1 - 17.0 g/dL    HCT 27.3 (L) 36.6 - 50.3 %    .2 (H) 80.0 - 99.0 FL    MCH 33.6 26.0 - 34.0 PG    MCHC 30.8 30.0 - 36.5 g/dL    RDW 19.8 (H) 11.5 - 14.5 %    PLATELET 332 613 - 834 K/uL    MPV 9.4 8.9 - 12.9 FL    NRBC 0.0 0  WBC    ABSOLUTE NRBC 0.00 0.00 - 0.01 K/uL    NEUTROPHILS 66 32 - 75 %    LYMPHOCYTES 26 12 - 49 %    MONOCYTES 6 5 - 13 %    EOSINOPHILS 1 0 - 7 %    BASOPHILS 0 0 - 1 %    IMMATURE GRANULOCYTES 1 (H) 0.0 - 0.5 %    ABS. NEUTROPHILS 5.4 1.8 - 8.0 K/UL    ABS. LYMPHOCYTES 2.2 0.8 - 3.5 K/UL    ABS. MONOCYTES 0.5 0.0 - 1.0 K/UL    ABS. EOSINOPHILS 0.1 0.0 - 0.4 K/UL    ABS. BASOPHILS 0.0 0.0 - 0.1 K/UL    ABS. IMM.  GRANS. 0.1 (H) 0.00 - 0.04 K/UL    DF SMEAR SCANNED      RBC COMMENTS MACROCYTOSIS  1+        RBC COMMENTS ANISOCYTOSIS  1+        RBC COMMENTS OVALOCYTES  PRESENT        RBC COMMENTS MISHA CELLS  PRESENT       MAGNESIUM    Collection Time: 10/08/19  6:08 AM   Result Value Ref Range    Magnesium 2.0 1.6 - 2.4 mg/dL   PHOSPHORUS    Collection Time: 10/08/19  6:08 AM   Result Value Ref Range    Phosphorus 3.8 2.6 - 4.7 MG/DL   METABOLIC PANEL, COMPREHENSIVE    Collection Time: 10/08/19  6:08 AM   Result Value Ref Range    Sodium 138 136 - 145 mmol/L    Potassium 4.3 3.5 - 5.1 mmol/L    Chloride 106 97 - 108 mmol/L    CO2 24 21 - 32 mmol/L    Anion gap 8 5 - 15 mmol/L    Glucose 86 65 - 100 mg/dL    BUN 6 6 - 20 MG/DL    Creatinine 0.97 0.70 - 1.30 MG/DL BUN/Creatinine ratio 6 (L) 12 - 20      GFR est AA >60 >60 ml/min/1.73m2    GFR est non-AA >60 >60 ml/min/1.73m2    Calcium 9.4 8.5 - 10.1 MG/DL    Bilirubin, total 0.4 0.2 - 1.0 MG/DL    ALT (SGPT) 22 12 - 78 U/L    AST (SGOT) 55 (H) 15 - 37 U/L    Alk. phosphatase 100 45 - 117 U/L    Protein, total 7.3 6.4 - 8.2 g/dL    Albumin 3.3 (L) 3.5 - 5.0 g/dL    Globulin 4.0 2.0 - 4.0 g/dL    A-G Ratio 0.8 (L) 1.1 - 2.2     PTT    Collection Time: 10/08/19  9:49 AM   Result Value Ref Range    aPTT 115.3 (HH) 22.1 - 32.0 sec    aPTT, therapeutic range     58.0 - 77.0 SECS   PTT    Collection Time: 10/08/19 12:26 PM   Result Value Ref Range    aPTT 39.7 (H) 22.1 - 32.0 sec    aPTT, therapeutic range     58.0 - 77.0 SECS   PTT    Collection Time: 10/08/19  8:03 PM   Result Value Ref Range    aPTT 84.8 (H) 22.1 - 32.0 sec    aPTT, therapeutic range     58.0 - 77.0 SECS   PTT    Collection Time: 10/09/19  1:58 AM   Result Value Ref Range    aPTT 82.3 (H) 22.1 - 32.0 sec    aPTT, therapeutic range     58.0 - 77.0 SECS   HGB & HCT    Collection Time: 10/09/19  1:58 AM   Result Value Ref Range    HGB 8.9 (L) 12.1 - 17.0 g/dL    HCT 28.1 (L) 36.6 - 50.3 %   SAMPLES BEING HELD    Collection Time: 10/09/19  1:58 AM   Result Value Ref Range    SAMPLES BEING HELD 1PST     COMMENT        Add-on orders for these samples will be processed based on acceptable specimen integrity and analyte stability, which may vary by analyte.    PROTHROMBIN TIME + INR    Collection Time: 10/09/19  1:58 AM   Result Value Ref Range    INR 1.5 (H) 0.9 - 1.1      Prothrombin time 14.9 (H) 9.0 - 11.1 sec   PTT    Collection Time: 10/09/19  9:33 AM   Result Value Ref Range    aPTT 82.3 (H) 22.1 - 32.0 sec    aPTT, therapeutic range     58.0 - 77.0 SECS   PTT    Collection Time: 10/09/19  3:10 PM   Result Value Ref Range    aPTT 84.3 (H) 22.1 - 32.0 sec    aPTT, therapeutic range     58.0 - 77.0 SECS   PTT    Collection Time: 10/10/19 12:03 AM   Result Value Ref Range    aPTT 74.9 (H) 22.1 - 32.0 sec    aPTT, therapeutic range     58.0 - 77.0 SECS   HGB & HCT    Collection Time: 10/10/19 12:03 AM   Result Value Ref Range    HGB 8.7 (L) 12.1 - 17.0 g/dL    HCT 27.7 (L) 36.6 - 50.3 %   PROTHROMBIN TIME + INR    Collection Time: 10/10/19 12:03 AM   Result Value Ref Range    INR 1.9 (H) 0.9 - 1.1      Prothrombin time 18.2 (H) 9.0 - 11.1 sec   PROTHROMBIN TIME + INR    Collection Time: 10/10/19  5:30 AM   Result Value Ref Range    INR 2.0 (H) 0.9 - 1.1      Prothrombin time 19.4 (H) 9.0 - 11.1 sec   PTT    Collection Time: 10/10/19  5:30 AM   Result Value Ref Range    aPTT 72.5 (H) 22.1 - 32.0 sec    aPTT, therapeutic range     58.0 - 77.0 SECS   SAMPLES BEING HELD    Collection Time: 10/10/19  5:30 AM   Result Value Ref Range    SAMPLES BEING HELD 1LAV 1PST     COMMENT        Add-on orders for these samples will be processed based on acceptable specimen integrity and analyte stability, which may vary by analyte.    PROTHROMBIN TIME + INR    Collection Time: 10/11/19  5:04 AM   Result Value Ref Range    INR 2.2 (H) 0.9 - 1.1      Prothrombin time 21.8 (H) 9.0 - 11.1 sec   PTT    Collection Time: 10/11/19  5:04 AM   Result Value Ref Range    aPTT 66.4 (H) 22.1 - 32.0 sec    aPTT, therapeutic range     58.0 - 77.0 SECS   HGB & HCT    Collection Time: 10/11/19  5:04 AM   Result Value Ref Range    HGB 8.6 (L) 12.1 - 17.0 g/dL    HCT 27.7 (L) 36.6 - 50.3 %   PROTHROMBIN TIME + INR    Collection Time: 10/12/19  5:05 AM   Result Value Ref Range    INR 2.3 (H) 0.9 - 1.1      Prothrombin time 22.4 (H) 9.0 - 11.1 sec   PTT    Collection Time: 10/12/19  5:05 AM   Result Value Ref Range    aPTT 41.0 (H) 22.1 - 32.0 sec    aPTT, therapeutic range     58.0 - 77.0 SECS   HGB & HCT    Collection Time: 10/12/19  5:05 AM   Result Value Ref Range    HGB 8.9 (L) 12.1 - 17.0 g/dL    HCT 28.5 (L) 36.6 - 50.3 %   PROTHROMBIN TIME + INR    Collection Time: 10/13/19  4:26 AM   Result Value Ref Range INR 2.0 (H) 0.9 - 1.1      Prothrombin time 19.9 (H) 9.0 - 11.1 sec   PTT    Collection Time: 10/13/19  4:26 AM   Result Value Ref Range    aPTT 40.0 (H) 22.1 - 32.0 sec    aPTT, therapeutic range     58.0 - 77.0 SECS   HGB & HCT    Collection Time: 10/13/19  4:26 AM   Result Value Ref Range    HGB 9.5 (L) 12.1 - 17.0 g/dL    HCT 29.8 (L) 36.6 - 50.3 %   PROTHROMBIN TIME + INR    Collection Time: 10/14/19  4:55 AM   Result Value Ref Range    INR 1.9 (H) 0.9 - 1.1      Prothrombin time 18.9 (H) 9.0 - 11.1 sec   PTT    Collection Time: 10/14/19  4:55 AM   Result Value Ref Range    aPTT 36.1 (H) 22.1 - 32.0 sec    aPTT, therapeutic range     58.0 - 77.0 SECS   HGB & HCT    Collection Time: 10/14/19  4:55 AM   Result Value Ref Range    HGB 9.0 (L) 12.1 - 17.0 g/dL    HCT 28.4 (L) 36.6 - 50.3 %   PROTHROMBIN TIME + INR    Collection Time: 10/15/19  5:43 AM   Result Value Ref Range    INR 1.9 (H) 0.9 - 1.1      Prothrombin time 18.7 (H) 9.0 - 11.1 sec   PTT    Collection Time: 10/15/19  5:43 AM   Result Value Ref Range    aPTT 35.0 (H) 22.1 - 32.0 sec    aPTT, therapeutic range     58.0 - 77.0 SECS   HGB & HCT    Collection Time: 10/15/19  5:43 AM   Result Value Ref Range    HGB 9.3 (L) 12.1 - 17.0 g/dL    HCT 29.1 (L) 36.6 - 50.3 %   PROTHROMBIN TIME + INR    Collection Time: 10/16/19  4:04 AM   Result Value Ref Range    INR 1.8 (H) 0.9 - 1.1      Prothrombin time 17.9 (H) 9.0 - 11.1 sec   PTT    Collection Time: 10/16/19  4:04 AM   Result Value Ref Range    aPTT 33.2 (H) 22.1 - 32.0 sec    aPTT, therapeutic range     58.0 - 77.0 SECS   HGB & HCT    Collection Time: 10/16/19  4:04 AM   Result Value Ref Range    HGB 9.4 (L) 12.1 - 17.0 g/dL    HCT 29.6 (L) 36.6 - 50.3 %   PROTHROMBIN TIME + INR    Collection Time: 10/16/19  9:08 AM   Result Value Ref Range    INR 1.9 (H) 0.9 - 1.1      Prothrombin time 19.1 (H) 9.0 - 11.1 sec   HGB & HCT    Collection Time: 10/17/19  6:26 AM   Result Value Ref Range    HGB 9.7 (L) 12.1 - 17.0 g/dL    HCT 30.8 (L) 36.6 - 50.3 %   PROTHROMBIN TIME + INR    Collection Time: 10/17/19  6:26 AM   Result Value Ref Range    INR 2.1 (H) 0.9 - 1.1      Prothrombin time 20.9 (H) 9.0 - 11.1 sec   PTT    Collection Time: 10/17/19  6:26 AM   Result Value Ref Range    aPTT 35.3 (H) 22.1 - 32.0 sec    aPTT, therapeutic range     58.0 - 77.0 SECS           Physical Exam on Discharge:    Discharge condition: good    Vital signs:   Patient Vitals for the past 12 hrs:   Temp Pulse Resp BP SpO2   10/17/19 0815 98.2 °F (36.8 °C) 65 16 123/76 95 %   10/17/19 0345 97.8 °F (36.6 °C) (!) 58 16 114/72 94 %       Visit Vitals  /76 (BP 1 Location: Left arm, BP Patient Position: Sitting)   Pulse 65   Temp 98.2 °F (36.8 °C)   Resp 16   Ht 5' 10\" (1.778 m)   Wt 79.8 kg (176 lb)   SpO2 95%   BMI 25.25 kg/m²     General:  Alert, cooperative, no distress, appears stated age. Head:  Normocephalic, without obvious abnormality, atraumatic. Lungs:   Clear to auscultation bilaterally. Chest wall:  No tenderness or deformity. Heart:  Regular rate and rhythm, S1, S2 normal, no murmur, click, rub or gallop. Abdomen:   Soft, non-tender. Bowel sounds normal. No masses,  No organomegaly. Extremities: Extremities normal, atraumatic, no cyanosis or edema. Pulses: 2+ and symmetric all extremities. Skin: Skin color, texture, turgor normal. No rashes or lesions       Current Discharge Medication List      START taking these medications    Details   aspirin 81 mg chewable tablet Take 1 Tab by mouth daily. Qty: 30 Tab, Refills: 0      atorvastatin (LIPITOR) 80 mg tablet Take 1 Tab by mouth daily. Qty: 30 Tab, Refills: 0         CONTINUE these medications which have CHANGED    Details   warfarin (COUMADIN) 2.5 mg tablet Take 5 Tabs by mouth daily.   Qty: 30 Tab, Refills: 0               Total time spent on discharge planning, counseling and co-ordination of care:   35 minutes    Padmaja Su MD  10/17/19  9:29 AM

## 2019-10-17 NOTE — PROGRESS NOTES
Hospital follow-up new PCP transitional care appointment has been re-scheduled with [address] for Thursday, 10/17/19 at 1:30 p.m followed with an appointment on Monday, 10/21/19 for INR testing. Office will provide appointment time on 10/17/19. Pending patient discharge.   Cleotha Gosselin, Care Management Specialist.